# Patient Record
Sex: FEMALE | Race: WHITE | NOT HISPANIC OR LATINO | Employment: OTHER | URBAN - METROPOLITAN AREA
[De-identification: names, ages, dates, MRNs, and addresses within clinical notes are randomized per-mention and may not be internally consistent; named-entity substitution may affect disease eponyms.]

---

## 2018-08-28 ENCOUNTER — EVALUATION (OUTPATIENT)
Dept: PHYSICAL THERAPY | Facility: CLINIC | Age: 63
End: 2018-08-28
Payer: MEDICARE

## 2018-08-28 VITALS — SYSTOLIC BLOOD PRESSURE: 106 MMHG | DIASTOLIC BLOOD PRESSURE: 72 MMHG

## 2018-08-28 DIAGNOSIS — M23.300 MENISCUS, LATERAL, DERANGEMENT, RIGHT: ICD-10-CM

## 2018-08-28 DIAGNOSIS — M25.561 RIGHT KNEE PAIN, UNSPECIFIED CHRONICITY: Primary | ICD-10-CM

## 2018-08-28 PROCEDURE — 97140 MANUAL THERAPY 1/> REGIONS: CPT | Performed by: PHYSICAL THERAPIST

## 2018-08-28 PROCEDURE — 97161 PT EVAL LOW COMPLEX 20 MIN: CPT | Performed by: PHYSICAL THERAPIST

## 2018-08-28 PROCEDURE — G8979 MOBILITY GOAL STATUS: HCPCS | Performed by: PHYSICAL THERAPIST

## 2018-08-28 PROCEDURE — 97110 THERAPEUTIC EXERCISES: CPT | Performed by: PHYSICAL THERAPIST

## 2018-08-28 PROCEDURE — G8978 MOBILITY CURRENT STATUS: HCPCS | Performed by: PHYSICAL THERAPIST

## 2018-08-28 PROCEDURE — 97014 ELECTRIC STIMULATION THERAPY: CPT | Performed by: PHYSICAL THERAPIST

## 2018-08-28 RX ORDER — HYDROCODONE BITARTRATE AND ACETAMINOPHEN 5; 325 MG/1; MG/1
1 TABLET ORAL EVERY 6 HOURS PRN
COMMUNITY
End: 2019-09-13

## 2018-08-28 RX ORDER — MELOXICAM 15 MG/1
15 TABLET ORAL DAILY
COMMUNITY
End: 2019-09-13
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
## 2018-08-29 ENCOUNTER — TRANSCRIBE ORDERS (OUTPATIENT)
Dept: PHYSICAL THERAPY | Facility: CLINIC | Age: 63
End: 2018-08-29

## 2018-08-29 DIAGNOSIS — M25.561 RIGHT KNEE PAIN, UNSPECIFIED CHRONICITY: Primary | ICD-10-CM

## 2018-09-05 ENCOUNTER — OFFICE VISIT (OUTPATIENT)
Dept: PHYSICAL THERAPY | Facility: CLINIC | Age: 63
End: 2018-09-05
Payer: MEDICARE

## 2018-09-05 DIAGNOSIS — M25.561 RIGHT KNEE PAIN, UNSPECIFIED CHRONICITY: Primary | ICD-10-CM

## 2018-09-05 DIAGNOSIS — M23.300 MENISCUS, LATERAL, DERANGEMENT, RIGHT: ICD-10-CM

## 2018-09-05 PROCEDURE — 97014 ELECTRIC STIMULATION THERAPY: CPT | Performed by: PHYSICAL THERAPIST

## 2018-09-05 PROCEDURE — 97110 THERAPEUTIC EXERCISES: CPT | Performed by: PHYSICAL THERAPIST

## 2018-09-05 NOTE — PROGRESS NOTES
Daily Note     Today's date: 2018  Patient name: Luis Miguel Fernandez  : 1955  MRN: [id number]  Referring provider: Hans Newell MD  Dx:   Encounter Diagnosis     ICD-10-CM    1  Right knee pain, unspecified chronicity M25 561    2  Meniscus, lateral, derangement, right M23 300                   Subjective: Patient complains of right knee pain at 5/10 as well as sciatica pain to right thigh      Objective: See treatment diary below      Precautions:     Daily Treatment Diary     Manual             LE STRETCHES 10 10           PAT MOBS             LEG PULL                                           Exercise Diary             SLR 20x 20x           SAQ 1#/30 1/30           LAQ 1#/30 1/30           HEEL SLIDES 30x 30x           SLANT 4x 4x           BALL AD SQ             SITTING HIP FL             STANDING MARCH             BIKE             NUSTEP  4 min           CYBEX               ABD 20/30 20/30           ADD 25/30 25/30           LEG PRESS              CALF PRESS             LEG EXT             LEG CURL             TOTAL GYM             TREADMILL             ELLIPTICAL                 Modalities             E/STIM 10 10           HEAT/ICE 10 10                            Assessment: Tolerated treatment well  Patient exhibited good technique with therapeutic exercises and would benefit from continued PT, crepitus noted to right knee toay      Plan: Progress treatment as tolerated    Progress with aquatic therapy 1x/week as per MD

## 2018-09-07 ENCOUNTER — APPOINTMENT (OUTPATIENT)
Dept: PHYSICAL THERAPY | Facility: CLINIC | Age: 63
End: 2018-09-07
Payer: MEDICARE

## 2018-09-07 ENCOUNTER — OFFICE VISIT (OUTPATIENT)
Dept: PHYSICAL THERAPY | Facility: CLINIC | Age: 63
End: 2018-09-07
Payer: MEDICARE

## 2018-09-07 DIAGNOSIS — M23.300 MENISCUS, LATERAL, DERANGEMENT, RIGHT: ICD-10-CM

## 2018-09-07 DIAGNOSIS — M25.561 RIGHT KNEE PAIN, UNSPECIFIED CHRONICITY: Primary | ICD-10-CM

## 2018-09-07 PROCEDURE — 97113 AQUATIC THERAPY/EXERCISES: CPT

## 2018-09-07 NOTE — PROGRESS NOTES
Daily Note     Today's date: 2018  Patient name: Rhonda Arenas  : 1955  MRN: [id number]  Referring provider: Thi Lopez MD  Dx:   Encounter Diagnosis     ICD-10-CM    1  Right knee pain, unspecified chronicity M25 561    2  Meniscus, lateral, derangement, right M23 300        Start Time: 1600  Stop Time: 1645  Total time in clinic (min): 45 minutes    Subjective: pt complains of R knee pain -8/10, mostly post knee  Objective: See treatment diary below  /78       Precautions: R knee scop 18    Daily Treatment Diary       Exercise Diary              Water walking 10            Postural training 2            Gait training             Home exercise pgm/patient education 3 pt ed            Wall: t/h raises 1            Hip abd/add 2             1            squats             Knee flex/ext             Step-ups (fwd/bkwd/ss)             SLS (eyes open/closed)             SLS w UE mvmt  AROM/ball toss             Weight shifting             UE Noodle work x 4              UE AROM             Resistive UE work (paddles, bells, TB)             Core work on noodle (sitting/stdg)             Sit on noodle with movement             Seated on pool bench w proper posture             Ankle df/pf 1             1            Hip Ab/add 1            Knee flex/ext 1            Deep water mvmt 3 bike            Deep water tx/stretching 10            Specific self - stretches wall/steps 4                Modalities              whirlpool 5                Assessment: Tolerated treatment fair  Pt complains of R post knee pain with walking in the pool  Low level program to begin aquatic session  Instructed pt on posture and breathing during ex's  Worked in a pain free ROM with R knee today  Increase ex's and reps as tolerated  Pt Patient would benefit from continued PT      Plan: Progress treatment as tolerated  Cont PT as per plan of care

## 2018-09-11 ENCOUNTER — OFFICE VISIT (OUTPATIENT)
Dept: PHYSICAL THERAPY | Facility: CLINIC | Age: 63
End: 2018-09-11
Payer: MEDICARE

## 2018-09-11 DIAGNOSIS — M25.561 RIGHT KNEE PAIN, UNSPECIFIED CHRONICITY: Primary | ICD-10-CM

## 2018-09-11 DIAGNOSIS — M23.300 MENISCUS, LATERAL, DERANGEMENT, RIGHT: ICD-10-CM

## 2018-09-11 PROCEDURE — 97110 THERAPEUTIC EXERCISES: CPT | Performed by: PHYSICAL THERAPIST

## 2018-09-11 PROCEDURE — 97014 ELECTRIC STIMULATION THERAPY: CPT | Performed by: PHYSICAL THERAPIST

## 2018-09-11 NOTE — PROGRESS NOTES
Daily Note     Today's date: 2018  Patient name: Aidee Montreroso  : 1955  MRN: [id number]  Referring provider: Leslee Stallings MD  Dx:   Encounter Diagnosis     ICD-10-CM    1  Right knee pain, unspecified chronicity M25 561    2  Meniscus, lateral, derangement, right M23 300                   Subjective: Patient complains of right posterior and anterior knee pain 7/10 on steps      Objective: See treatment diary below        Precautions: R knee scop 18, HTN, OA    Daily Treatment Diary         Daily Treatment Diary     Manual            LE STRETCHES 10 10 10          PAT MOBS             LEG PULL                                           Exercise Diary            SLR 20x 20x 30          SAQ 1#/30           LAQ 1#/30           HEEL SLIDES 30x 30x 30x          SLANT 4x 4x 4x          BALL AD SQ             SITTING HIP FL             STANDING MARCH             BIKE             NUSTEP  4 min 5          CYBEX               ABD 20/30 20/30 30/30          ADD 25/30 25/30 30/30          LEG PRESS              CALF PRESS             LEG EXT             LEG CURL             TOTAL GYM             TREADMILL             ELLIPTICAL                 Modalities            E/STIM 10 10 10          HEAT/ICE 10 10 10                           Assessment: Tolerated treatment well  Patient exhibited good technique with therapeutic exercises and would benefit from continued PT      Plan: Progress treatment as tolerated

## 2018-09-12 ENCOUNTER — APPOINTMENT (OUTPATIENT)
Dept: PHYSICAL THERAPY | Facility: CLINIC | Age: 63
End: 2018-09-12
Payer: MEDICARE

## 2018-09-14 ENCOUNTER — APPOINTMENT (OUTPATIENT)
Dept: PHYSICAL THERAPY | Facility: CLINIC | Age: 63
End: 2018-09-14
Payer: MEDICARE

## 2018-09-14 ENCOUNTER — OFFICE VISIT (OUTPATIENT)
Dept: PHYSICAL THERAPY | Facility: CLINIC | Age: 63
End: 2018-09-14
Payer: MEDICARE

## 2018-09-14 DIAGNOSIS — M25.561 RIGHT KNEE PAIN, UNSPECIFIED CHRONICITY: Primary | ICD-10-CM

## 2018-09-14 DIAGNOSIS — M23.300 MENISCUS, LATERAL, DERANGEMENT, RIGHT: ICD-10-CM

## 2018-09-14 PROCEDURE — 97113 AQUATIC THERAPY/EXERCISES: CPT

## 2018-09-14 NOTE — PROGRESS NOTES
Daily Note     Today's date: 2018  Patient name: Elvira Alston  : 1955  MRN: [id number]  Referring provider: Bunny Ortiz MD  Dx:   Encounter Diagnosis     ICD-10-CM    1  Right knee pain, unspecified chronicity M25 561    2  Meniscus, lateral, derangement, right M23 300        Start Time: 1000  Stop Time: 1050  Total time in clinic (min): 50 minutes    Subjective: pt complains of R knee pain 8/10 with going up stairs or getting out of a chair  Just walking the knee feels ok  She notes she was sore after initial pool visit for 2 days all over  Pt did get an injection in her LB on Monday and now she notes back feels better but continues with R LE radicular symptoms  Objective: See treatment diary below  /78       Precautions: R knee scop 18    Daily Treatment Diary       Exercise Diary             Water walking 10 10           Postural training 2 2           Gait training             Home exercise pgm/patient education 3 pt ed 3           Wall: t/h raises 1 1           Hip abd/add 2 1            1 1           squats             Knee flex/ext  1           Step-ups (fwd/bkwd/ss)             SLS (eyes open/closed)             SLS w UE mvmt  AROM/ball toss             Weight shifting             UE Noodle work x 4              UE AROM             Resistive UE work (paddles, bells, TB)             Core work on noodle (sitting/stdg)             Sit on noodle with movement             Seated on pool bench w proper posture             Ankle df/pf 1 1            1           Hip Ab/add 1 1           Knee flex/ext 1 1           Deep water mvmt 3 bike NT           Deep water tx/stretching 10 10           Specific self - stretches wall/steps 4 4               Modalities             whirlpool 5 10               Assessment: Tolerated treatment fair  Kept program light due to increased soreness after last visit  increased hang time in deep water today   All ex's done in a pain free ROM for R knee  Focused on light and steady movements with good posture and breathing  Pt Patient would benefit from continued PT      Plan: Progress treatment as tolerated  Cont PT as per plan of care

## 2018-09-18 ENCOUNTER — APPOINTMENT (OUTPATIENT)
Dept: PHYSICAL THERAPY | Facility: CLINIC | Age: 63
End: 2018-09-18
Payer: MEDICARE

## 2018-09-19 ENCOUNTER — APPOINTMENT (OUTPATIENT)
Dept: PHYSICAL THERAPY | Facility: CLINIC | Age: 63
End: 2018-09-19
Payer: MEDICARE

## 2018-09-21 ENCOUNTER — OFFICE VISIT (OUTPATIENT)
Dept: PHYSICAL THERAPY | Facility: CLINIC | Age: 63
End: 2018-09-21
Payer: MEDICARE

## 2018-09-21 DIAGNOSIS — M23.300 MENISCUS, LATERAL, DERANGEMENT, RIGHT: ICD-10-CM

## 2018-09-21 DIAGNOSIS — M25.561 RIGHT KNEE PAIN, UNSPECIFIED CHRONICITY: Primary | ICD-10-CM

## 2018-09-21 PROCEDURE — G8979 MOBILITY GOAL STATUS: HCPCS

## 2018-09-21 PROCEDURE — 97113 AQUATIC THERAPY/EXERCISES: CPT

## 2018-09-21 PROCEDURE — G8978 MOBILITY CURRENT STATUS: HCPCS

## 2018-09-21 NOTE — PROGRESS NOTES
Daily Note     Today's date: 2018  Patient name: Ashleigh Balderrama  : 1955  MRN: [id number]  Referring provider: Diana Christiansen MD  Dx:   Encounter Diagnosis     ICD-10-CM    1  Right knee pain, unspecified chronicity M25 561    2  Meniscus, lateral, derangement, right M23 300        Start Time: 1015  Stop Time: 1105  Total time in clinic (min): 50 minutes    Subjective: pt complains of R knee pain 8/10 with post knee pain and radicular symptoms down R LE  She states she did better after last pool visit  Objective: See treatment diary below        Precautions: R knee scop 18    Daily Treatment Diary       Exercise Diary            Water walking 10 10 10          Postural training 2 2 2          Gait training             Home exercise pgm/patient education 3 pt ed 3           Wall: t/h raises 1 1 1          Hip abd/add 2 1 2          Marching 1 1 1          squats             Knee flex/ext  1 1          Step-ups (fwd/bkwd/ss)             SLS (eyes open/closed)             SLS w UE mvmt  AROM/ball toss             Weight shifting             UE Noodle work x 4              UE AROM             Resistive UE work (paddles, bells, TB)             Core work on noodle (sitting/stdg)             Sit on noodle with movement             Seated on pool bench w proper posture             Ankle df/pf 1 1 1          marching 1 1 1          Hip Ab/add 1 1 1          Knee flex/ext 1 1 1          Deep water mvmt 3 bike NT 2          Deep water tx/stretching 10 10 10          Specific self - stretches wall/steps 4 4 4              Modalities            whirlpool 5 10 10              Assessment: Tolerated treatment fair  Added bike back in today for 2 minutes to get some movement in B LE's  Pt moving better while in the pool  No pain in R knee while performing ex's today    Pt goes into a prone swim when changing ex's with noodle support under her arms , only lasting a few seconds pt says it feels good to move  Patient would benefit from continued PT      Plan: Progress treatment as tolerated  Cont PT as per plan of care

## 2018-09-25 ENCOUNTER — OFFICE VISIT (OUTPATIENT)
Dept: PHYSICAL THERAPY | Facility: CLINIC | Age: 63
End: 2018-09-25
Payer: MEDICARE

## 2018-09-25 DIAGNOSIS — M25.561 RIGHT KNEE PAIN, UNSPECIFIED CHRONICITY: Primary | ICD-10-CM

## 2018-09-25 PROCEDURE — 97110 THERAPEUTIC EXERCISES: CPT | Performed by: PHYSICAL THERAPIST

## 2018-09-25 PROCEDURE — 97014 ELECTRIC STIMULATION THERAPY: CPT | Performed by: PHYSICAL THERAPIST

## 2018-09-25 PROCEDURE — 97140 MANUAL THERAPY 1/> REGIONS: CPT | Performed by: PHYSICAL THERAPIST

## 2018-09-25 NOTE — PROGRESS NOTES
Daily Note     Today's date: 2018  Patient name: Aidee Monterroso  : 1955  MRN: [id number]  Referring provider: Leslee Stallings MD  Dx:   Encounter Diagnosis     ICD-10-CM    1  Right knee pain, unspecified chronicity M25 561                   Subjective: Patient continues to complain of pain and swelling to right knee -7/10      Objective: See treatment diary below          Precautions: R knee scop 18, HTN, OA    Daily Treatment Diary         Daily Treatment Diary     Manual           LE STRETCHES 10 10 10 10         PAT MOBS             LEG PULL                                           Exercise Diary           SLR 20x 20x 30 30x         SAQ 1#/30          LAQ 1#/30          HEEL SLIDES 30x 30x 30x 30x         SLANT 4x 4x 4x 4x         BALL AD SQ             SITTING HIP FL             STANDING MARCH             BIKE             NUSTEP  4 min 5 5 min         CYBEX               ABD 20/30 20/30 30/30 30/30         ADD 25/30 25/30 30/30 30/30         LEG PRESS              CALF PRESS             LEG EXT             LEG CURL             TOTAL GYM             TREADMILL             ELLIPTICAL                 Modalities           E/STIM 10 10 10 10         HEAT/ICE 10 10 10 10                          Assessment: Tolerated treatment well  Patient exhibited good technique with therapeutic exercises and would benefit from continued PT, still with decreased quad strength and knee pain and swelling      Plan: Progress treatment as tolerated

## 2018-09-26 ENCOUNTER — APPOINTMENT (OUTPATIENT)
Dept: PHYSICAL THERAPY | Facility: CLINIC | Age: 63
End: 2018-09-26
Payer: MEDICARE

## 2018-09-28 ENCOUNTER — OFFICE VISIT (OUTPATIENT)
Dept: PHYSICAL THERAPY | Facility: CLINIC | Age: 63
End: 2018-09-28
Payer: MEDICARE

## 2018-09-28 DIAGNOSIS — M25.561 RIGHT KNEE PAIN, UNSPECIFIED CHRONICITY: Primary | ICD-10-CM

## 2018-09-28 DIAGNOSIS — M23.300 MENISCUS, LATERAL, DERANGEMENT, RIGHT: ICD-10-CM

## 2018-09-28 PROCEDURE — 97113 AQUATIC THERAPY/EXERCISES: CPT

## 2018-09-28 NOTE — PROGRESS NOTES
Daily Note     Today's date: 2018  Patient name: Tonia Guerra  : 1955  MRN: [id number]  Referring provider: Sarah Valencia MD  Dx:   Encounter Diagnosis     ICD-10-CM    1  Right knee pain, unspecified chronicity M25 561    2  Meniscus, lateral, derangement, right M23 300        Start Time: 1025  Stop Time: 1110  Total time in clinic (min): 45 minutes    Subjective: pt complains of R knee pain 7/10  She notes it feels good after the pool sessions  She states she  Has to take steps one at a time at home because of knee pain  She notes she is sleeping better at night now, the knee pian isn't waking her up  Objective: See treatment diary below        Precautions: R knee scop 18    Daily Treatment Diary       Exercise Diary           Water walking 10 10 10 10         Postural training 2 2 2          Gait training             Home exercise pgm/patient education 3 pt ed 3           Wall: t/h raises 1 1 1 1         Hip abd/add 2 1 2 2         Marching 1 1 1 1         squats             Knee flex/ext  1 1 1         Step-ups (fwd/bkwd/ss)             SLS (eyes open/closed)             SLS w UE mvmt  AROM/ball toss             Weight shifting             UE Noodle work x 4              UE AROM             Resistive UE work (paddles, bells, TB)             Core work on noodle (sitting/stdg)             Sit on noodle with movement             Seated on pool bench w proper posture             Ankle df/pf 1 1 1 1         marching 1 1 1 1         Hip Ab/add 1 1 1 1         Knee flex/ext 1 1 1 1         Deep water mvmt 3 bike NT 2 2 bike         Deep water tx/stretching 10 10 10 10         Specific self - stretches wall/steps 4 4 4 4             Modalities           whirlpool 5 10 10 10             Assessment: Tolerated treatment fair  No complaints of increased pain with the ex's in the pool, but did not add any new ex's  Due to high pain levels   Patient would benefit from continued PT      Plan: Progress treatment as tolerated  Cont PT as per plan of care

## 2018-10-02 ENCOUNTER — OFFICE VISIT (OUTPATIENT)
Dept: PHYSICAL THERAPY | Facility: CLINIC | Age: 63
End: 2018-10-02
Payer: COMMERCIAL

## 2018-10-02 DIAGNOSIS — M25.561 RIGHT KNEE PAIN, UNSPECIFIED CHRONICITY: Primary | ICD-10-CM

## 2018-10-02 DIAGNOSIS — M23.300 MENISCUS, LATERAL, DERANGEMENT, RIGHT: ICD-10-CM

## 2018-10-02 PROCEDURE — 97140 MANUAL THERAPY 1/> REGIONS: CPT | Performed by: PHYSICAL THERAPIST

## 2018-10-02 PROCEDURE — 97110 THERAPEUTIC EXERCISES: CPT | Performed by: PHYSICAL THERAPIST

## 2018-10-02 PROCEDURE — 97014 ELECTRIC STIMULATION THERAPY: CPT | Performed by: PHYSICAL THERAPIST

## 2018-10-02 NOTE — PROGRESS NOTES
Daily Note     Today's date: 10/2/2018  Patient name: Michi Duque  : 1955  MRN: [id number]  Referring provider: Alicia Diehl MD  Dx:   Encounter Diagnosis     ICD-10-CM    1  Right knee pain, unspecified chronicity M25 561    2  Meniscus, lateral, derangement, right M23 300        Start Time: 1430  Stop Time: 1520  Total time in clinic (min): 50 minutes    Subjective: Pt saw Dr Sherry Rizzo, "fluid on the knee"  Pt will not be able to get a cortisone injection in right knee until she is finished with pain management per Dr Sherry Rizzo  Objective: See treatment diary below      Assessment: Increased pain with knee flexion  Crepitus with patellar mobs  Tolerated treatment fair  Patient would benefit from continued PT  Plan: Continue per plan of care       Precautions: R knee scop 18    Daily Treatment Diary       Manual   10/2        LE STRETCHES 10 10 10 10 10        PAT MOBS             LEG PULL                                           Exercise Diary   10/2        SLR 20x 20x 30 30x 30x        SAQ 1#/30 1/30 1/30 2/30 2#/ 30        LAQ 1#/30 1/30 1/30 2/30 2#/ 30        HEEL SLIDES 30x 30x 30x 30x 30x        SLANT 4x 4x 4x 4x 30"x4        BALL AD SQ             SITTING HIP FL             STANDING MARCH             BIKE             NUSTEP  4 min 5 5 min 5'        CYBEX               ABD 20/30 20/30 30/30 30/30 30#/ 30        ADD 25/30 25/30 30/30 30/30 30#/ 30        LEG PRESS              CALF PRESS             LEG EXT             LEG CURL             TOTAL GYM             TREADMILL             ELLIPTICAL                 Modalities   10/2        E/STIM 10 10 10 10 10        HEAT/ICE 10 10 10 10 10

## 2018-10-05 ENCOUNTER — OFFICE VISIT (OUTPATIENT)
Dept: PHYSICAL THERAPY | Facility: CLINIC | Age: 63
End: 2018-10-05
Payer: COMMERCIAL

## 2018-10-05 DIAGNOSIS — M25.561 RIGHT KNEE PAIN, UNSPECIFIED CHRONICITY: Primary | ICD-10-CM

## 2018-10-05 DIAGNOSIS — M23.300 MENISCUS, LATERAL, DERANGEMENT, RIGHT: ICD-10-CM

## 2018-10-05 PROCEDURE — G8979 MOBILITY GOAL STATUS: HCPCS

## 2018-10-05 PROCEDURE — 97113 AQUATIC THERAPY/EXERCISES: CPT

## 2018-10-05 PROCEDURE — 97164 PT RE-EVAL EST PLAN CARE: CPT | Performed by: PHYSICAL THERAPIST

## 2018-10-05 PROCEDURE — G8978 MOBILITY CURRENT STATUS: HCPCS

## 2018-10-05 NOTE — PROGRESS NOTES
Daily Note     Today's date: 10/5/2018  Patient name: Vasyl Rodriguez  : 1955  MRN: [id number]  Referring provider: Juancarlos Guillen MD  Dx:   Encounter Diagnosis     ICD-10-CM    1  Right knee pain, unspecified chronicity M25 561    2  Meniscus, lateral, derangement, right M23 300        Start Time: 1025  Stop Time: 1150  Total time in clinic (min): 85 minutes    Subjective: pt complains of increase pain since yesterday  Pt states she saw the MD on Wednesday and told her to do more SLR's at home  Pt then notes she went home and did her HEP but increased her reps to 30 for all and now complains of sig pain and swelling in R knee  Pt complains of pain -9/10 today  Objective: See treatment diary below  R knee AROM -7 to 100 degrees, very painful today,  R Hs strength 4/5 Quad 3+/5 very painful, R knee girth at joint 19 inches was 19 5  Difficulty with walking today, antalgic gait due to increased pain            Precautions: R knee scop 18    Daily Treatment Diary       Exercise Diary   10/5        Water walking 10 10 10 10 10        Postural training 2 2 2          Gait training             Home exercise pgm/patient education 3 pt ed 3           Wall: t/h raises 1 1 1 1 1        Hip abd/add 2 1 2 2 2        Marching 1 1 1 1 1        squats             Knee flex/ext  1 1 1 1        Step-ups (fwd/bkwd/ss)             SLS (eyes open/closed)             SLS w UE mvmt  AROM/ball toss             Weight shifting             UE Noodle work x 4              UE AROM             Resistive UE work (paddles, bells, TB)             Core work on noodle (sitting/stdg)             Sit on noodle with movement             Seated on pool bench w proper posture             Ankle df/pf 1 1 1 1 1        marching 1 1 1 1 1        Hip Ab/add 1 1 1 1 1        Knee flex/ext 1 1 1 1 1        Deep water mvmt 3 bike NT 2 2 bike 2        Deep water tx/stretching 10 10 10 10 10        Specific self - stretches wall/steps 4 4 4 4 4            Modalities  9/7 9/14 9/21 9/28 10/5        whirlpool 5 10 10 10 10              Assessment: Tolerated treatment fair  Pt was stiff and guarded with ex's in the pool today  Modified ex's as per pt tolerated, seated HS stretch today instead of standing  Poor tolerance for stairs and walking due to high pain levels  Decreased ROM noted today due to high pain levels and increased swelling  Patient would benefit from continued PT      Plan: Continue per plan of care

## 2018-10-09 ENCOUNTER — APPOINTMENT (OUTPATIENT)
Dept: PHYSICAL THERAPY | Facility: CLINIC | Age: 63
End: 2018-10-09
Payer: COMMERCIAL

## 2018-10-09 NOTE — PROGRESS NOTES
Daily Note     Today's date: 10/9/2018  Patient name: Jennie Albarado  : 1955  MRN: [id number]  Referring provider: Mary Medina MD  Dx:   Encounter Diagnosis     ICD-10-CM    1  Right knee pain, unspecified chronicity M25 561    2  Meniscus, lateral, derangement, right M23 300                   Subjective: ***      Objective: See treatment diary below  Precautions: R knee scop 18    Daily Treatment Diary   Modalities   10/2        E/STIM 10 10 10 10 10        HEAT/ICE 10 10 10 10 10                         Manual   10/2        LE STRETCHES 10 10 10 10 10                                                                Exercise Diary   10/2        SLR 20x 20x 30 30x 30x        SAQ 1#/30 1/30 1/30 2/30 2#/ 30        LAQ 1#/30 1/30 1/30 2/30 2#/ 30        HEEL SLIDES 30x 30x 30x 30x 30x                     SLANT 4x 4x 4x 4x 30"x4                                               BIKE             NUSTEP  4 min 5 5 min 5'        CYBEX               Hip ABD 20/30 20/30 30/30 30/30 30#/ 30        Hip ADD 25/30 25/30 30/30 30/30 30#/ 30                                                                                                                 Assessment: Tolerated treatment {Tolerated treatment :3862104827}   Patient {assessment:1424699672}      Plan: {PLAN:3481554541}

## 2018-10-12 ENCOUNTER — OFFICE VISIT (OUTPATIENT)
Dept: PHYSICAL THERAPY | Facility: CLINIC | Age: 63
End: 2018-10-12
Payer: COMMERCIAL

## 2018-10-12 DIAGNOSIS — M25.561 RIGHT KNEE PAIN, UNSPECIFIED CHRONICITY: Primary | ICD-10-CM

## 2018-10-12 DIAGNOSIS — M23.300 MENISCUS, LATERAL, DERANGEMENT, RIGHT: ICD-10-CM

## 2018-10-12 PROCEDURE — 97113 AQUATIC THERAPY/EXERCISES: CPT

## 2018-10-12 NOTE — PROGRESS NOTES
Daily Note     Today's date: 10/12/2018  Patient name: Mary Curtis  : 1955  MRN: [id number]  Referring provider: Ethel Rodriguez MD  Dx:   Encounter Diagnosis     ICD-10-CM    1  Right knee pain, unspecified chronicity M25 561    2  Meniscus, lateral, derangement, right M23 300        Start Time: 1130  Stop Time: 1200  Total time in clinic (min): 30 minutes    Subjective: Pt notes she had a shot in her R hip on Tuesday  She states she had a lot of pain on Wednesday but it's starting to feel better now  R knee pain today is /10  Pt apologized for being late for appt, she had the times mixed up  Objective: See treatment diary below  Precautions: R knee scop 18    Daily Treatment Diary       Exercise Diary  9/7 9/14 9/21 9/28 10/5 10/12       Water walking 10 10 10 10 10 5       Postural training 2 2 2          Gait training             Home exercise pgm/patient education 3 pt ed 3           Wall: t/h raises 1 1 1 1 1 1       Hip abd/add 2 1 2 2 2 2       Marching 1 1 1 1 1 1       squats             Knee flex/ext  1 1 1 1 1       Step-ups (fwd/bkwd/ss)             SLS (eyes open/closed)             SLS w UE mvmt  AROM/ball toss             Weight shifting             UE Noodle work x 4              UE AROM             Resistive UE work (paddles, bells, TB)             Core work on noodle (sitting/stdg)             Sit on noodle with movement             Seated on pool bench w proper posture      1       Ankle df/pf 1 1 1 1 1 1        1 1 1 1 1 1       Hip Ab/add 1 1 1 1 1 1       Knee flex/ext 1 1 1 1 1 1       Deep water mvmt 3 bike NT 2 2 bike 2 NT       Deep water tx/stretching 10 10 10 10 10 5       Specific self - stretches wall/steps 4 4 4 4 4 2           Modalities  9/7 9/14 9/21 9/28 10/5 10       whirlpool 5 10 10 10 10 8           Assessment: Tolerated treatment fair  Pt is moving better today than last visit  Decreased pain overall in R LE now   Modified treatment today due to time restraints  Will add ex's as pain comes down in R knee  Pt was able to do steps out of the pool step over step  Patient would benefit from continued PT      Plan: Continue per plan of care

## 2018-10-16 ENCOUNTER — OFFICE VISIT (OUTPATIENT)
Dept: PHYSICAL THERAPY | Facility: CLINIC | Age: 63
End: 2018-10-16
Payer: COMMERCIAL

## 2018-10-16 DIAGNOSIS — M23.300 MENISCUS, LATERAL, DERANGEMENT, RIGHT: ICD-10-CM

## 2018-10-16 DIAGNOSIS — M25.561 RIGHT KNEE PAIN, UNSPECIFIED CHRONICITY: Primary | ICD-10-CM

## 2018-10-16 PROCEDURE — 97110 THERAPEUTIC EXERCISES: CPT | Performed by: PHYSICAL THERAPIST

## 2018-10-16 PROCEDURE — 97140 MANUAL THERAPY 1/> REGIONS: CPT | Performed by: PHYSICAL THERAPIST

## 2018-10-16 PROCEDURE — 97014 ELECTRIC STIMULATION THERAPY: CPT | Performed by: PHYSICAL THERAPIST

## 2018-10-16 NOTE — PROGRESS NOTES
Daily Note     Today's date: 10/16/2018  Patient name: Kellee Mcneil  : 1955  MRN: [id number]  Referring provider: Kwesi Granados MD  Dx:   Encounter Diagnosis     ICD-10-CM    1  Right knee pain, unspecified chronicity M25 561    2  Meniscus, lateral, derangement, right M23 300        Start Time: 1000  Stop Time: 1050  Total time in clinic (min): 50 minutes    Subjective: Patient reports her right sciatica is really bothering her  Patient reports since she got a shot in her right knee that is feeling better  Objective: See treatment diary below    Precautions: R knee scop 18    Daily Treatment Diary   Modalities  8/28 9/5 9/11 9/25 10/2 10/16       E/STIM 10 10 10 10 10 10'       HEAT/ICE 10 10 10 10 10 10'                        Manual  8/28 9/5 9/11 9/25 10/2 10/16       LE STRETCHES 10 10 10 10 10 10'                                                               Exercise Diary  8/28 9/5 9/11 9/25 10/2 10/16       SLR 20x 20x 30 30x 30x 30x       SAQ 1#/30 1/30 1/30 2/30 2#/ 30 2# 30x       LAQ 1#/30 1/30 1/30 2/30 2#/ 30 2# 30x       HEEL SLIDES 30x 30x 30x 30x 30x 30x                    SLANT 4x 4x 4x 4x 30"x4 30" 4x                                              BIKE             NUSTEP  4 min 5 5 min 5' 6'       CYBEX               Hip ABD 20/30 20/30 30/30 30/30 30#/ 30 30# 30x       Hip ADD 25/30 25/30 30/30 30/30 30#/ 30 30# 30x                                                                                                                  Assessment: Tolerated treatment fair  Patient has no pain with right knee extension today, c/o sciatica pain in right posterior LE  Patient would benefit from continued PT      Plan: Continue per plan of care

## 2018-10-19 ENCOUNTER — OFFICE VISIT (OUTPATIENT)
Dept: PHYSICAL THERAPY | Facility: CLINIC | Age: 63
End: 2018-10-19
Payer: COMMERCIAL

## 2018-10-19 DIAGNOSIS — M25.561 RIGHT KNEE PAIN, UNSPECIFIED CHRONICITY: Primary | ICD-10-CM

## 2018-10-19 DIAGNOSIS — M23.300 MENISCUS, LATERAL, DERANGEMENT, RIGHT: ICD-10-CM

## 2018-10-19 PROCEDURE — 97113 AQUATIC THERAPY/EXERCISES: CPT

## 2018-10-19 NOTE — PROGRESS NOTES
Daily Note     Today's date: 10/19/2018  Patient name: Shannan Irving  : 1955  MRN: [id number]  Referring provider: Rakesh Shaver MD  Dx:   Encounter Diagnosis     ICD-10-CM    1  Right knee pain, unspecified chronicity M25 561    2  Meniscus, lateral, derangement, right M23 300        Start Time: 1010  Stop Time: 1110  Total time in clinic (min): 60 minutes    Subjective: Pt notes high pain levels today In her LB and R LE radicular symptoms  Pt states she felt increased pain after last PT session on land  Pt thinks the hamstring stretching was too much  Pt complains of numbness in R foot that doesn't go away  Pt seeing a chiropractor 3x week also  Objective: See treatment diary below  Precautions: R knee scop 18    Daily Treatment Diary       Exercise Diary  9/7 9/14 9/21 9/28 10/5 10/12 10/17      Water walking 10 10 10 10 10 5 5      Postural training 2 2 2    5      Gait training             Home exercise pgm/patient education 3 pt ed 3           Wall: t/h raises 1 1 1 1 1 1 2 DW      Hip abd/add 2 1 2 2 2 2 2DW      Marching 1 1 1 1 1 1 2DW      squats             Knee flex/ext  1 1 1 1 1 2DW      Step-ups (fwd/bkwd/ss)             SLS (eyes open/closed)             SLS w UE mvmt  AROM/ball toss             Weight shifting             UE Noodle work x 4              UE AROM             Resistive UE work (paddles, bells, TB)             Core work on noodle (sitting/stdg)             Sit on noodle with movement             Seated on pool bench w proper posture      1 1      Ankle df/pf 1 1 1 1 1 1 1      marching 1 1 1 1 1 1 1      Hip Ab/add 1 1 1 1 1 1 1      Knee flex/ext 1 1 1 1 1 1 1      Deep water mvmt 3 bike NT 2 2 bike 2 NT 5      Deep water tx/stretching 10 10 10 10 10 5 15      Specific self - stretches wall/steps 4 4 4 4 4 2 NT          Modalities  9/7 9/14 9/21 9/28 10/5 10/12 10/17      whirlpool 5 10 10 10 10 8 10          Assessment: Tolerated treatment poorly today   Pt has sig LBP and radicular pain today pt was unable to complete most of her program as before  Modified program today due to LBP, pt performed all standing ex's in deep water with intervals of hanging and stretching and then ex's  Pt was able to do seated ex's but HS stretching today  No change in numbness in R foot after session  Patient would benefit from continued PT      Plan: Continue per plan of care

## 2018-10-23 ENCOUNTER — OFFICE VISIT (OUTPATIENT)
Dept: PHYSICAL THERAPY | Facility: CLINIC | Age: 63
End: 2018-10-23
Payer: COMMERCIAL

## 2018-10-23 DIAGNOSIS — M23.300 MENISCUS, LATERAL, DERANGEMENT, RIGHT: ICD-10-CM

## 2018-10-23 DIAGNOSIS — M25.561 RIGHT KNEE PAIN, UNSPECIFIED CHRONICITY: Primary | ICD-10-CM

## 2018-10-23 PROCEDURE — 97014 ELECTRIC STIMULATION THERAPY: CPT

## 2018-10-23 PROCEDURE — 97110 THERAPEUTIC EXERCISES: CPT

## 2018-10-23 PROCEDURE — 97140 MANUAL THERAPY 1/> REGIONS: CPT

## 2018-10-23 NOTE — PROGRESS NOTES
Daily Note     Today's date: 10/23/2018  Patient name: Mary Curtis  : 1955  MRN: [id number]  Referring provider: Ethel Rodriguez MD  Dx:   Encounter Diagnosis     ICD-10-CM    1  Right knee pain, unspecified chronicity M25 561    2  Meniscus, lateral, derangement, right M23 300        Start Time: 1005  Stop Time: 1055  Total time in clinic (min): 50 minutes    Subjective: patient continues to complain of increased pain LB and radiates down right LE,       Objective: See treatment diary below      Assessment: Tolerated treatment fair, guarded with Right LE gentle  stretches,  patient deferred gym TE due increased lbp today, complains of right foot and outer thigh numbness  Patient demonstrated fatigue post treatment and would benefit from continued PT      Plan: Continue per plan of care  Progress treatment as tolerated          Precautions: R knee scop 18    Daily Treatment Diary   Modalities  8/28 9/5 9/11 9/25 10/2 10/16 10/23      E/STIM 10 10 10 10 10 10' 10'      HEAT/ICE 10 10 10 10 10 10' 10'                       Manual  8/28 9/5 9/11 9/25 10/2 10/16 10/23      LE STRETCHES 10 10 10 10 10 10' 10'                                                              Exercise Diary  8/28 9/5 9/11 9/25 10/2 10/16 10/23      SLR 20x 20x 30 30x 30x 30x 30x      SAQ 1#/30 1/30 1/30 2/30 2#/ 30 2# 30x 1#/30      LAQ 1#/30 1/30 1/30 2/30 2#/ 30 2# 30x 1#/30      HEEL SLIDES 30x 30x 30x 30x 30x 30x 30x                   SLANT 4x 4x 4x 4x 30"x4 30" 4x nt                                              BIKE             NUSTEP  4 min 5 5 min 5' 6' nt      CYBEX               Hip ABD 20/30 20/30 30/30 30/30 30#/ 30 30# 30x nt      Hip ADD 25/30 25/30 30/30 30/30 30#/ 30 30# 30x nt

## 2018-10-26 ENCOUNTER — APPOINTMENT (OUTPATIENT)
Dept: PHYSICAL THERAPY | Facility: CLINIC | Age: 63
End: 2018-10-26
Payer: COMMERCIAL

## 2018-10-30 ENCOUNTER — APPOINTMENT (OUTPATIENT)
Dept: PHYSICAL THERAPY | Facility: CLINIC | Age: 63
End: 2018-10-30
Payer: COMMERCIAL

## 2018-10-30 NOTE — PROGRESS NOTES
Daily Note     Today's date: 10/30/2018  Patient name: Mónica Radford  : 1955  MRN: [id number]  Referring provider: Lance Cox MD  Dx:   Encounter Diagnosis     ICD-10-CM    1  Right knee pain, unspecified chronicity M25 561    2  Meniscus, lateral, derangement, right M23 300                   Subjective: ***      Objective: See treatment diary below      Precautions: R knee scop 18    Daily Treatment Diary   Modalities  8/28 9/5 9/11 9/25 10/2 10/16 10/23      E/STIM 10 10 10 10 10 10' 10'      HEAT/ICE 10 10 10 10 10 10' 10'                       Manual  8/28 9/5 9/11 9/25 10/2 10/16 10/23      LE STRETCHES 10 10 10 10 10 10' 10'                                                              Exercise Diary  8/28 9/5 9/11 9/25 10/2 10/16 10/23      SLR 20x 20x 30 30x 30x 30x 30x      SAQ 1#/30 1/30 1/30 2/30 2#/ 30 2# 30x 1#/30      LAQ 1#/30 1/30 1/30 2/30 2#/ 30 2# 30x 1#/30      HEEL SLIDES 30x 30x 30x 30x 30x 30x 30x                   SLANT 4x 4x 4x 4x 30"x4 30" 4x nt                                              BIKE             NUSTEP  4 min 5 5 min 5' 6' nt      CYBEX               Hip ABD 20/30 20/30 30/30 30/30 30#/ 30 30# 30x nt      Hip ADD 25/30 25/30 30/30 30/30 30#/ 30 30# 30x nt                                                                                                     Assessment: Tolerated treatment {Tolerated treatment :1796171797}   Patient {assessment:[id number]}      Plan: {PLAN:6307615307}

## 2018-11-26 NOTE — PROGRESS NOTES
Spoke with patient on 11/27/18 on her status with therapy, patient self dc therapy due to having back sx this month and will call when she is cleared to return

## 2019-03-08 DIAGNOSIS — K21.9 GASTROESOPHAGEAL REFLUX DISEASE WITHOUT ESOPHAGITIS: Primary | ICD-10-CM

## 2019-03-08 RX ORDER — OMEPRAZOLE 20 MG/1
CAPSULE, DELAYED RELEASE ORAL
COMMUNITY
Start: 2011-07-19 | End: 2019-03-08 | Stop reason: SDUPTHER

## 2019-03-08 RX ORDER — OMEPRAZOLE 20 MG/1
20 CAPSULE, DELAYED RELEASE ORAL 2 TIMES DAILY
Qty: 180 CAPSULE | Refills: 3 | Status: SHIPPED | OUTPATIENT
Start: 2019-03-08 | End: 2020-03-12 | Stop reason: SDUPTHER

## 2019-09-03 ENCOUNTER — OFFICE VISIT (OUTPATIENT)
Dept: GASTROENTEROLOGY | Facility: CLINIC | Age: 64
End: 2019-09-03
Payer: MEDICARE

## 2019-09-03 VITALS
HEIGHT: 68 IN | DIASTOLIC BLOOD PRESSURE: 88 MMHG | BODY MASS INDEX: 33.95 KG/M2 | SYSTOLIC BLOOD PRESSURE: 130 MMHG | WEIGHT: 224 LBS

## 2019-09-03 DIAGNOSIS — K59.1 FUNCTIONAL DIARRHEA: Primary | ICD-10-CM

## 2019-09-03 PROCEDURE — 99213 OFFICE O/P EST LOW 20 MIN: CPT | Performed by: PHYSICIAN ASSISTANT

## 2019-09-03 RX ORDER — CYANOCOBALAMIN 1000 UG/ML
INJECTION INTRAMUSCULAR; SUBCUTANEOUS
Refills: 0 | COMMUNITY
Start: 2019-06-17 | End: 2021-02-23

## 2019-09-03 RX ORDER — DICYCLOMINE HCL 20 MG
20 TABLET ORAL EVERY 6 HOURS
Qty: 60 TABLET | Refills: 2 | Status: SHIPPED | OUTPATIENT
Start: 2019-09-03 | End: 2019-09-13

## 2019-09-03 RX ORDER — MAGNESIUM OXIDE/MAG AA CHELATE 300 MG
1 CAPSULE ORAL DAILY
COMMUNITY
Start: 2010-03-03

## 2019-09-03 RX ORDER — CHOLESTYRAMINE 4 G/9G
1 POWDER, FOR SUSPENSION ORAL
Qty: 60 PACKET | Refills: 1 | Status: SHIPPED | OUTPATIENT
Start: 2019-09-03 | End: 2021-02-23

## 2019-09-03 NOTE — PROGRESS NOTES
[facility] - Outpatient Follow-up Note  Una Soliman 58 y o  female MRN: [id number]  Encounter: [id number]          ASSESSMENT AND PLAN:      1  Functional diarrhea  IBS-D with incontinence  Workup in the past including several colonoscopies have been negative  Will Plan colonoscopy with biopsy for microscopic colitis  Trial of cholestyramine  She has a history of a Nakita-En-Y  Consider trial of Xifaxan  Consider trial of Viberzi  Lotronex was too expensive      ______________________________________________________________________    SUBJECTIVE:  76-year-old female presents for evaluation of diarrhea  This has been ongoing for many years  She reports that throughout the week she will have which she feels is constipation when she finally goes she will be incontinent of stool  She denies that is watery she describes as having the consistency of peanut butter  She admits to associated cramping and sharp abdominal pain  She has had extensive workup for the symptoms in the past   She has had multiple colonoscopies all of which were reported as normal however biopsies have never been taken of the colon mucosa  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  She had a hydrogen breath testing for small intestine bacterial overgrowth which was documented as negative  Xifaxan was prescribed however it is unclear if patient has ever taken this  Most recently in September of 2018 the patient was prescribed Lotronex however she states that it was too expensive and so she could not fill it  She takes OTC Imodium p r n  Veronica Carbone REVIEW OF SYSTEMS IS OTHERWISE NEGATIVE        Historical Information   Past Medical History:   Diagnosis Date    Arthritis     Hypertension     Osteoporosis      Past Surgical History:   Procedure Laterality Date    KNEE SURGERY       Social History   Social History     Substance and Sexual Activity   Alcohol Use Not Currently     Social History Substance and Sexual Activity   Drug Use Not Currently     Social History     Tobacco Use   Smoking Status Former Smoker   Smokeless Tobacco Never Used     Family History   Problem Relation Age of Onset    Diabetes Mother     Cancer Mother         lung cacer    Kidney disease Father     Heart disease Father     Cancer Father         bladder cancer       Meds/Allergies       Current Outpatient Medications:     Atomoxetine HCl (STRATTERA PO)    busPIRone (BUSPAR) 10 mg tablet    Cholecalciferol 00697 units capsule    clonazePAM (KlonoPIN) 0 5 mg tablet    DULoxetine (CYMBALTA) 60 mg delayed release capsule    DULoxetine (CYMBALTA) 60 mg delayed release capsule    folic acid (FOLVITE) 1 mg tablet    gabapentin (NEURONTIN) 300 mg capsule    gabapentin (NEURONTIN) 600 MG tablet    hydrochlorothiazide (HYDRODIURIL) 25 mg tablet    ketoconazole (NIZORAL) 2 % shampoo    lamoTRIgine (LaMICtal) 100 mg tablet    losartan (COZAAR) 100 MG tablet    Magnesium 300 MG CAPS    modafinil (PROVIGIL) 100 mg tablet    mometasone (ELOCON) 0 1 % lotion    naproxen (NAPROSYN) 500 mg tablet    omeprazole (PriLOSEC) 20 mg delayed release capsule    omeprazole (PriLOSEC) 20 mg delayed release capsule    rOPINIRole (REQUIP) 0 25 mg tablet    Specialty Vitamins Products (MG-PLUS PROTEIN PO)    traMADol (ULTRAM) 50 mg tablet    acarbose (PRECOSE) 25 mg tablet    alendronate (FOSAMAX) 70 mg tablet    ampicillin (PRINCIPEN) 250 mg capsule    Ascorbic Acid (JC-C PO)    atorvastatin (LIPITOR) 20 mg tablet    cholestyramine (QUESTRAN) 4 g packet    cyanocobalamin 1,000 mcg/mL    Cyanocobalamin 1000 MCG/ML KIT    diclofenac sodium (VOLTAREN) 1 %    dicyclomine (BENTYL) 20 mg tablet    EPINEPHrine (EPIPEN 2-PAULINA) 0 3 mg/0 3 mL SOAJ    estradiol (ESTRACE) 0 1 mg/g vaginal cream    fentaNYL (DURAGESIC) 25 mcg/hr    guaifenesin-codeine (CHERATUSSIN AC) 100-10 MG/5ML liquid    HYDROcodone-acetaminophen (NORCO) 5-325 mg per tablet    hydroxychloroquine (PLAQUENIL) 200 mg tablet    meloxicam (MOBIC) 15 mg tablet    methocarbamol (ROBAXIN) 750 mg tablet    montelukast (SINGULAIR) 10 mg tablet    morphine (MSIR) 15 mg tablet    oseltamivir (TAMIFLU) 75 mg capsule    pantoprazole (PROTONIX) 40 mg tablet    polyethylene glycol-electrolytes (NULYTELY) 4000 mL solution    promethazine-dextromethorphan (PHENERGAN-DM) 6 25-15 mg/5 mL oral syrup    tiZANidine (ZANAFLEX) 4 mg tablet    zolpidem (AMBIEN CR) 6 25 MG CR tablet    Allergies   Allergen Reactions    Hydrocodone Other (See Comments)    Medical Tape     Shellfish-Derived Products      Other reaction(s): Unknown    Shrimp Extract Allergy Skin Test      Other reaction(s): Unknown  Other reaction(s): Unknown    Clarithromycin Rash    Wound Dressing Adhesive Rash     Other reaction(s): rash  Other reaction(s): rash           Objective     Blood pressure 130/88, height 5' 8" (1 727 m), weight 102 kg (224 lb)  Body mass index is 34 06 kg/m²  PHYSICAL EXAM:      General Appearance:   Alert, cooperative, no distress   HEENT:   Normocephalic, atraumatic, anicteric      Neck:  Supple, symmetrical, trachea midline   Lungs:   Clear to auscultation bilaterally; no rales, rhonchi or wheezing; respirations unlabored    Heart[de-identified]   Regular rate and rhythm; no murmur, rub, or gallop  Abdomen:   Soft, non-tender, non-distended; normal bowel sounds; no masses, no organomegaly    Genitalia:   Deferred    Rectal:   Deferred    Extremities:  No cyanosis, clubbing or edema    Pulses:  2+ and symmetric    Skin:  No jaundice, rashes, or lesions    Lymph nodes:  No palpable cervical lymphadenopathy        Lab Results:   No visits with results within 1 Day(s) from this visit  Latest known visit with results is:   No results found for any previous visit  Radiology Results:   No results found

## 2019-09-13 ENCOUNTER — ANESTHESIA EVENT (OUTPATIENT)
Dept: GASTROENTEROLOGY | Facility: HOSPITAL | Age: 64
End: 2019-09-13

## 2019-09-13 ENCOUNTER — ANESTHESIA (OUTPATIENT)
Dept: GASTROENTEROLOGY | Facility: HOSPITAL | Age: 64
End: 2019-09-13

## 2019-09-13 ENCOUNTER — HOSPITAL ENCOUNTER (OUTPATIENT)
Dept: GASTROENTEROLOGY | Facility: HOSPITAL | Age: 64
Setting detail: OUTPATIENT SURGERY
Discharge: HOME | End: 2019-09-13
Attending: INTERNAL MEDICINE | Admitting: INTERNAL MEDICINE
Payer: MEDICARE

## 2019-09-13 VITALS
HEIGHT: 64 IN | BODY MASS INDEX: 38.45 KG/M2 | HEART RATE: 51 BPM | OXYGEN SATURATION: 99 % | DIASTOLIC BLOOD PRESSURE: 67 MMHG | SYSTOLIC BLOOD PRESSURE: 112 MMHG | RESPIRATION RATE: 17 BRPM | TEMPERATURE: 97.4 F

## 2019-09-13 DIAGNOSIS — K59.1 FUNCTIONAL DIARRHEA: ICD-10-CM

## 2019-09-13 LAB — GLUCOSE SERPL-MCNC: 90 MG/DL (ref 65–140)

## 2019-09-13 PROCEDURE — 88305 TISSUE EXAM BY PATHOLOGIST: CPT | Performed by: PATHOLOGY

## 2019-09-13 PROCEDURE — 1124F ACP DISCUSS-NO DSCNMKR DOCD: CPT | Performed by: INTERNAL MEDICINE

## 2019-09-13 PROCEDURE — 45380 COLONOSCOPY AND BIOPSY: CPT | Performed by: INTERNAL MEDICINE

## 2019-09-13 PROCEDURE — 82948 REAGENT STRIP/BLOOD GLUCOSE: CPT

## 2019-09-13 RX ORDER — SODIUM CHLORIDE, SODIUM LACTATE, POTASSIUM CHLORIDE, CALCIUM CHLORIDE 600; 310; 30; 20 MG/100ML; MG/100ML; MG/100ML; MG/100ML
125 INJECTION, SOLUTION INTRAVENOUS CONTINUOUS
Status: DISCONTINUED | OUTPATIENT
Start: 2019-09-13 | End: 2019-09-17 | Stop reason: HOSPADM

## 2019-09-13 RX ORDER — PROPOFOL 10 MG/ML
INJECTION, EMULSION INTRAVENOUS AS NEEDED
Status: DISCONTINUED | OUTPATIENT
Start: 2019-09-13 | End: 2019-09-13 | Stop reason: SURG

## 2019-09-13 RX ORDER — ONDANSETRON 2 MG/ML
4 INJECTION INTRAMUSCULAR; INTRAVENOUS ONCE AS NEEDED
Status: CANCELLED | OUTPATIENT
Start: 2019-09-13

## 2019-09-13 RX ORDER — LIDOCAINE HYDROCHLORIDE 10 MG/ML
INJECTION, SOLUTION EPIDURAL; INFILTRATION; INTRACAUDAL; PERINEURAL AS NEEDED
Status: DISCONTINUED | OUTPATIENT
Start: 2019-09-13 | End: 2019-09-13 | Stop reason: SURG

## 2019-09-13 RX ADMIN — PROPOFOL 100 MG: 10 INJECTION, EMULSION INTRAVENOUS at 08:11

## 2019-09-13 RX ADMIN — PROPOFOL 40 MG: 10 INJECTION, EMULSION INTRAVENOUS at 08:13

## 2019-09-13 RX ADMIN — LIDOCAINE HYDROCHLORIDE 100 MG: 10 INJECTION, SOLUTION EPIDURAL; INFILTRATION; INTRACAUDAL; PERINEURAL at 08:11

## 2019-09-13 RX ADMIN — SODIUM CHLORIDE, SODIUM LACTATE, POTASSIUM CHLORIDE, AND CALCIUM CHLORIDE 125 ML/HR: .6; .31; .03; .02 INJECTION, SOLUTION INTRAVENOUS at 07:50

## 2019-09-13 RX ADMIN — PROPOFOL 20 MG: 10 INJECTION, EMULSION INTRAVENOUS at 08:15

## 2019-09-13 RX ADMIN — SODIUM CHLORIDE, SODIUM LACTATE, POTASSIUM CHLORIDE, AND CALCIUM CHLORIDE: .6; .31; .03; .02 INJECTION, SOLUTION INTRAVENOUS at 08:12

## 2019-09-13 RX ADMIN — PROPOFOL 40 MG: 10 INJECTION, EMULSION INTRAVENOUS at 08:21

## 2019-09-13 RX ADMIN — PROPOFOL 20 MG: 10 INJECTION, EMULSION INTRAVENOUS at 08:18

## 2019-09-13 RX ADMIN — PROPOFOL 30 MG: 10 INJECTION, EMULSION INTRAVENOUS at 08:23

## 2019-09-13 RX ADMIN — PROPOFOL 20 MG: 10 INJECTION, EMULSION INTRAVENOUS at 08:19

## 2019-09-13 NOTE — ANESTHESIA POSTPROCEDURE EVALUATION
Post-Op Assessment Note    CV Status:  Stable  Pain Score: 0    Pain management: adequate     Mental Status:  Alert and awake   Hydration Status:  Euvolemic   PONV Controlled:  Controlled   Airway Patency:  Patent and adequate   Post Op Vitals Reviewed: Yes      Staff: CRNA           BP   102/55   Temp     Pulse 71   Resp   13   SpO2   94%

## 2019-09-13 NOTE — ANESTHESIA PREPROCEDURE EVALUATION
Review of Systems/Medical History  Patient summary reviewed  Chart reviewed  No history of anesthetic complications     Cardiovascular  Exercise tolerance (METS): >4,  Hypertension ,    Pulmonary       GI/Hepatic    GERD ,             Endo/Other    Obesity    GYN       Hematology   Musculoskeletal    Arthritis     Neurology   Psychology     Chronic opioid dependence Chronic pain,                 Anesthesia Plan  ASA Score- 2     Anesthesia Type- IV sedation with anesthesia with ASA Monitors  Additional Monitors:   Airway Plan:         Plan Factors-    Induction- intravenous  Postoperative Plan-     Informed Consent- Anesthetic plan and risks discussed with patient  I personally reviewed this patient with the CRNA  Discussed and agreed on the Anesthesia Plan with the CRNA  Anthony Claudio

## 2019-09-13 NOTE — DISCHARGE INSTRUCTIONS
Colonoscopy   WHAT YOU NEED TO KNOW:   A colonoscopy is a procedure to examine the inside of your colon (intestine) with a scope  Polyps or tissue growths may have been removed during your colonoscopy  It is normal to feel bloated and to have some abdominal discomfort  You should be passing gas  If you have hemorrhoids or you had polyps removed, you may have a small amount of bleeding  DISCHARGE INSTRUCTIONS:   Seek care immediately if:   · You have a large amount of bright red blood in your bowel movements  · Your abdomen is hard and firm and you have severe pain  · You have sudden trouble breathing  Contact your healthcare provider if:   · You develop a rash or hives  · You have a fever within 24 hours of your procedure  · You have not had a bowel movement for 3 days after your procedure  · You have questions or concerns about your condition or care  Activity:   · Do not lift, strain, or run  for 3 days after your procedure  · Rest after your procedure  You have been given medicine to relax you  Do not  drive or make important decisions until the day after your procedure  Return to your normal activity as directed  · Relieve gas and discomfort from bloating  by lying on your right side with a heating pad on your abdomen  You may need to take short walks to help the gas move out  Eat small meals until bloating is relieved  If you had polyps removed: For 7 days after your procedure:  · Do not  take aspirin  · Do not  go on long car rides  Help prevent constipation:   · Eat a variety of healthy foods  Healthy foods include fruit, vegetables, whole-grain breads, low-fat dairy products, beans, lean meat, and fish  Ask if you need to be on a special diet  Your healthcare provider may recommend that you eat high-fiber foods such as cooked beans  Fiber helps you have regular bowel movements  · Drink liquids as directed    Adults should drink between 9 and 13 eight-ounce cups of liquid every day  Ask what amount is best for you  For most people, good liquids to drink are water, juice, and milk  · Exercise as directed  Talk to your healthcare provider about the best exercise plan for you  Exercise can help prevent constipation, decrease your blood pressure and improve your health  Follow up with your healthcare provider as directed:  Write down your questions so you remember to ask them during your visits  © 2017 [employer] Information is for End User's use only and may not be sold, redistributed or otherwise used for commercial purposes  All illustrations and images included in CareNotes® are the copyrighted property of [employer]  or [employer] Chowdh[employer]  The above information is an  only  It is not intended as medical advice for individual conditions or treatments  Talk to your doctor, nurse or pharmacist before following any medical regimen to see if it is safe and effective for you

## 2019-09-13 NOTE — H&P
History and Physical - [facility]  Gabriele Cheng 58 y o  female MRN: [id number]      HPI: Gabriele Cheng is a 58y o  year old female who presents for personal history of colon polyps, diarrhea      REVIEW OF SYSTEMS: Per the HPI, and otherwise unremarkable  Historical Information   Past Medical History:   Diagnosis Date    Arthritis     Hypertension     Osteoporosis      Past Surgical History:   Procedure Laterality Date    KNEE SURGERY       Social History   Social History     Substance and Sexual Activity   Alcohol Use Not Currently     Social History     Substance and Sexual Activity   Drug Use Not Currently     Social History     Tobacco Use   Smoking Status Former Smoker   Smokeless Tobacco Never Used     Family History   Problem Relation Age of Onset    Diabetes Mother     Cancer Mother         lung cacer    Kidney disease Father     Heart disease Father     Cancer Father         bladder cancer       Meds/Allergies       (Not in a hospital admission)    Allergies   Allergen Reactions    Hydrocodone Other (See Comments)    Medical Tape     Morphine Itching    Shellfish-Derived Products      Other reaction(s): Unknown    Shrimp Extract Allergy Skin Test      Other reaction(s): Unknown  Other reaction(s): Unknown    Clarithromycin Rash    Wound Dressing Adhesive Rash     Other reaction(s): rash  Other reaction(s): rash       Objective     There were no vitals taken for this visit  PHYSICAL EXAM    Gen: NAD  CV: RRR  CHEST: Clear  ABD: soft, NT/ND  EXT: no edema      ASSESSMENT/PLAN:  This is a 58y o  year old female here for colonoscopy with biopsies, and she is stable and optimized for her procedure

## 2019-09-24 ENCOUNTER — TELEPHONE (OUTPATIENT)
Dept: GASTROENTEROLOGY | Facility: CLINIC | Age: 64
End: 2019-09-24

## 2019-09-24 NOTE — TELEPHONE ENCOUNTER
Devin pt - Pt states medication is not helping her, she is still having the same problems/accidents  Please call [phone]   Ty

## 2020-01-07 DIAGNOSIS — R10.84 GENERALIZED ABDOMINAL PAIN: Primary | ICD-10-CM

## 2020-01-07 RX ORDER — DICYCLOMINE HCL 20 MG
20 TABLET ORAL EVERY 6 HOURS
Qty: 360 TABLET | Refills: 3 | Status: SHIPPED | OUTPATIENT
Start: 2020-01-07

## 2020-01-07 NOTE — TELEPHONE ENCOUNTER
Christina 84 called [facility]mom - refill dicylomine 20 mg t tablet 30 day supply   Please call [facility] [phone] ty

## 2020-02-07 RX ORDER — PREDNISOLONE ACETATE 10 MG/ML
SUSPENSION/ DROPS OPHTHALMIC
COMMUNITY
Start: 2019-11-14 | End: 2021-02-23

## 2020-02-07 RX ORDER — LATANOPROST 50 UG/ML
SOLUTION/ DROPS OPHTHALMIC
COMMUNITY
Start: 2019-11-27 | End: 2021-02-23

## 2020-02-07 RX ORDER — AMOXICILLIN AND CLAVULANATE POTASSIUM 875; 125 MG/1; MG/1
TABLET, FILM COATED ORAL
COMMUNITY
Start: 2019-11-12 | End: 2021-02-23

## 2020-02-07 RX ORDER — POLYMYXIN B SULFATE AND TRIMETHOPRIM 1; 10000 MG/ML; [USP'U]/ML
SOLUTION OPHTHALMIC
COMMUNITY
Start: 2019-11-14 | End: 2021-02-23

## 2020-02-07 RX ORDER — ATOMOXETINE 40 MG/1
CAPSULE ORAL
COMMUNITY

## 2020-02-07 RX ORDER — DORZOLAMIDE HYDROCHLORIDE AND TIMOLOL MALEATE 20; 5 MG/ML; MG/ML
SOLUTION/ DROPS OPHTHALMIC
COMMUNITY

## 2020-02-07 RX ORDER — ERYTHROMYCIN 5 MG/G
OINTMENT OPHTHALMIC
COMMUNITY
End: 2021-02-23

## 2020-02-07 RX ORDER — ATOMOXETINE 18 MG/1
CAPSULE ORAL
COMMUNITY
Start: 2019-11-22 | End: 2021-02-23

## 2020-02-07 RX ORDER — BRIMONIDINE TARTRATE 2 MG/ML
SOLUTION/ DROPS OPHTHALMIC
COMMUNITY
Start: 2019-11-27 | End: 2021-02-23

## 2020-02-07 RX ORDER — MELOXICAM 15 MG/1
TABLET ORAL
COMMUNITY
Start: 2020-01-06

## 2020-02-07 RX ORDER — CETIRIZINE HYDROCHLORIDE 10 MG/1
10 TABLET ORAL DAILY
COMMUNITY
Start: 2019-11-12 | End: 2022-05-26

## 2020-02-07 RX ORDER — CETIRIZINE HYDROCHLORIDE 10 MG/1
10 TABLET ORAL DAILY
COMMUNITY
Start: 2019-12-09 | End: 2021-02-23

## 2020-02-07 RX ORDER — METHOCARBAMOL 500 MG/1
500 TABLET, FILM COATED ORAL 3 TIMES DAILY
COMMUNITY
Start: 2019-11-12

## 2020-02-07 RX ORDER — MELOXICAM 15 MG/1
TABLET ORAL DAILY
COMMUNITY
End: 2021-02-23

## 2020-02-07 RX ORDER — METHOCARBAMOL 500 MG/1
TABLET, FILM COATED ORAL
COMMUNITY
End: 2021-02-23

## 2020-02-07 RX ORDER — ATOMOXETINE 25 MG/1
25 CAPSULE ORAL DAILY
COMMUNITY
End: 2021-02-23

## 2020-02-07 RX ORDER — FLUTICASONE PROPIONATE 50 MCG
SPRAY, SUSPENSION (ML) NASAL
COMMUNITY
Start: 2019-11-12 | End: 2022-05-26 | Stop reason: SDUPTHER

## 2020-02-12 ENCOUNTER — OFFICE VISIT (OUTPATIENT)
Dept: GASTROENTEROLOGY | Facility: CLINIC | Age: 65
End: 2020-02-12
Payer: MEDICARE

## 2020-02-12 ENCOUNTER — TELEPHONE (OUTPATIENT)
Dept: OTHER | Facility: OTHER | Age: 65
End: 2020-02-12

## 2020-02-12 VITALS
SYSTOLIC BLOOD PRESSURE: 134 MMHG | WEIGHT: 242.8 LBS | DIASTOLIC BLOOD PRESSURE: 86 MMHG | BODY MASS INDEX: 36.8 KG/M2 | HEART RATE: 67 BPM | HEIGHT: 68 IN

## 2020-02-12 DIAGNOSIS — K59.1 FUNCTIONAL DIARRHEA: Primary | ICD-10-CM

## 2020-02-12 PROCEDURE — 99214 OFFICE O/P EST MOD 30 MIN: CPT | Performed by: INTERNAL MEDICINE

## 2020-02-12 RX ORDER — LAMOTRIGINE 200 MG/1
TABLET ORAL
COMMUNITY
Start: 2020-01-23

## 2020-02-12 NOTE — PROGRESS NOTES
[facility] - Outpatient Follow-up Note  Evelyn Salcido 71 y o  female MRN: [id number]  Encounter: [id number]          ASSESSMENT AND PLAN:      1  Functional diarrhea    Trial of Viberzi 100 mg bid for the next 32 days  Free samples were given  F/U in one month    ______________________________________________________________________    SUBJECTIVE:  This 71year old female presents with frequent diarrhea most days of the week  Sometimes she will go 1 or 2 days without a bowel movement  She has been having episodes of incontinence several times a week because of the diarrhea  She denies any rectal bleeding  She does admit to lower abdominal pains  She has been tried on Questran without benefit  She has been through courses of probiotics and antibiotics such as Xifaxan without significant benefit  She does take Imodium on an as-needed basis with some relief of her symptoms  One of the next plans was to try the Mcgregor's G  She complains she is already taking some any medications which is causing a lot of money  Her colonoscopy performed September 3, 2019 was unremarkable  Biopsies of the colon at both the ascending and sigmoid were negative for microscopic or collagenous colitis  REVIEW OF SYSTEMS IS OTHERWISE NEGATIVE        Historical Information   Past Medical History:   Diagnosis Date    Arthritis     Colon polyp     CPAP (continuous positive airway pressure) dependence     Fibromyalgia, primary     Hypertension     Hypoglycemia     Irritable bowel syndrome     Osteoporosis     Sleep apnea     Stenosis of cervical spine     and neck     Past Surgical History:   Procedure Laterality Date    CARPAL TUNNEL RELEASE      CHOLECYSTECTOMY      COLONOSCOPY      GASTRIC BYPASS      HYSTERECTOMY      KNEE SURGERY      SINUS SURGERY      TONSILLECTOMY       Social History   Social History     Substance and Sexual Activity   Alcohol Use Yes    Frequency: Monthly or less     Social History     Substance and Sexual Activity   Drug Use Not Currently     Social History     Tobacco Use   Smoking Status Former Smoker   Smokeless Tobacco Never Used     Family History   Problem Relation Age of Onset    Diabetes Mother     Cancer Mother         lung cacer    Kidney disease Father     Heart disease Father     Cancer Father         bladder cancer       Meds/Allergies       Current Outpatient Medications:     Ascorbic Acid (JC-C PO)    atoMOXetine (STRATTERA) 40 mg capsule    busPIRone (BUSPAR) 10 mg tablet    cetirizine (ZYRTEC ALLERGY) 10 mg tablet    Cholecalciferol 38032 units capsule    clonazePAM (KlonoPIN) 0 5 mg tablet    cyanocobalamin 1,000 mcg/mL    diclofenac sodium (VOLTAREN) 1 %    dicyclomine (BENTYL) 20 mg tablet    dorzolamide-timolol (COSOPT) 22 3-6 8 MG/ML ophthalmic solution    DULoxetine (CYMBALTA) 60 mg delayed release capsule    folic acid (FOLVITE) 1 mg tablet    gabapentin (NEURONTIN) 300 mg capsule    gabapentin (NEURONTIN) 600 MG tablet    guaifenesin-codeine (CHERATUSSIN AC) 100-10 MG/5ML liquid    hydrochlorothiazide (HYDRODIURIL) 25 mg tablet    lamoTRIgine (LaMICtal) 200 MG tablet    latanoprost (XALATAN) 0 005 % ophthalmic solution    losartan (COZAAR) 100 MG tablet    Magnesium 300 MG CAPS    meloxicam (MOBIC) 15 mg tablet    methocarbamol (ROBAXIN) 500 mg tablet    modafinil (PROVIGIL) 100 mg tablet    mometasone (ELOCON) 0 1 % lotion    naproxen (NAPROSYN) 500 mg tablet    omeprazole (PriLOSEC) 20 mg delayed release capsule    polymyxin b-trimethoprim (POLYTRIM) ophthalmic solution    prednisoLONE acetate (PRED FORTE) 1 % ophthalmic suspension    promethazine-dextromethorphan (PHENERGAN-DM) 6 25-15 mg/5 mL oral syrup    rOPINIRole (REQUIP) 0 25 mg tablet    tetanus-diphtheria-acellular pertussis (BOOSTRIX) injection    Zoster Vac Recomb Adjuvanted (SHINGRIX) 50 MCG/0 5ML SUSR    alendronate (FOSAMAX) 70 mg tablet    amoxicillin-clavulanate (AUGMENTIN) 875-125 mg per tablet    atoMOXetine (STRATTERA) 18 mg capsule    atoMOXetine (STRATTERA) 25 mg capsule    brimonidine tartrate 0 2 % ophthalmic solution    cetirizine (ZyrTEC) 10 mg tablet    cholestyramine (QUESTRAN) 4 g packet    EPINEPHrine (EPIPEN 2-PAULINA) 0 3 mg/0 3 mL SOAJ    erythromycin (ILOTYCIN) ophthalmic ointment    estradiol (ESTRACE) 0 1 mg/g vaginal cream    fluticasone (FLONASE) 50 mcg/act nasal spray    ketoconazole (NIZORAL) 2 % shampoo    lamoTRIgine (LaMICtal) 100 mg tablet    meloxicam (MOBIC) 15 mg tablet    methocarbamol (ROBAXIN) 500 mg tablet    oseltamivir (TAMIFLU) 75 mg capsule    polyethylene glycol-electrolytes (NULYTELY) 4000 mL solution    tiZANidine (ZANAFLEX) 4 mg tablet    traMADol (ULTRAM) 50 mg tablet    Allergies   Allergen Reactions    Hydrocodone Other (See Comments)    Medical Tape     Morphine Itching    Shellfish-Derived Products      Other reaction(s): Unknown    Shrimp Extract Allergy Skin Test      Other reaction(s): Unknown  Other reaction(s): Unknown    Clarithromycin Rash    Wound Dressing Adhesive Rash     Other reaction(s): rash  Other reaction(s): rash           Objective     Blood pressure 134/86, pulse 67, height 5' 8" (1 727 m), weight 110 kg (242 lb 12 8 oz)  Body mass index is 36 92 kg/m²  PHYSICAL EXAM:      General Appearance:   Alert, cooperative, no distress   HEENT:   Normocephalic, atraumatic, anicteric      Neck:  Supple, symmetrical, trachea midline   Lungs:   Clear to auscultation bilaterally; no rales, rhonchi or wheezing; respirations unlabored    Heart[de-identified]   Regular rate and rhythm; no murmur, rub, or gallop     Abdomen:   Soft, non-tender, non-distended; normal bowel sounds; no masses, no organomegaly    Genitalia:   Deferred    Rectal:   Deferred    Extremities:  No cyanosis, clubbing or edema    Pulses:  2+ and symmetric    Skin:  No jaundice, rashes, or lesions    Lymph nodes:  No palpable cervical lymphadenopathy        Lab Results:   No visits with results within 1 Day(s) from this visit  Latest known visit with results is:   Hospital Outpatient Visit on 09/13/2019   Component Date Value    POC Glucose 09/13/2019 90     Case Report 09/13/2019                      Value:Surgical Pathology Report                         Case: [id number]                                   Authorizing Provider:  Isaac Hernandez DO          Collected:           09/13/2019 7105              Ordering Location:      [facility]       Received:            09/13/2019 [facility]                                                             Pathologist:           Erika Gilliland MD                                                                Specimen:    Colon, ascending and sigmoid                                                               Final Diagnosis 09/13/2019                      Value: This result contains rich text formatting which cannot be displayed here   Note 09/13/2019                      Value: This result contains rich text formatting which cannot be displayed here   Additional Information 09/13/2019                      Value: This result contains rich text formatting which cannot be displayed here  Sula Drop Gross Description 09/13/2019                      Value: This result contains rich text formatting which cannot be displayed here   Clinical Information 09/13/2019                      Value:Functional diarrhea         Radiology Results:   No results found

## 2020-02-13 NOTE — TELEPHONE ENCOUNTER
OLIVER FLETCHER @ 1910 TO DR Maurilio Nguyen  [phone] A/S PT Paulina Sidhu/1955/REASON PATIENT IS EXPERIENCING SEVER ABDOMINAL PAIN AFTER STARTING NEW MICATION CALLED Viberzi 100 mg

## 2020-02-13 NOTE — TELEPHONE ENCOUNTER
I spoke to her last night in told her that the pain should resolve  She reports it was a minor pain with no fevers chills nausea vomiting melena hematochezia  I told her to not take the medicine for a day and then give it another try    I told her that the medicine can cause pancreatitis and that if she has severe unrelenting pain that is worsening she has to go to the emergency room for evaluation

## 2020-03-12 DIAGNOSIS — K21.9 GASTROESOPHAGEAL REFLUX DISEASE WITHOUT ESOPHAGITIS: ICD-10-CM

## 2020-03-12 RX ORDER — OMEPRAZOLE 20 MG/1
20 CAPSULE, DELAYED RELEASE ORAL 2 TIMES DAILY
Qty: 180 CAPSULE | Refills: 3 | Status: SHIPPED | OUTPATIENT
Start: 2020-03-12 | End: 2021-02-23

## 2020-03-24 ENCOUNTER — TELEPHONE (OUTPATIENT)
Dept: GASTROENTEROLOGY | Facility: CLINIC | Age: 65
End: 2020-03-24

## 2020-03-24 DIAGNOSIS — R19.7 DIARRHEA, UNSPECIFIED TYPE: Primary | ICD-10-CM

## 2020-03-24 RX ORDER — ALOSETRON HYDROCHLORIDE 1 MG/1
1 TABLET, FILM COATED ORAL DAILY
Qty: 30 TABLET | Refills: 1 | Status: SHIPPED | OUTPATIENT
Start: 2020-03-24

## 2020-03-24 NOTE — TELEPHONE ENCOUNTER
Per her chart she failed imodium and Jamee  Called in lotronex but unsure how much this will cost her    Could try lomotil or increase dose of quetran/colestid

## 2020-03-24 NOTE — TELEPHONE ENCOUNTER
Devin pt - Pt states that the Eligah Solo is over $300 at the pharmacy  She wants to know if there is something comparable that can be called in that may cost less  Please call [phone]   Ty

## 2020-03-26 ENCOUNTER — TELEPHONE (OUTPATIENT)
Dept: GASTROENTEROLOGY | Facility: CLINIC | Age: 65
End: 2020-03-26

## 2020-03-26 RX ORDER — CHOLECALCIFEROL (VITAMIN D3) 125 MCG
CAPSULE ORAL
COMMUNITY
Start: 2020-02-21

## 2020-03-26 RX ORDER — POTASSIUM CHLORIDE 750 MG/1
10 TABLET, EXTENDED RELEASE ORAL
COMMUNITY

## 2020-03-26 RX ORDER — OMEPRAZOLE 20 MG/1
20 CAPSULE, DELAYED RELEASE ORAL DAILY
COMMUNITY
Start: 2019-03-12 | End: 2021-02-23

## 2020-03-26 RX ORDER — DULOXETIN HYDROCHLORIDE 60 MG/1
60 CAPSULE, DELAYED RELEASE ORAL DAILY
COMMUNITY

## 2020-03-26 NOTE — TELEPHONE ENCOUNTER
Devin pt- Pt states the second medication that was called in is still too expensive  Pt is almost out of medication  Can something else be done? Pt needs something more affordable  The viberzi worked well for pt but it is hundreds of dollars  Pt scheduled a virtual appt with Dr Will Dacosta tomorrow

## 2020-03-27 ENCOUNTER — TELEMEDICINE (OUTPATIENT)
Dept: GASTROENTEROLOGY | Facility: CLINIC | Age: 65
End: 2020-03-27
Payer: MEDICARE

## 2020-03-27 DIAGNOSIS — K21.9 GASTROESOPHAGEAL REFLUX DISEASE WITHOUT ESOPHAGITIS: ICD-10-CM

## 2020-03-27 DIAGNOSIS — R07.9 CHEST PAIN DUE TO GASTROINTESTINAL REFLUX DISEASE: Primary | ICD-10-CM

## 2020-03-27 DIAGNOSIS — K21.9 CHEST PAIN DUE TO GASTROINTESTINAL REFLUX DISEASE: Primary | ICD-10-CM

## 2020-03-27 PROCEDURE — 99214 OFFICE O/P EST MOD 30 MIN: CPT | Performed by: INTERNAL MEDICINE

## 2020-03-27 NOTE — PROGRESS NOTES
Virtual Regular Visit    Problem List Items Addressed This Visit     None      Visit Diagnoses     Chest pain due to gastrointestinal reflux disease    -  Primary    Relevant Medications    omeprazole (PriLOSEC) 20 mg delayed release capsule    Gastroesophageal reflux disease without esophagitis        Relevant Medications    omeprazole (PriLOSEC) 20 mg delayed release capsule        1  Chest pain due to gastrointestinal reflux disease    2  Gastroesophageal reflux disease without esophagitis    3  Diarrhea, chronic, secondary to irritable bowel syndrome    Plan:  1  Continue omeprazole but increase the dose every day to 20 mg twice daily to be taken half an hour before breakfast and half an hour before supper time  2  Follow the most important behavioral modification and be certain that she does not lie down within to hours of eating or drinking  3  Do not drink a lot of water just before bedtime  4  A routine and elective esophagogastroduodenoscopy will be medically necessary in the future when the pandemic is over           Reason for visit is a routine follow-up for the evaluation of chest pain, poorly controlled gastroesophageal reflux disease, and her known history diarrhea secondary to irritable bowel syndrome    Encounter provider Yohan Weber DO    Provider located at [address] [facility] [address]  [address] [facility] [address]  [place] [place] [address]  [phone]      Recent Visits  Date Type Provider Dept   03/26/20 Telephone [facility]   03/24/20 Telephone [facility] [address]   Showing recent visits within past 7 days and meeting all other requirements     Today's Visits  Date Type Provider Dept   03/27/20 [facility] DO [facility] [address] today's visits and meeting all other requirements     Future Appointments  No visits were found meeting these conditions     Showing future appointments within next 150 days and meeting all other requirements        After connecting through [employer], the patient was identified by name and date of birth  Evi Luna was informed that this is a telemedicine visit and that the visit is being conducted through [facility] which may not be secure and therefore, might not be HIPAA-compliant  My office door was closed  No one else was in the room  She acknowledged consent and understanding of privacy and security of the video platform  The patient has agreed to participate and understands they can discontinue the visit at any time  The the regarding the virtual visit disclaimer, the patient acknowledges that she has consented to an online visit and she understands that this visit is based solely on information provided by the epic database and the virtual communication in the absence of face-to-face physical examination by the physician  This is both a limited and provisional evaluation in terms of accuracy and completeness  Subjective  Evi Luna is a 71 y o  female who was seen 1 month ago in our office for the evaluation and treatment of her chronic diarrhea related to irritable bowel syndrome  The patient was given the Viberzi and told to take this medication at 100 mg twice daily  Due to the cost of the medication the patient was able to  is single prescription but she is now taking the medication at 1 time per day  She states that the medication is working very very well  Diarrhea episodes have stopped altogether  Patient denies any abdominal pain, bloating, gaseousness, constipation, rectal bleeding, melena, hematemesis, weight loss  Patient states however that she was seen in the emergency room on February 28, 2020 for the evaluation of chest pain  She states that she was woken from sleep in the early hours of the morning with a chest pain  It was a pressure-like sensation    She had never had a similar symptoms like this in the past   She does admit to longstanding history of gastroesophageal reflux disease complicated by hiatal hernia and states that she has several episodes of heartburn symptoms despite taking Prilosec 20 mg once daily  Sometimes she does take Prilosec twice daily but she is not doing this on a regular basis  A full cardiac workup was performed in the emergency room with negative troponins  She underwent a nuclear medicine exercise scan and the results showed no evidence of ischemia  Her last esophagogastroduodenoscopy was performed many years ago, more than 5 years ago        Past Medical History:   Diagnosis Date    Arthritis     Colon polyp     CPAP (continuous positive airway pressure) dependence     Fibromyalgia, primary     Hypertension     Hypoglycemia     Irritable bowel syndrome     Osteoporosis     Sleep apnea     Stenosis of cervical spine     and neck       Past Surgical History:   Procedure Laterality Date    CARPAL TUNNEL RELEASE      CHOLECYSTECTOMY      COLONOSCOPY      GASTRIC BYPASS      HYSTERECTOMY      KNEE SURGERY      SINUS SURGERY      TONSILLECTOMY         Current Outpatient Medications   Medication Sig Dispense Refill    Cyanocobalamin 1000 MCG/ML KIT INJECT 1 ML AS DIRECTED EVERY 30 DAYS      omeprazole (PriLOSEC) 20 mg delayed release capsule Take 20 mg by mouth daily      alendronate (FOSAMAX) 70 mg tablet Take by mouth      alosetron (LOTRONEX) 1 MG tablet Take 1 tablet (1 mg total) by mouth daily 30 tablet 1    amoxicillin-clavulanate (AUGMENTIN) 875-125 mg per tablet       Ascorbic Acid (JC-C PO) 1      aspirin 81 MG tablet Take 81 mg by mouth daily      atoMOXetine (STRATTERA) 18 mg capsule       atoMOXetine (STRATTERA) 25 mg capsule Take 25 mg by mouth daily      atoMOXetine (STRATTERA) 40 mg capsule atomoxetine 40 mg capsule      brimonidine tartrate 0 2 % ophthalmic solution       busPIRone (BUSPAR) 10 mg tablet buspirone 10 mg tablet  cetirizine (ZYRTEC ALLERGY) 10 mg tablet Take 10 mg by mouth daily      cetirizine (ZyrTEC) 10 mg tablet Take 10 mg by mouth daily      Cholecalciferol (VITAMIN D) 125 MCG (5000 UT) CAPS       Cholecalciferol 77210 units capsule cholecalciferol (vitamin D3) 5,000 unit capsule      cholestyramine (QUESTRAN) 4 g packet Take 1 packet (4 g total) by mouth 3 (three) times a day with meals (Patient not taking: Reported on 2/12/2020) 60 packet 1    clonazePAM (KlonoPIN) 0 5 mg tablet clonazepam 0 5 mg tablet      cyanocobalamin 1,000 mcg/mL INJECT 1000 MCG(1 ML) EVERY MONTH INTRAMUSCULARLY  0    diclofenac sodium (VOLTAREN) 1 % Place on the skin      dicyclomine (BENTYL) 20 mg tablet Take 1 tablet (20 mg total) by mouth every 6 (six) hours 360 tablet 3    dorzolamide-timolol (COSOPT) 22 3-6 8 MG/ML ophthalmic solution dorzolamide 22 3 mg-timolol 6 8 mg/mL eye drops      DULoxetine (CYMBALTA) 60 mg delayed release capsule Take by mouth      DULoxetine (Cymbalta) 60 mg delayed release capsule Take 60 mg by mouth daily      Eluxadoline (Viberzi) 100 MG TABS Take 1 tablet by mouth 2 (two) times a day      EPINEPHrine (EPIPEN 2-PAULINA) 0 3 mg/0 3 mL SOAJ EpiPen 2-Paulina 0 3 mg/0 3 mL injection, auto-injector      erythromycin (ILOTYCIN) ophthalmic ointment erythromycin 5 mg/gram (0 5 %) eye ointment      estradiol (ESTRACE) 0 1 mg/g vaginal cream estradiol 0 01% (0 1 mg/gram) vaginal cream      fluticasone (FLONASE) 50 mcg/act nasal spray       folic acid (FOLVITE) 1 mg tablet folic acid 1 mg tablet      gabapentin (NEURONTIN) 300 mg capsule gabapentin 300 mg capsule      gabapentin (NEURONTIN) 600 MG tablet gabapentin 600 mg tablet      guaifenesin-codeine (CHERATUSSIN AC) 100-10 MG/5ML liquid Take by mouth      hydrochlorothiazide (HYDRODIURIL) 25 mg tablet hydrochlorothiazide 25 mg tablet      ketoconazole (NIZORAL) 2 % shampoo ketoconazole 2 % shampoo      lamoTRIgine (LaMICtal) 100 mg tablet lamotrigine 100 mg tablet      lamoTRIgine (LaMICtal) 200 MG tablet       latanoprost (XALATAN) 0 005 % ophthalmic solution       losartan (COZAAR) 100 MG tablet Take by mouth      Magnesium 300 MG CAPS Take 1 tablet by mouth daily      meloxicam (MOBIC) 15 mg tablet Daily      meloxicam (MOBIC) 15 mg tablet       methocarbamol (ROBAXIN) 500 mg tablet methocarbamol 500 mg tablet   TAKE 1 TABLET BY MOUTH THREE TIMES A DAY      methocarbamol (ROBAXIN) 500 mg tablet Take 500 mg by mouth 3 (three) times a day      modafinil (PROVIGIL) 100 mg tablet modafinil 100 mg tablet      mometasone (ELOCON) 0 1 % lotion mometasone 0 1 % topical solution      naproxen (NAPROSYN) 500 mg tablet TAKE 1 TABLET BY MOUTH TWICE A DAY AS DIRECTED      omeprazole (PriLOSEC) 20 mg delayed release capsule Take 1 capsule (20 mg total) by mouth 2 (two) times a day 180 capsule 3    oseltamivir (TAMIFLU) 75 mg capsule Tamiflu 75 mg capsule      polyethylene glycol-electrolytes (NULYTELY) 4000 mL solution peg-electrolyte solution 420 gram oral solution      polymyxin b-trimethoprim (POLYTRIM) ophthalmic solution       potassium chloride (K-DUR,KLOR-CON) 10 mEq tablet Take 10 mEq by mouth      prednisoLONE acetate (PRED FORTE) 1 % ophthalmic suspension       promethazine-dextromethorphan (PHENERGAN-DM) 6 25-15 mg/5 mL oral syrup Take by mouth      rOPINIRole (REQUIP) 0 25 mg tablet ropinirole 0 25 mg tablet      tetanus-diphtheria-acellular pertussis (BOOSTRIX) injection Boostrix Tdap 2 5 Lf unit-8 mcg-5 Lf/0 5 mL intramuscular syringe      tiZANidine (ZANAFLEX) 4 mg tablet Take by mouth      traMADol (ULTRAM) 50 mg tablet TAKE 1 TABLET BY MOUTH TWICE A DAY AS NEEDED FOR 30 DAYS      Zoster Vac Recomb Adjuvanted (SHINGRIX) 50 MCG/0 5ML SUSR Shingrix (PF) 50 mcg/0 5 mL intramuscular suspension, kit       No current facility-administered medications for this visit           Allergies   Allergen Reactions    Hydrocodone Other (See Comments)    Medical Tape     Morphine Itching    Shellfish-Derived Products      Other reaction(s): Unknown    Shrimp Extract Allergy Skin Test      Other reaction(s): Unknown  Other reaction(s): Unknown    Clarithromycin Rash    Wound Dressing Adhesive Rash     Other reaction(s): rash  Other reaction(s): rash       Review of Systems  Constitutional, denies fever chills  HEENT:  Denies earache or hearing loss  Cardiovascular:  Denies current chest pain or palpitations since February 28, 2020  Respiratory:  Denies cough or shortness of breath  Gastrointestinal:  As noted in the history of present illness  Genitourinary:  No problems with urination, hematuria, or dysuria  Neurological:  No headache or dizziness  Musculoskeletal:  Denies joint pains or muscle aches  Skin:  Denies skin rash her aging  Endocrine:  Denies excessive thirst or intolerance to heat or cold    Physical Exam   Constitutional:  Patient appears normal, obese  Eyes:  Patient is nonicteric  ENT:  No audible congestion  Respiratory:  Normal effort to breathe without coughing  Cardiovascular:  No lower extremity edema and no evidence of jugular venous distention  Gastrointestinal:  Obese without abdominal distention  Skin:  No rashes or jaundice  Musculoskeletal:  Normal range of motion  Psychiatric her mood and affect appear normal without anxiety  Neuro:  No facial asymmetry, patient was alert and oriented and her speech was normal    I spent 17 minutes with the patient during this visit

## 2020-04-30 ENCOUNTER — TELEPHONE (OUTPATIENT)
Dept: GASTROENTEROLOGY | Facility: CLINIC | Age: 65
End: 2020-04-30

## 2020-08-04 ENCOUNTER — TELEPHONE (OUTPATIENT)
Dept: GASTROENTEROLOGY | Facility: CLINIC | Age: 65
End: 2020-08-04

## 2020-08-04 NOTE — TELEPHONE ENCOUNTER
Dr Lemons Course - Patient called requesting samples of Viberzi 75 mg twice daily   Please call Rikki Sommers when ready to be pcked up [phone]

## 2021-01-13 ENCOUNTER — TELEPHONE (OUTPATIENT)
Dept: GASTROENTEROLOGY | Facility: CLINIC | Age: 66
End: 2021-01-13

## 2021-01-13 NOTE — TELEPHONE ENCOUNTER
Dr Octavia Vila - Patient called would like to  samples of Viberzi 75 mg samples   Please call patient when ready for  at [phone]

## 2021-01-19 RX ORDER — OFLOXACIN 3 MG/ML
SOLUTION/ DROPS OPHTHALMIC
COMMUNITY
End: 2021-02-23

## 2021-01-19 RX ORDER — A/SINGAPORE/GP1908/2015 IVR-180 (AN A/MICHIGAN/45/2015 (H1N1)PDM09-LIKE VIRUS, A/HONG KONG/4801/2014, NYMC X-263B (H3N2) (AN A/HONG KONG/4801/2014-LIKE VIRUS), AND B/BRISBANE/60/2008, WILD TYPE (A B/BRISBANE/60/2008-LIKE VIRUS) 15; 15; 15 UG/.5ML; UG/.5ML; UG/.5ML
INJECTION, SUSPENSION INTRAMUSCULAR
COMMUNITY

## 2021-01-19 RX ORDER — DIAZEPAM 5 MG/1
TABLET ORAL
COMMUNITY

## 2021-01-19 RX ORDER — LEVOFLOXACIN 750 MG/1
TABLET ORAL
COMMUNITY
End: 2021-02-23

## 2021-01-20 ENCOUNTER — OFFICE VISIT (OUTPATIENT)
Dept: GASTROENTEROLOGY | Facility: CLINIC | Age: 66
End: 2021-01-20
Payer: MEDICARE

## 2021-01-20 VITALS
WEIGHT: 241 LBS | DIASTOLIC BLOOD PRESSURE: 92 MMHG | SYSTOLIC BLOOD PRESSURE: 142 MMHG | HEART RATE: 80 BPM | BODY MASS INDEX: 36.53 KG/M2 | HEIGHT: 68 IN

## 2021-01-20 DIAGNOSIS — K21.9 GASTROESOPHAGEAL REFLUX DISEASE WITHOUT ESOPHAGITIS: ICD-10-CM

## 2021-01-20 DIAGNOSIS — R07.9 CHEST PAIN DUE TO GASTROINTESTINAL REFLUX DISEASE: Primary | ICD-10-CM

## 2021-01-20 DIAGNOSIS — K58.0 IRRITABLE BOWEL SYNDROME WITH DIARRHEA: ICD-10-CM

## 2021-01-20 DIAGNOSIS — K21.9 CHEST PAIN DUE TO GASTROINTESTINAL REFLUX DISEASE: Primary | ICD-10-CM

## 2021-01-20 PROCEDURE — 99213 OFFICE O/P EST LOW 20 MIN: CPT | Performed by: PHYSICIAN ASSISTANT

## 2021-01-20 RX ORDER — ESOMEPRAZOLE MAGNESIUM 40 MG/1
40 CAPSULE, DELAYED RELEASE ORAL
Qty: 60 CAPSULE | Refills: 3 | Status: SHIPPED | OUTPATIENT
Start: 2021-01-20 | End: 2021-04-14

## 2021-01-20 RX ORDER — FAMOTIDINE 40 MG/1
40 TABLET, FILM COATED ORAL DAILY
Qty: 30 TABLET | Refills: 3 | Status: SHIPPED | OUTPATIENT
Start: 2021-01-20 | End: 2021-04-13

## 2021-01-20 RX ORDER — SUCRALFATE 1 G/1
1 TABLET ORAL 4 TIMES DAILY
Qty: 120 TABLET | Refills: 0 | Status: SHIPPED | OUTPATIENT
Start: 2021-01-20 | End: 2021-02-15

## 2021-01-20 NOTE — PATIENT INSTRUCTIONS
Irritable Bowel Syndrome   WHAT YOU NEED TO KNOW:   IBS is a condition that prevents food from moving through your intestines normally  The food may move through too slowly or too quickly  This causes abdominal pain, bloating, increased gas, constipation, or diarrhea  DISCHARGE INSTRUCTIONS:   Return to the emergency department if:   · You have severe abdominal pain  · Your bowel movements are dark or have blood in them  Call your doctor if:   · You have a fever  · You have pain in your rectum  · You are losing weight without trying  · Your abdominal pain does not go away, even after treatment  · You have questions or concerns about your condition or care  Medicines:   · Medicines  help you have a bowel movement, soften your bowel movement, or treat diarrhea  You may also need medicine to relax your muscles  This will decrease abdominal pain and muscles spasms  · Take your medicine as directed  Contact your healthcare provider if you think your medicine is not helping or if you have side effects  Tell him of her if you are allergic to any medicine  Keep a list of the medicines, vitamins, and herbs you take  Include the amounts, and when and why you take them  Bring the list or the pill bottles to follow-up visits  Carry your medicine list with you in case of an emergency  Manage IBS:   · Eat a variety of healthy foods  Healthy foods include fruits, vegetables, whole-grain breads, low-fat dairy products, beans, lean meats, and fish  You may need to avoid certain foods to decrease your symptoms  · Drink liquids as directed  Ask how much liquid to drink each day and which liquids are best for you  For most people, good liquids to drink are water, juice, and milk  · Exercise regularly  Ask about the best exercise plan for you  Exercise can decrease your blood pressure and improve your health  · Keep a record  for 3 weeks  Include everything you eat and drink and your symptoms  Bring this record with you to your follow-up visits  Follow up with your doctor as directed:  Write down your questions so you remember to ask them during your visits  © Copyright [address] Information is for End User's use only and may not be sold, redistributed or otherwise used for commercial purposes  All illustrations and images included in CareNotes® are the copyrighted property of A D A M , Inc  or [facility] Rosangela Rollins   The above information is an  only  It is not intended as medical advice for individual conditions or treatments  Talk to your doctor, nurse or pharmacist before following any medical regimen to see if it is safe and effective for you

## 2021-01-20 NOTE — LETTER
January 20, 2021     Poppy Day MD  [address] [facility] [address]    Patient: Page Cohen   YOB: 1955   Date of Visit: 1/20/2021       Dear Dr Milvia Strickland: Thank you for referring Page Cohen to me for evaluation  Below are my notes for this consultation  If you have questions, please do not hesitate to call me  I look forward to following your patient along with you  Sincerely,        Tameka Alvarez PA-C        CC: No Recipients  Ryan Gonzalez  1/20/2021  4:14 PM  Sign when Signing Visit  [facility] - Outpatient Follow-up Note  Page Cohen 76 y o  female MRN: [id number]  Encounter: [id number]          ASSESSMENT AND PLAN:      1  Chest pain due to gastrointestinal reflux disease  2  Gastroesophageal reflux disease without esophagitis  -Will stop omeprazole and start patient on esomeprazole 40 mg twice a day  -Will start patient on Carafate 4 times a day  -Will start Pepcid q h s  -GERD diet reviewed  3  Irritable bowel syndrome with diarrhea  -Will continue Viberzi 75 mg as needed  ______________________________________________________________________    SUBJECTIVE:    13-year-old female very well known to us with irritable bowel syndrome diarrhea predominance as well as gastroesophageal reflux disease  Patient most recently had upper endoscopy with Dr Henrietta Grayson at [facility] on December 31, 2020  Patient's pathology from that time was completely benign  Patient did have an upper GI series with did show a small hiatal hernia and reflux  Patient reports that on January 13th she had severe chest pain that actually landed her in the emergency department  At that time patient did have a CT scan of her chest that did show esophagitis, hepatic steatosis, enlarged liver    Patient reports she has never experienced chest pain like that in the past   Patient is maintained on omeprazole 20 mg twice a day and she has been for many years  Patient is already failed pantoprazole 40 mg in the past   Patient reports that her acid reflux has been significantly worse before bed and in the morning  Upon further discussion patient does eat some trigger foods throughout the course of the day for acid reflux  Patient also suffers from irritable bowel syndrome diarrhea predominance and she is currently on Viberzi 75 mg as needed  She reports that this regimen is working for her  She has no alarm symptoms  REVIEW OF SYSTEMS IS OTHERWISE NEGATIVE        Historical Information   Past Medical History:   Diagnosis Date    Arthritis     Colon polyp     CPAP (continuous positive airway pressure) dependence     Fibromyalgia, primary     GERD (gastroesophageal reflux disease)     Hypertension     Hypoglycemia     Irritable bowel syndrome     Liver disease     Osteoporosis     Pneumonia     Sleep apnea     Stenosis of cervical spine     and neck     Past Surgical History:   Procedure Laterality Date    CARPAL TUNNEL RELEASE      CATARACT EXTRACTION, BILATERAL      w/ repair of retina tear    CHOLECYSTECTOMY      COLONOSCOPY      GASTRIC BYPASS      HYSTERECTOMY      KNEE SURGERY      LUNG SURGERY      per patient "washing out"    SINUS SURGERY      TONSILLECTOMY       Social History   Social History     Substance and Sexual Activity   Alcohol Use Yes    Frequency: Monthly or less     Social History     Substance and Sexual Activity   Drug Use Not Currently     Social History     Tobacco Use   Smoking Status Former Smoker    Quit date:     Years since quittin 0   Smokeless Tobacco Never Used     Family History   Problem Relation Age of Onset    Diabetes Mother     Cancer Mother         lung cacer    Kidney disease Mother     Osteoarthritis Mother     COPD Mother     Blindness Mother     Glaucoma Mother     Macular degeneration Mother     Kidney disease Father     Heart disease Father    Coleen Shanks Cancer Father         bladder cancer       Meds/Allergies       Current Outpatient Medications:     alendronate (FOSAMAX) 70 mg tablet    alosetron (LOTRONEX) 1 MG tablet    amoxicillin-clavulanate (AUGMENTIN) 875-125 mg per tablet    Ascorbic Acid (JC-C PO)    aspirin 81 MG tablet    atoMOXetine (STRATTERA) 18 mg capsule    atoMOXetine (STRATTERA) 25 mg capsule    atoMOXetine (STRATTERA) 40 mg capsule    brimonidine tartrate 0 2 % ophthalmic solution    busPIRone (BUSPAR) 10 mg tablet    cetirizine (ZyrTEC) 10 mg tablet    Cholecalciferol (VITAMIN D) 125 MCG (5000 UT) CAPS    Cholecalciferol 56460 units capsule    clonazePAM (KlonoPIN) 0 5 mg tablet    cyanocobalamin 1,000 mcg/mL    Cyanocobalamin 1000 MCG/ML KIT    diazepam (VALIUM) 5 mg tablet    diclofenac sodium (VOLTAREN) 1 %    dicyclomine (BENTYL) 20 mg tablet    dorzolamide-timolol (COSOPT) 22 3-6 8 MG/ML ophthalmic solution    DULoxetine (CYMBALTA) 60 mg delayed release capsule    DULoxetine (Cymbalta) 60 mg delayed release capsule    Eluxadoline (Viberzi) 100 MG TABS    EPINEPHrine (EPIPEN 2-PAULINA) 0 3 mg/0 3 mL SOAJ    erythromycin (ILOTYCIN) ophthalmic ointment    estradiol (ESTRACE) 0 1 mg/g vaginal cream    fluticasone (FLONASE) 50 mcg/act nasal spray    folic acid (FOLVITE) 1 mg tablet    gabapentin (NEURONTIN) 300 mg capsule    gabapentin (NEURONTIN) 600 MG tablet    guaifenesin-codeine (CHERATUSSIN AC) 100-10 MG/5ML liquid    hydrochlorothiazide (HYDRODIURIL) 25 mg tablet    Influenza Vac A&B SA Adj Quad (Fluad Quadrivalent) 0 5 ML PRSY    ketoconazole (NIZORAL) 2 % shampoo    lamoTRIgine (LaMICtal) 100 mg tablet    lamoTRIgine (LaMICtal) 200 MG tablet    latanoprost (XALATAN) 0 005 % ophthalmic solution    levofloxacin (LEVAQUIN) 750 mg tablet    losartan (COZAAR) 100 MG tablet    Magnesium 300 MG CAPS    meloxicam (MOBIC) 15 mg tablet    meloxicam (MOBIC) 15 mg tablet    methocarbamol (ROBAXIN) 500 mg tablet    methocarbamol (ROBAXIN) 500 mg tablet    modafinil (PROVIGIL) 100 mg tablet    mometasone (ELOCON) 0 1 % lotion    naproxen (NAPROSYN) 500 mg tablet    ofloxacin (OCUFLOX) 0 3 % ophthalmic solution    omeprazole (PriLOSEC) 20 mg delayed release capsule    omeprazole (PriLOSEC) 20 mg delayed release capsule    oseltamivir (TAMIFLU) 75 mg capsule    polyethylene glycol-electrolytes (NULYTELY) 4000 mL solution    polymyxin b-trimethoprim (POLYTRIM) ophthalmic solution    potassium chloride (K-DUR,KLOR-CON) 10 mEq tablet    prednisoLONE acetate (PRED FORTE) 1 % ophthalmic suspension    predniSONE 10 mg tablet    promethazine-dextromethorphan (PHENERGAN-DM) 6 25-15 mg/5 mL oral syrup    rOPINIRole (REQUIP) 0 25 mg tablet    tetanus-diphtheria-acellular pertussis (BOOSTRIX) injection    timolol (TIMOPTIC) 0 5 % ophthalmic solution    tiZANidine (ZANAFLEX) 4 mg tablet    traMADol (ULTRAM) 50 mg tablet    Zoster Vac Recomb Adjuvanted (SHINGRIX) 50 MCG/0 5ML SUSR    cetirizine (ZYRTEC ALLERGY) 10 mg tablet    cholestyramine (QUESTRAN) 4 g packet    esomeprazole (NexIUM) 40 MG capsule    famotidine (PEPCID) 40 MG tablet    sucralfate (CARAFATE) 1 g tablet    Allergies   Allergen Reactions    Hydrocodone Other (See Comments)     No longer    Medical Tape     Morphine Itching     No longer    Shellfish-Derived Products      Other reaction(s): Unknown    Shrimp Extract Allergy Skin Test      Other reaction(s): Unknown  Other reaction(s): Unknown    Clarithromycin Rash    Wound Dressing Adhesive Rash     Other reaction(s): rash  Other reaction(s): rash           Objective     Blood pressure 142/92, pulse 80, height 5' 8" (1 727 m), weight 109 kg (241 lb)  Body mass index is 36 64 kg/m²        PHYSICAL EXAM:      General Appearance:   Alert, cooperative, no distress   HEENT:   Normocephalic, atraumatic, anicteric      Neck:  Supple, symmetrical, trachea midline   Lungs:   Clear to auscultation bilaterally; no rales, rhonchi or wheezing; respirations unlabored    Heart[de-identified]   Regular rate and rhythm; no murmur, rub, or gallop  Abdomen:   Soft, non-tender, non-distended; normal bowel sounds; no masses, no organomegaly    Genitalia:   Deferred    Rectal:   Deferred    Extremities:  No cyanosis, clubbing or edema    Pulses:  2+ and symmetric    Skin:  No jaundice, rashes, or lesions    Lymph nodes:  No palpable cervical lymphadenopathy        Lab Results:   No visits with results within 1 Day(s) from this visit  Latest known visit with results is:   Hospital Outpatient Visit on 09/13/2019   Component Date Value    POC Glucose 09/13/2019 90     Case Report 09/13/2019                      Value:Surgical Pathology Report                         Case: [id number]                                   Authorizing Provider:  Evelin Lim DO          Collected:           09/13/2019 8470              Ordering Location:      [facility]       Received:            09/13/2019 [address] [place] [address] Endoscopy                                                             Pathologist:           Varsha Ford MD                                                                Specimen:    Colon, ascending and sigmoid                                                               Final Diagnosis 09/13/2019                      Value: This result contains rich text formatting which cannot be displayed here   Note 09/13/2019                      Value: This result contains rich text formatting which cannot be displayed here   Additional Information 09/13/2019                      Value: This result contains rich text formatting which cannot be displayed here  Elizabeth Noe Gross Description 09/13/2019                      Value: This result contains rich text formatting which cannot be displayed here      Clinical Information 09/13/2019                      Value:Functional diarrhea Radiology Results:   No results found

## 2021-01-20 NOTE — PROGRESS NOTES
[facility] - Outpatient Follow-up Note  Sharon Salcedo 76 y o  female MRN: [id number]  Encounter: [id number]          ASSESSMENT AND PLAN:      1  Chest pain due to gastrointestinal reflux disease  2  Gastroesophageal reflux disease without esophagitis  -Will stop omeprazole and start patient on esomeprazole 40 mg twice a day  -Will start patient on Carafate 4 times a day  -Will start Pepcid q h s  -GERD diet reviewed  3  Irritable bowel syndrome with diarrhea  -Will continue Viberzi 75 mg as needed  ______________________________________________________________________    SUBJECTIVE:    79-year-old female very well known to us with irritable bowel syndrome diarrhea predominance as well as gastroesophageal reflux disease  Patient most recently had upper endoscopy with Dr Cornelius Carbajal at [facility] on December 31, 2020  Patient's pathology from that time was completely benign  Patient did have an upper GI series with did show a small hiatal hernia and reflux  Patient reports that on January 13th she had severe chest pain that actually landed her in the emergency department  At that time patient did have a CT scan of her chest that did show esophagitis, hepatic steatosis, enlarged liver  Patient reports she has never experienced chest pain like that in the past   Patient is maintained on omeprazole 20 mg twice a day and she has been for many years  Patient is already failed pantoprazole 40 mg in the past   Patient reports that her acid reflux has been significantly worse before bed and in the morning  Upon further discussion patient does eat some trigger foods throughout the course of the day for acid reflux  Patient also suffers from irritable bowel syndrome diarrhea predominance and she is currently on Viberzi 75 mg as needed  She reports that this regimen is working for her  She has no alarm symptoms      REVIEW OF SYSTEMS IS OTHERWISE NEGATIVE        Historical Information   Past Medical History:   Diagnosis Date    Arthritis     Colon polyp     CPAP (continuous positive airway pressure) dependence     Fibromyalgia, primary     GERD (gastroesophageal reflux disease)     Hypertension     Hypoglycemia     Irritable bowel syndrome     Liver disease     Osteoporosis     Pneumonia     Sleep apnea     Stenosis of cervical spine     and neck     Past Surgical History:   Procedure Laterality Date    CARPAL TUNNEL RELEASE      CATARACT EXTRACTION, BILATERAL      w/ repair of retina tear    CHOLECYSTECTOMY      COLONOSCOPY      GASTRIC BYPASS      HYSTERECTOMY      KNEE SURGERY      LUNG SURGERY      per patient "washing out"    SINUS SURGERY      TONSILLECTOMY       Social History   Social History     Substance and Sexual Activity   Alcohol Use Yes    Frequency: Monthly or less     Social History     Substance and Sexual Activity   Drug Use Not Currently     Social History     Tobacco Use   Smoking Status Former Smoker    Quit date:     Years since quittin 0   Smokeless Tobacco Never Used     Family History   Problem Relation Age of Onset    Diabetes Mother     Cancer Mother         lung cacer    Kidney disease Mother     Osteoarthritis Mother     COPD Mother     Blindness Mother     Glaucoma Mother     Macular degeneration Mother     Kidney disease Father     Heart disease Father     Cancer Father         bladder cancer       Meds/Allergies       Current Outpatient Medications:     alendronate (FOSAMAX) 70 mg tablet    alosetron (LOTRONEX) 1 MG tablet    amoxicillin-clavulanate (AUGMENTIN) 875-125 mg per tablet    Ascorbic Acid (JC-C PO)    aspirin 81 MG tablet    atoMOXetine (STRATTERA) 18 mg capsule    atoMOXetine (STRATTERA) 25 mg capsule    atoMOXetine (STRATTERA) 40 mg capsule    brimonidine tartrate 0 2 % ophthalmic solution    busPIRone (BUSPAR) 10 mg tablet    cetirizine (ZyrTEC) 10 mg tablet    Cholecalciferol (VITAMIN D) 125 MCG (5000 UT) CAPS    Cholecalciferol 55833 units capsule    clonazePAM (KlonoPIN) 0 5 mg tablet    cyanocobalamin 1,000 mcg/mL    Cyanocobalamin 1000 MCG/ML KIT    diazepam (VALIUM) 5 mg tablet    diclofenac sodium (VOLTAREN) 1 %    dicyclomine (BENTYL) 20 mg tablet    dorzolamide-timolol (COSOPT) 22 3-6 8 MG/ML ophthalmic solution    DULoxetine (CYMBALTA) 60 mg delayed release capsule    DULoxetine (Cymbalta) 60 mg delayed release capsule    Eluxadoline (Viberzi) 100 MG TABS    EPINEPHrine (EPIPEN 2-PAULINA) 0 3 mg/0 3 mL SOAJ    erythromycin (ILOTYCIN) ophthalmic ointment    estradiol (ESTRACE) 0 1 mg/g vaginal cream    fluticasone (FLONASE) 50 mcg/act nasal spray    folic acid (FOLVITE) 1 mg tablet    gabapentin (NEURONTIN) 300 mg capsule    gabapentin (NEURONTIN) 600 MG tablet    guaifenesin-codeine (CHERATUSSIN AC) 100-10 MG/5ML liquid    hydrochlorothiazide (HYDRODIURIL) 25 mg tablet    Influenza Vac A&B SA Adj Quad (Fluad Quadrivalent) 0 5 ML PRSY    ketoconazole (NIZORAL) 2 % shampoo    lamoTRIgine (LaMICtal) 100 mg tablet    lamoTRIgine (LaMICtal) 200 MG tablet    latanoprost (XALATAN) 0 005 % ophthalmic solution    levofloxacin (LEVAQUIN) 750 mg tablet    losartan (COZAAR) 100 MG tablet    Magnesium 300 MG CAPS    meloxicam (MOBIC) 15 mg tablet    meloxicam (MOBIC) 15 mg tablet    methocarbamol (ROBAXIN) 500 mg tablet    methocarbamol (ROBAXIN) 500 mg tablet    modafinil (PROVIGIL) 100 mg tablet    mometasone (ELOCON) 0 1 % lotion    naproxen (NAPROSYN) 500 mg tablet    ofloxacin (OCUFLOX) 0 3 % ophthalmic solution    omeprazole (PriLOSEC) 20 mg delayed release capsule    omeprazole (PriLOSEC) 20 mg delayed release capsule    oseltamivir (TAMIFLU) 75 mg capsule    polyethylene glycol-electrolytes (NULYTELY) 4000 mL solution    polymyxin b-trimethoprim (POLYTRIM) ophthalmic solution    potassium chloride (K-DUR,KLOR-CON) 10 mEq tablet    prednisoLONE acetate (PRED FORTE) 1 % ophthalmic suspension    predniSONE 10 mg tablet    promethazine-dextromethorphan (PHENERGAN-DM) 6 25-15 mg/5 mL oral syrup    rOPINIRole (REQUIP) 0 25 mg tablet    tetanus-diphtheria-acellular pertussis (BOOSTRIX) injection    timolol (TIMOPTIC) 0 5 % ophthalmic solution    tiZANidine (ZANAFLEX) 4 mg tablet    traMADol (ULTRAM) 50 mg tablet    Zoster Vac Recomb Adjuvanted (SHINGRIX) 50 MCG/0 5ML SUSR    cetirizine (ZYRTEC ALLERGY) 10 mg tablet    cholestyramine (QUESTRAN) 4 g packet    esomeprazole (NexIUM) 40 MG capsule    famotidine (PEPCID) 40 MG tablet    sucralfate (CARAFATE) 1 g tablet    Allergies   Allergen Reactions    Hydrocodone Other (See Comments)     No longer    Medical Tape     Morphine Itching     No longer    Shellfish-Derived Products      Other reaction(s): Unknown    Shrimp Extract Allergy Skin Test      Other reaction(s): Unknown  Other reaction(s): Unknown    Clarithromycin Rash    Wound Dressing Adhesive Rash     Other reaction(s): rash  Other reaction(s): rash           Objective     Blood pressure 142/92, pulse 80, height 5' 8" (1 727 m), weight 109 kg (241 lb)  Body mass index is 36 64 kg/m²  PHYSICAL EXAM:      General Appearance:   Alert, cooperative, no distress   HEENT:   Normocephalic, atraumatic, anicteric      Neck:  Supple, symmetrical, trachea midline   Lungs:   Clear to auscultation bilaterally; no rales, rhonchi or wheezing; respirations unlabored    Heart[de-identified]   Regular rate and rhythm; no murmur, rub, or gallop  Abdomen:   Soft, non-tender, non-distended; normal bowel sounds; no masses, no organomegaly    Genitalia:   Deferred    Rectal:   Deferred    Extremities:  No cyanosis, clubbing or edema    Pulses:  2+ and symmetric    Skin:  No jaundice, rashes, or lesions    Lymph nodes:  No palpable cervical lymphadenopathy        Lab Results:   No visits with results within 1 Day(s) from this visit  Latest known visit with results is:   Hospital Outpatient Visit on 09/13/2019   Component Date Value    POC Glucose 09/13/2019 90     Case Report 09/13/2019                      Value:Surgical Pathology Report                         Case: [id number]                                   Authorizing Provider:  Theresa Gallo DO          Collected:           09/13/2019 [id number]              Ordering Location[facility]      [facility]       Received:            09/13/2019 [address] [place] [address] Endoscopy                                                             Pathologist:           Kylie Chapman MD                                                                Specimen:    Colon, ascending and sigmoid                                                               Final Diagnosis 09/13/2019                      Value: This result contains rich text formatting which cannot be displayed here   Note 09/13/2019                      Value: This result contains rich text formatting which cannot be displayed here   Additional Information 09/13/2019                      Value: This result contains rich text formatting which cannot be displayed here  [employer] Gross Description 09/13/2019                      Value: This result contains rich text formatting which cannot be displayed here   Clinical Information 09/13/2019                      Value:Functional diarrhea         Radiology Results:   No results found

## 2021-02-13 DIAGNOSIS — K21.9 CHEST PAIN DUE TO GASTROINTESTINAL REFLUX DISEASE: ICD-10-CM

## 2021-02-13 DIAGNOSIS — R07.9 CHEST PAIN DUE TO GASTROINTESTINAL REFLUX DISEASE: ICD-10-CM

## 2021-02-13 DIAGNOSIS — K21.9 GASTROESOPHAGEAL REFLUX DISEASE WITHOUT ESOPHAGITIS: ICD-10-CM

## 2021-02-15 RX ORDER — SUCRALFATE 1 G/1
TABLET ORAL
Qty: 120 TABLET | Refills: 0 | Status: SHIPPED | OUTPATIENT
Start: 2021-02-15 | End: 2021-03-15

## 2021-02-23 ENCOUNTER — OFFICE VISIT (OUTPATIENT)
Dept: GASTROENTEROLOGY | Facility: CLINIC | Age: 66
End: 2021-02-23
Payer: MEDICARE

## 2021-02-23 VITALS
SYSTOLIC BLOOD PRESSURE: 118 MMHG | HEART RATE: 74 BPM | HEIGHT: 68 IN | WEIGHT: 240 LBS | DIASTOLIC BLOOD PRESSURE: 82 MMHG | BODY MASS INDEX: 36.37 KG/M2

## 2021-02-23 DIAGNOSIS — K58.0 IRRITABLE BOWEL SYNDROME WITH DIARRHEA: ICD-10-CM

## 2021-02-23 DIAGNOSIS — K21.9 GASTROESOPHAGEAL REFLUX DISEASE WITHOUT ESOPHAGITIS: ICD-10-CM

## 2021-02-23 DIAGNOSIS — K21.9 CHEST PAIN DUE TO GASTROINTESTINAL REFLUX DISEASE: Primary | ICD-10-CM

## 2021-02-23 DIAGNOSIS — R07.9 CHEST PAIN DUE TO GASTROINTESTINAL REFLUX DISEASE: Primary | ICD-10-CM

## 2021-02-23 PROCEDURE — 99213 OFFICE O/P EST LOW 20 MIN: CPT | Performed by: PHYSICIAN ASSISTANT

## 2021-02-23 NOTE — PROGRESS NOTES
Answers for HPI/ROS submitted by the patient on 2/22/2021   Abdominal pain  Chronicity: chronic  Onset: more than 1 year ago  Onset quality: undetermined  Frequency: daily  Episode duration: 6 hours  Progression since onset: waxing and waning  Pain location: generalized abdominal region  Pain - numeric: 5/10  Pain quality: cramping  Radiates to: chest, left flank  anorexia: No  constipation: Yes  diarrhea: Yes  dysuria: No  fever: No  flatus: Yes  frequency: No  nausea: Yes  weight loss: No  vomiting: No  Relieved by: bowel movements, passing flatus  Diagnostic workup: CT scan, GI consult, upper endoscopy  [facility]ese [facility] - Outpatient Follow-up Note  Evi Luna 76 y o  female MRN: [id number]  Encounter: [id number]          ASSESSMENT AND PLAN:      1  Chest pain due to gastrointestinal reflux disease  2  Gastroesophageal reflux disease without esophagitis  3  Irritable bowel syndrome with diarrhea  -Continue esomeprazole 40 mg twice a day  -Continue Pepcid q h s  Francella Crooked -Continue Bentyl 20 mg as needed     -Patient stop Carafate for now     -No plans for repeat endoscopy at this time     -Will continue Viberzi 75 mg for IBS D  Follow up in 8 weeks  ______________________________________________________________________    SUBJECTIVE:    70-year-old female who is here for follow-up of acid reflux disease as well as irritable bowel syndrome with diarrhea predominance  Patient reports she is about 20% improved since the last appointment  At her last appointment we started esomeprazole 40 mg twice a day, Pepcid q h s , Carafate 4 times a day, Bentyl 20 mg as needed  Patient was also maintained on Viberzi 75 mg every several days for irritable bowel syndrome with diarrhea predominance  Patient is still reporting occasional pain in her epigastrium  Patient reports burning in her chest as well as regurgitation episodes  She reports these are improved    She denies any alarm symptoms  She reports she is really trying to watch her dietary triggers  Patient's last upper endoscopy was in 2020 at [address] [facility]        Historical Information   Past Medical History:   Diagnosis Date    Arthritis     Colon polyp     CPAP (continuous positive airway pressure) dependence     Fibromyalgia, primary     GERD (gastroesophageal reflux disease)     Hypertension     Hypoglycemia     Irritable bowel syndrome     Liver disease     Osteoporosis     Pneumonia     Sleep apnea     Stenosis of cervical spine     and neck     Past Surgical History:   Procedure Laterality Date    CARPAL TUNNEL RELEASE      CATARACT EXTRACTION, BILATERAL      w/ repair of retina tear    CHOLECYSTECTOMY      COLONOSCOPY      GASTRIC BYPASS      HYSTERECTOMY      KNEE SURGERY      LUNG SURGERY      per patient "washing out"    SINUS SURGERY      TONSILLECTOMY       Social History   Social History     Substance and Sexual Activity   Alcohol Use Yes    Frequency: Monthly or less     Social History     Substance and Sexual Activity   Drug Use Not Currently     Social History     Tobacco Use   Smoking Status Former Smoker    Quit date:     Years since quittin 1   Smokeless Tobacco Never Used     Family History   Problem Relation Age of Onset    Diabetes Mother     Cancer Mother         lung cacer    Kidney disease Mother     Osteoarthritis Mother     COPD Mother     Blindness Mother     Glaucoma Mother     Macular degeneration Mother     Kidney disease Father     Heart disease Father     Cancer Father         bladder cancer       Meds/Allergies       Current Outpatient Medications:     alosetron (LOTRONEX) 1 MG tablet    Ascorbic Acid (JC-C PO)    aspirin 81 MG tablet    atoMOXetine (STRATTERA) 40 mg capsule    busPIRone (BUSPAR) 10 mg tablet    Cholecalciferol (VITAMIN D) 125 MCG (5000 UT) CAPS    clonazePAM (KlonoPIN) 0 5 mg tablet    Cyanocobalamin 1000 MCG/ML KIT    diazepam (VALIUM) 5 mg tablet    diclofenac sodium (VOLTAREN) 1 %    dicyclomine (BENTYL) 20 mg tablet    dorzolamide-timolol (COSOPT) 22 3-6 8 MG/ML ophthalmic solution    DULoxetine (Cymbalta) 60 mg delayed release capsule    Eluxadoline (Viberzi) 100 MG TABS    esomeprazole (NexIUM) 40 MG capsule    famotidine (PEPCID) 40 MG tablet    fluticasone (FLONASE) 50 mcg/act nasal spray    folic acid (FOLVITE) 1 mg tablet    gabapentin (NEURONTIN) 600 MG tablet    guaifenesin-codeine (CHERATUSSIN AC) 100-10 MG/5ML liquid    hydrochlorothiazide (HYDRODIURIL) 25 mg tablet    Influenza Vac A&B SA Adj Quad (Fluad Quadrivalent) 0 5 ML PRSY    ketoconazole (NIZORAL) 2 % shampoo    lamoTRIgine (LaMICtal) 200 MG tablet    losartan (COZAAR) 100 MG tablet    Magnesium 300 MG CAPS    meloxicam (MOBIC) 15 mg tablet    methocarbamol (ROBAXIN) 500 mg tablet    modafinil (PROVIGIL) 100 mg tablet    mometasone (ELOCON) 0 1 % lotion    potassium chloride (K-DUR,KLOR-CON) 10 mEq tablet    promethazine-dextromethorphan (PHENERGAN-DM) 6 25-15 mg/5 mL oral syrup    rOPINIRole (REQUIP) 0 25 mg tablet    sucralfate (CARAFATE) 1 g tablet    tetanus-diphtheria-acellular pertussis (BOOSTRIX) injection    timolol (TIMOPTIC) 0 5 % ophthalmic solution    tiZANidine (ZANAFLEX) 4 mg tablet    Zoster Vac Recomb Adjuvanted (SHINGRIX) 50 MCG/0 5ML SUSR    cetirizine (ZYRTEC ALLERGY) 10 mg tablet    Allergies   Allergen Reactions    Shellfish-Derived Products Anaphylaxis     Other reaction(s): Unknown    Wound Dressing Adhesive Rash     Other reaction(s): rash  Other reaction(s): rash    Hydrocodone Other (See Comments)     No longer    Medical Tape     Clarithromycin Rash           Objective     Blood pressure 118/82, pulse 74, height 5' 8" (1 727 m), weight 109 kg (240 lb)  Body mass index is 36 49 kg/m²        PHYSICAL EXAM:      General Appearance:   Alert, cooperative, no distress   HEENT:   Normocephalic, atraumatic, anicteric      Neck:  Supple, symmetrical, trachea midline   Lungs:   Clear to auscultation bilaterally; no rales, rhonchi or wheezing; respirations unlabored    Heart[de-identified]   Regular rate and rhythm; no murmur, rub, or gallop  Abdomen:   Soft, non-tender, non-distended; normal bowel sounds; no masses, no organomegaly    Genitalia:   Deferred    Rectal:   Deferred    Extremities:  No cyanosis, clubbing or edema    Pulses:  2+ and symmetric    Skin:  No jaundice, rashes, or lesions    Lymph nodes:  No palpable cervical lymphadenopathy        Lab Results:   No visits with results within 1 Day(s) from this visit  Latest known visit with results is:   Hospital Outpatient Visit on 09/13/2019   Component Date Value    POC Glucose 09/13/2019 90     Case Report 09/13/2019                      Value:Surgical Pathology Report                         Case: [id number]                                   Authorizing Provider:  Deborah Sutton DO          Collected:           09/13/2019 8053              Ordering Location[facility]      [facility]       Received:            09/13/2019 [facility] Endoscopy                                                             Pathologist:           Zeb Medina MD                                                                Specimen:    Colon, ascending and sigmoid                                                               Final Diagnosis 09/13/2019                      Value: This result contains rich text formatting which cannot be displayed here   Note 09/13/2019                      Value: This result contains rich text formatting which cannot be displayed here   Additional Information 09/13/2019                      Value: This result contains rich text formatting which cannot be displayed here  [facility] Gross Description 09/13/2019                      Value: This result contains rich text formatting which cannot be displayed here   Clinical Information 09/13/2019                      Value:Functional diarrhea         Radiology Results:   No results found

## 2021-02-23 NOTE — PATIENT INSTRUCTIONS
Gastroesophageal Reflux Disease   WHAT YOU NEED TO KNOW:   Gastroesophageal reflux disease (GERD) is reflux that occurs more than twice a week for a few weeks  Reflux means acid and food in the stomach back up into the esophagus  It usually causes heartburn and other symptoms  GERD can cause other health problems over time if it is not treated  DISCHARGE INSTRUCTIONS:   Call your local emergency number (911 in the [address] [facility]) if:   · You have severe chest pain and sudden trouble breathing  Return to the emergency department if:   · You have trouble breathing after you vomit  · You have trouble swallowing, or pain with swallowing  · Your bowel movements are black, bloody, or tarry-looking  · Your vomit looks like coffee grounds or has blood in it  Call your doctor or gastroenterologist if:   · You feel full and cannot burp or vomit  · You vomit large amounts, or you vomit often  · You are losing weight without trying  · Your symptoms get worse or do not improve with treatment  · You have questions or concerns about your condition or care  Medicines:   · Medicines  are used to decrease stomach acid  Medicine may also be used to help your lower esophageal sphincter and stomach contract (tighten) more  · Take your medicine as directed  Contact your healthcare provider if you think your medicine is not helping or if you have side effects  Tell him of her if you are allergic to any medicine  Keep a list of the medicines, vitamins, and herbs you take  Include the amounts, and when and why you take them  Bring the list or the pill bottles to follow-up visits  Carry your medicine list with you in case of an emergency  Manage GERD:   · Do not have foods or drinks that may increase heartburn  These include chocolate, peppermint, fried or fatty foods, drinks that contain caffeine, or carbonated drinks (soda)  Other foods include spicy foods, onions, tomatoes, and tomato-based foods   Do not have foods or drinks that can irritate your esophagus, such as citrus fruits, juices, and alcohol  · Do not eat large meals  When you eat a lot of food at one time, your stomach needs more acid to digest it  Eat 6 small meals each day instead of 3 large ones, and eat slowly  Do not eat meals 2 to 3 hours before bedtime  · Elevate the head of your bed  Place 6-inch blocks under the head of your bed frame  You may also use more than one pillow under your head and shoulders while you sleep  · Maintain a healthy weight  If you are overweight, weight loss may help relieve symptoms of GERD  · Do not smoke  Smoking weakens the lower esophageal sphincter and increases the risk of GERD  Ask your healthcare provider for information if you currently smoke and need help to quit  E-cigarettes or smokeless tobacco still contain nicotine  Talk to your healthcare provider before you use these products  · Do not wear clothing that is tight around your waist   Tight clothing can put pressure on your stomach and cause or worsen GERD symptoms  Follow up with your doctor or gastroenterologist as directed:  Write down your questions so you remember to ask them during your visits  © Copyright [address] [place] [address] Drive Information is for End User's use only and may not be sold, redistributed or otherwise used for commercial purposes  All illustrations and images included in CareNotes® are the copyrighted property of A D A M , Inc  or [facility] Rosangela Rollins   The above information is an  only  It is not intended as medical advice for individual conditions or treatments  Talk to your doctor, nurse or pharmacist before following any medical regimen to see if it is safe and effective for you

## 2021-02-23 NOTE — LETTER
February 23, 2021     Radha Spangler MD  [address] [facility] [address]    Patient: Genny Cade   YOB: 1955   Date of Visit: 2/23/2021       Dear Dr Deana Christianson: Thank you for referring Genny Cade to me for evaluation  Below are my notes for this consultation  If you have questions, please do not hesitate to call me  I look forward to following your patient along with you  Sincerely,        Kenney Velazquez PA-C        CC: No Recipients  Elana Garcia  2/23/2021  4:13 PM  Sign when Signing Visit  Answers for HPI/ROS submitted by the patient on 2/22/2021   Abdominal pain  Chronicity: chronic  Onset: more than 1 year ago  Onset quality: undetermined  Frequency: daily  Episode duration: 6 hours  Progression since onset: waxing and waning  Pain location: generalized abdominal region  Pain - numeric: 5/10  Pain quality: cramping  Radiates to: chest, left flank  anorexia: No  constipation: Yes  diarrhea: Yes  dysuria: No  fever: No  flatus: Yes  frequency: No  nausea: Yes  weight loss: No  vomiting: No  Relieved by: bowel movements, passing flatus  Diagnostic workup: CT scan, GI consult, upper endoscopy  [facility] - Outpatient Follow-up Note  Genny Cade 76 y o  female MRN: [id number]  Encounter: [id number]          ASSESSMENT AND PLAN:      1  Chest pain due to gastrointestinal reflux disease  2  Gastroesophageal reflux disease without esophagitis  3  Irritable bowel syndrome with diarrhea  -Continue esomeprazole 40 mg twice a day  -Continue Pepcid q h s  Audrea Ramming -Continue Bentyl 20 mg as needed     -Patient stop Carafate for now     -No plans for repeat endoscopy at this time     -Will continue Viberzi 75 mg for IBS D  Follow up in 8 weeks     ______________________________________________________________________    SUBJECTIVE:    79-year-old female who is here for follow-up of acid reflux disease as well as irritable bowel syndrome with diarrhea predominance  Patient reports she is about 20% improved since the last appointment  At her last appointment we started esomeprazole 40 mg twice a day, Pepcid q h s , Carafate 4 times a day, Bentyl 20 mg as needed  Patient was also maintained on Viberzi 75 mg every several days for irritable bowel syndrome with diarrhea predominance  Patient is still reporting occasional pain in her epigastrium  Patient reports burning in her chest as well as regurgitation episodes  She reports these are improved  She denies any alarm symptoms  She reports she is really trying to watch her dietary triggers  Patient's last upper endoscopy was in 2020 at [address] [place] [address]        Historical Information   Past Medical History:   Diagnosis Date    Arthritis     Colon polyp     CPAP (continuous positive airway pressure) dependence     Fibromyalgia, primary     GERD (gastroesophageal reflux disease)     Hypertension     Hypoglycemia     Irritable bowel syndrome     Liver disease     Osteoporosis     Pneumonia     Sleep apnea     Stenosis of cervical spine     and neck     Past Surgical History:   Procedure Laterality Date    CARPAL TUNNEL RELEASE      CATARACT EXTRACTION, BILATERAL      w/ repair of retina tear    CHOLECYSTECTOMY      COLONOSCOPY      GASTRIC BYPASS      HYSTERECTOMY      KNEE SURGERY      LUNG SURGERY      per patient "washing out"    SINUS SURGERY      TONSILLECTOMY       Social History   Social History     Substance and Sexual Activity   Alcohol Use Yes    Frequency: Monthly or less     Social History     Substance and Sexual Activity   Drug Use Not Currently     Social History     Tobacco Use   Smoking Status Former Smoker    Quit date: 45    Years since quittin 1   Smokeless Tobacco Never Used     Family History   Problem Relation Age of Onset    Diabetes Mother     Cancer Mother         lung cacer  Kidney disease Mother     Osteoarthritis Mother     COPD Mother     Blindness Mother     Glaucoma Mother     Macular degeneration Mother     Kidney disease Father     Heart disease Father     Cancer Father         bladder cancer       Meds/Allergies       Current Outpatient Medications:     alosetron (LOTRONEX) 1 MG tablet    Ascorbic Acid (JC-C PO)    aspirin 81 MG tablet    atoMOXetine (STRATTERA) 40 mg capsule    busPIRone (BUSPAR) 10 mg tablet    Cholecalciferol (VITAMIN D) 125 MCG (5000 UT) CAPS    clonazePAM (KlonoPIN) 0 5 mg tablet    Cyanocobalamin 1000 MCG/ML KIT    diazepam (VALIUM) 5 mg tablet    diclofenac sodium (VOLTAREN) 1 %    dicyclomine (BENTYL) 20 mg tablet    dorzolamide-timolol (COSOPT) 22 3-6 8 MG/ML ophthalmic solution    DULoxetine (Cymbalta) 60 mg delayed release capsule    Eluxadoline (Viberzi) 100 MG TABS    esomeprazole (NexIUM) 40 MG capsule    famotidine (PEPCID) 40 MG tablet    fluticasone (FLONASE) 50 mcg/act nasal spray    folic acid (FOLVITE) 1 mg tablet    gabapentin (NEURONTIN) 600 MG tablet    guaifenesin-codeine (CHERATUSSIN AC) 100-10 MG/5ML liquid    hydrochlorothiazide (HYDRODIURIL) 25 mg tablet    Influenza Vac A&B SA Adj Quad (Fluad Quadrivalent) 0 5 ML PRSY    ketoconazole (NIZORAL) 2 % shampoo    lamoTRIgine (LaMICtal) 200 MG tablet    losartan (COZAAR) 100 MG tablet    Magnesium 300 MG CAPS    meloxicam (MOBIC) 15 mg tablet    methocarbamol (ROBAXIN) 500 mg tablet    modafinil (PROVIGIL) 100 mg tablet    mometasone (ELOCON) 0 1 % lotion    potassium chloride (K-DUR,KLOR-CON) 10 mEq tablet    promethazine-dextromethorphan (PHENERGAN-DM) 6 25-15 mg/5 mL oral syrup    rOPINIRole (REQUIP) 0 25 mg tablet    sucralfate (CARAFATE) 1 g tablet    tetanus-diphtheria-acellular pertussis (BOOSTRIX) injection    timolol (TIMOPTIC) 0 5 % ophthalmic solution    tiZANidine (ZANAFLEX) 4 mg tablet    Zoster Vac Recomb Adjuvanted [facility]) 50 MCG/0 5ML SUSR    cetirizine (ZYRTEC ALLERGY) 10 mg tablet    Allergies   Allergen Reactions    Shellfish-Derived Products Anaphylaxis     Other reaction(s): Unknown    Wound Dressing Adhesive Rash     Other reaction(s): rash  Other reaction(s): rash    Hydrocodone Other (See Comments)     No longer    Medical Tape     Clarithromycin Rash           Objective     Blood pressure 118/82, pulse 74, height 5' 8" (1 727 m), weight 109 kg (240 lb)  Body mass index is 36 49 kg/m²  PHYSICAL EXAM:      General Appearance:   Alert, cooperative, no distress   HEENT:   Normocephalic, atraumatic, anicteric      Neck:  Supple, symmetrical, trachea midline   Lungs:   Clear to auscultation bilaterally; no rales, rhonchi or wheezing; respirations unlabored    Heart[de-identified]   Regular rate and rhythm; no murmur, rub, or gallop  Abdomen:   Soft, non-tender, non-distended; normal bowel sounds; no masses, no organomegaly    Genitalia:   Deferred    Rectal:   Deferred    Extremities:  No cyanosis, clubbing or edema    Pulses:  2+ and symmetric    Skin:  No jaundice, rashes, or lesions    Lymph nodes:  No palpable cervical lymphadenopathy        Lab Results:   No visits with results within 1 Day(s) from this visit     Latest known visit with results is:   Hospital Outpatient Visit on 09/13/2019   Component Date Value    POC Glucose 09/13/2019 90     Case Report 09/13/2019                      Value:Surgical Pathology Report                         Case: [id number]                                   Authorizing Provider:  Nolan Traore DO          Collected:           09/13/2019 2570              Ordering Location[facility]      [facility]       Received:            09/13/2019 [address] [place] [address] Endoscopy                                                             Pathologist:           Ladi Antoine MD                                                                Specimen:    Colon, ascending and sigmoid                                                               Final Diagnosis 09/13/2019                      Value: This result contains rich text formatting which cannot be displayed here   Note 09/13/2019                      Value: This result contains rich text formatting which cannot be displayed here   Additional Information 09/13/2019                      Value: This result contains rich text formatting which cannot be displayed here  [facility] Gross Description 09/13/2019                      Value: This result contains rich text formatting which cannot be displayed here   Clinical Information 09/13/2019                      Value:Functional diarrhea         Radiology Results:   No results found

## 2021-03-10 DIAGNOSIS — Z23 ENCOUNTER FOR IMMUNIZATION: ICD-10-CM

## 2021-03-14 DIAGNOSIS — R07.9 CHEST PAIN DUE TO GASTROINTESTINAL REFLUX DISEASE: ICD-10-CM

## 2021-03-14 DIAGNOSIS — K21.9 CHEST PAIN DUE TO GASTROINTESTINAL REFLUX DISEASE: ICD-10-CM

## 2021-03-14 DIAGNOSIS — K21.9 GASTROESOPHAGEAL REFLUX DISEASE WITHOUT ESOPHAGITIS: ICD-10-CM

## 2021-03-15 RX ORDER — SUCRALFATE 1 G/1
TABLET ORAL
Qty: 120 TABLET | Refills: 0 | Status: SHIPPED | OUTPATIENT
Start: 2021-03-15

## 2021-04-13 DIAGNOSIS — K21.9 GASTROESOPHAGEAL REFLUX DISEASE WITHOUT ESOPHAGITIS: ICD-10-CM

## 2021-04-13 DIAGNOSIS — K21.9 CHEST PAIN DUE TO GASTROINTESTINAL REFLUX DISEASE: ICD-10-CM

## 2021-04-13 DIAGNOSIS — R07.9 CHEST PAIN DUE TO GASTROINTESTINAL REFLUX DISEASE: ICD-10-CM

## 2021-04-13 RX ORDER — FAMOTIDINE 40 MG/1
TABLET, FILM COATED ORAL
Qty: 90 TABLET | Refills: 1 | Status: SHIPPED | OUTPATIENT
Start: 2021-04-13 | End: 2021-09-27

## 2021-04-14 DIAGNOSIS — R07.9 CHEST PAIN DUE TO GASTROINTESTINAL REFLUX DISEASE: ICD-10-CM

## 2021-04-14 DIAGNOSIS — K21.9 GASTROESOPHAGEAL REFLUX DISEASE WITHOUT ESOPHAGITIS: ICD-10-CM

## 2021-04-14 DIAGNOSIS — K21.9 CHEST PAIN DUE TO GASTROINTESTINAL REFLUX DISEASE: ICD-10-CM

## 2021-04-14 RX ORDER — ESOMEPRAZOLE MAGNESIUM 40 MG/1
40 CAPSULE, DELAYED RELEASE ORAL
Qty: 180 CAPSULE | Refills: 1 | Status: SHIPPED | OUTPATIENT
Start: 2021-04-14 | End: 2021-11-16 | Stop reason: SDUPTHER

## 2021-04-20 ENCOUNTER — OFFICE VISIT (OUTPATIENT)
Dept: GASTROENTEROLOGY | Facility: CLINIC | Age: 66
End: 2021-04-20
Payer: MEDICARE

## 2021-04-20 VITALS
HEIGHT: 68 IN | BODY MASS INDEX: 36.37 KG/M2 | SYSTOLIC BLOOD PRESSURE: 138 MMHG | HEART RATE: 86 BPM | WEIGHT: 240 LBS | DIASTOLIC BLOOD PRESSURE: 80 MMHG

## 2021-04-20 DIAGNOSIS — K21.9 GASTROESOPHAGEAL REFLUX DISEASE WITHOUT ESOPHAGITIS: Primary | ICD-10-CM

## 2021-04-20 DIAGNOSIS — K58.0 IRRITABLE BOWEL SYNDROME WITH DIARRHEA: ICD-10-CM

## 2021-04-20 PROCEDURE — 99213 OFFICE O/P EST LOW 20 MIN: CPT | Performed by: PHYSICIAN ASSISTANT

## 2021-04-20 NOTE — LETTER
April 20, 2021     Maria Alejandra Wright MD  [address] [facility]    Patient: Rebecca Calero   YOB: 1955   Date of Visit: 4/20/2021       Dear Dr Debi Holter: Thank you for referring Rebecca Calero to me for evaluation  Below are my notes for this consultation  If you have questions, please do not hesitate to call me  I look forward to following your patient along with you  Sincerely,        Nell Zuñiga PA-C        CC: No Recipients  Elana Martin  4/20/2021 12:02 PM  Sign when Signing Visit  [facility] - Outpatient Follow-up Note  Rebecca Calero 76 y o  female MRN: [id number]  Encounter: [id number]          ASSESSMENT AND PLAN:      1  Gastroesophageal reflux disease without esophagitis  2  Irritable bowel syndrome with diarrhea  -Will continue esomeprazole 40 mg twice a day  -Will continue Pepcid q h s  [place] -Patient cannot eat or drink 2 hours before lying down     -Encourage patient to really focus on dietary modifications and the timing of her meals     -As for her irritable bowel syndrome will encourage her to keep taking Viberzi 75 mg every other day   ______________________________________________________________________    SUBJECTIVE:   69-year-old female who presents today for follow-up of gastroesophageal reflux disease well as irritable bowel syndrome  Patient reports that she is still suffering with on and off issues of regurgitation and heartburn  Patient does report that this is her own fall due to her dietary choices  Patient does report that the [place] that she was taking also seem to make her little bit more constipated  But she does report when she goes through flare-up of diarrhea she is also now having fecal leakage  She denies any alarm symptoms  She reports she cannot exercise due to the fact that she has a bad knee and shoulder right now    She reports she is really struggling with her weight again as well as dietary choices  REVIEW OF SYSTEMS IS OTHERWISE NEGATIVE        Historical Information   Past Medical History:   Diagnosis Date    Arthritis     Colon polyp     CPAP (continuous positive airway pressure) dependence     Fibromyalgia, primary     GERD (gastroesophageal reflux disease)     Hypertension     Hypoglycemia     Irritable bowel syndrome     Liver disease     Osteoporosis     Pneumonia     Sleep apnea     Stenosis of cervical spine     and neck     Past Surgical History:   Procedure Laterality Date    CARPAL TUNNEL RELEASE      CATARACT EXTRACTION, BILATERAL      w/ repair of retina tear    CHOLECYSTECTOMY      COLONOSCOPY      GASTRIC BYPASS      HYSTERECTOMY      KNEE SURGERY      LUNG SURGERY      per patient "washing out"    SINUS SURGERY      TONSILLECTOMY       Social History   Social History     Substance and Sexual Activity   Alcohol Use Yes    Frequency: Monthly or less     Social History     Substance and Sexual Activity   Drug Use Not Currently     Social History     Tobacco Use   Smoking Status Former Smoker    Quit date:     Years since quittin 3   Smokeless Tobacco Never Used     Family History   Problem Relation Age of Onset    Diabetes Mother     Cancer Mother         lung cacer    Kidney disease Mother     Osteoarthritis Mother     COPD Mother     Blindness Mother     Glaucoma Mother     Macular degeneration Mother     Kidney disease Father     Heart disease Father     Cancer Father         bladder cancer       Meds/Allergies       Current Outpatient Medications:     alosetron (LOTRONEX) 1 MG tablet    Ascorbic Acid (CJ-C PO)    aspirin 81 MG tablet    atoMOXetine (STRATTERA) 40 mg capsule    busPIRone (BUSPAR) 10 mg tablet    Cholecalciferol (VITAMIN D) 125 MCG (5000 UT) CAPS    clonazePAM (KlonoPIN) 0 5 mg tablet    Cyanocobalamin 1000 MCG/ML KIT    diazepam (VALIUM) 5 mg tablet    diclofenac sodium (VOLTAREN) 1 %   dicyclomine (BENTYL) 20 mg tablet    dorzolamide-timolol (COSOPT) 22 3-6 8 MG/ML ophthalmic solution    DULoxetine (Cymbalta) 60 mg delayed release capsule    Eluxadoline (Viberzi) 100 MG TABS    esomeprazole (NexIUM) 40 MG capsule    famotidine (PEPCID) 40 MG tablet    fluticasone (FLONASE) 50 mcg/act nasal spray    folic acid (FOLVITE) 1 mg tablet    gabapentin (NEURONTIN) 600 MG tablet    guaifenesin-codeine (CHERATUSSIN AC) 100-10 MG/5ML liquid    hydrochlorothiazide (HYDRODIURIL) 25 mg tablet    Influenza Vac A&B SA Adj Quad (Fluad Quadrivalent) 0 5 ML PRSY    ketoconazole (NIZORAL) 2 % shampoo    lamoTRIgine (LaMICtal) 200 MG tablet    losartan (COZAAR) 100 MG tablet    Magnesium 300 MG CAPS    meloxicam (MOBIC) 15 mg tablet    methocarbamol (ROBAXIN) 500 mg tablet    modafinil (PROVIGIL) 100 mg tablet    mometasone (ELOCON) 0 1 % lotion    potassium chloride (K-DUR,KLOR-CON) 10 mEq tablet    promethazine-dextromethorphan (PHENERGAN-DM) 6 25-15 mg/5 mL oral syrup    rOPINIRole (REQUIP) 0 25 mg tablet    sucralfate (CARAFATE) 1 g tablet    tetanus-diphtheria-acellular pertussis (BOOSTRIX) injection    timolol (TIMOPTIC) 0 5 % ophthalmic solution    tiZANidine (ZANAFLEX) 4 mg tablet    Zoster Vac Recomb Adjuvanted (SHINGRIX) 50 MCG/0 5ML SUSR    cetirizine (ZYRTEC ALLERGY) 10 mg tablet    Allergies   Allergen Reactions    Shellfish-Derived Products - Food Allergy Anaphylaxis     Other reaction(s): Unknown    Wound Dressing Adhesive Rash     Other reaction(s): rash  Other reaction(s): rash    Hydrocodone Other (See Comments)     No longer    Medical Tape     Clarithromycin Rash           Objective     Blood pressure 138/80, pulse 86, height 5' 8" (1 727 m), weight 109 kg (240 lb)  Body mass index is 36 49 kg/m²        PHYSICAL EXAM:      General Appearance:   Alert, cooperative, no distress   HEENT:   Normocephalic, atraumatic, anicteric      Neck:  Supple, symmetrical, trachea midline   Lungs:   Clear to auscultation bilaterally; no rales, rhonchi or wheezing; respirations unlabored    Heart[de-identified]   Regular rate and rhythm; no murmur, rub, or gallop  Abdomen:   Soft, non-tender, non-distended; normal bowel sounds; no masses, no organomegaly    Genitalia:   Deferred    Rectal:   Deferred    Extremities:  No cyanosis, clubbing or edema    Pulses:  2+ and symmetric    Skin:  No jaundice, rashes, or lesions    Lymph nodes:  No palpable cervical lymphadenopathy        Lab Results:   No visits with results within 1 Day(s) from this visit  Latest known visit with results is:   Hospital Outpatient Visit on 09/13/2019   Component Date Value    POC Glucose 09/13/2019 90     Case Report 09/13/2019                      Value:Surgical Pathology Report                         Case: [id number]                                   Authorizing Provider:  Erika Pacheco DO          Collected:           09/13/2019 0165              Ordering Location:      [facility]       Received:            09/13/2019 [facility]                                                             Pathologist:           Nataly Junior MD                                                                Specimen:    Colon, ascending and sigmoid                                                               Final Diagnosis 09/13/2019                      Value: This result contains rich text formatting which cannot be displayed here   Note 09/13/2019                      Value: This result contains rich text formatting which cannot be displayed here   Additional Information 09/13/2019                      Value: This result contains rich text formatting which cannot be displayed here  Sydnee Judd Gross Description 09/13/2019                      Value: This result contains rich text formatting which cannot be displayed here      Clinical Information 09/13/2019 Value:Functional diarrhea         Radiology Results:   No results found

## 2021-04-20 NOTE — PROGRESS NOTES
[facility] - Outpatient Follow-up Note  Zaida Bailey 76 y o  female MRN: [id number]  Encounter: [id number]          ASSESSMENT AND PLAN:      1  Gastroesophageal reflux disease without esophagitis  2  Irritable bowel syndrome with diarrhea  -Will continue esomeprazole 40 mg twice a day  -Will continue Pepcid q h s  [place] -Patient cannot eat or drink 2 hours before lying down     -Encourage patient to really focus on dietary modifications and the timing of her meals     -As for her irritable bowel syndrome will encourage her to keep taking Viberzi 75 mg every other day   ______________________________________________________________________    SUBJECTIVE:   68-year-old female who presents today for follow-up of gastroesophageal reflux disease well as irritable bowel syndrome  Patient reports that she is still suffering with on and off issues of regurgitation and heartburn  Patient does report that this is her own fall due to her dietary choices  Patient does report that the [place] that she was taking also seem to make her little bit more constipated  But she does report when she goes through flare-up of diarrhea she is also now having fecal leakage  She denies any alarm symptoms  She reports she cannot exercise due to the fact that she has a bad knee and shoulder right now  She reports she is really struggling with her weight again as well as dietary choices  REVIEW OF SYSTEMS IS OTHERWISE NEGATIVE        Historical Information   Past Medical History:   Diagnosis Date    Arthritis     Colon polyp     CPAP (continuous positive airway pressure) dependence     Fibromyalgia, primary     GERD (gastroesophageal reflux disease)     Hypertension     Hypoglycemia     Irritable bowel syndrome     Liver disease     Osteoporosis     Pneumonia     Sleep apnea     Stenosis of cervical spine     and neck     Past Surgical History:   Procedure Laterality Date    CARPAL TUNNEL RELEASE      CATARACT EXTRACTION, BILATERAL      w/ repair of retina tear    CHOLECYSTECTOMY      COLONOSCOPY      GASTRIC BYPASS      HYSTERECTOMY      KNEE SURGERY      LUNG SURGERY      per patient "washing out"    SINUS SURGERY      TONSILLECTOMY       Social History   Social History     Substance and Sexual Activity   Alcohol Use Yes    Frequency: Monthly or less     Social History     Substance and Sexual Activity   Drug Use Not Currently     Social History     Tobacco Use   Smoking Status Former Smoker    Quit date:     Years since quittin 3   Smokeless Tobacco Never Used     Family History   Problem Relation Age of Onset    Diabetes Mother     Cancer Mother         lung cacer    Kidney disease Mother     Osteoarthritis Mother     COPD Mother     Blindness Mother     Glaucoma Mother     Macular degeneration Mother     Kidney disease Father     Heart disease Father     Cancer Father         bladder cancer       Meds/Allergies       Current Outpatient Medications:     alosetron (LOTRONEX) 1 MG tablet    Ascorbic Acid (JC-C PO)    aspirin 81 MG tablet    atoMOXetine (STRATTERA) 40 mg capsule    busPIRone (BUSPAR) 10 mg tablet    Cholecalciferol (VITAMIN D) 125 MCG (5000 UT) CAPS    clonazePAM (KlonoPIN) 0 5 mg tablet    Cyanocobalamin 1000 MCG/ML KIT    diazepam (VALIUM) 5 mg tablet    diclofenac sodium (VOLTAREN) 1 %    dicyclomine (BENTYL) 20 mg tablet    dorzolamide-timolol (COSOPT) 22 3-6 8 MG/ML ophthalmic solution    DULoxetine (Cymbalta) 60 mg delayed release capsule    Eluxadoline (Viberzi) 100 MG TABS    esomeprazole (NexIUM) 40 MG capsule    famotidine (PEPCID) 40 MG tablet    fluticasone (FLONASE) 50 mcg/act nasal spray    folic acid (FOLVITE) 1 mg tablet    gabapentin (NEURONTIN) 600 MG tablet    guaifenesin-codeine (CHERATUSSIN AC) 100-10 MG/5ML liquid    hydrochlorothiazide (HYDRODIURIL) 25 mg tablet    Influenza Vac A&B SA Adj Quad (Fluad Quadrivalent) 0 5 ML PRSY    ketoconazole (NIZORAL) 2 % shampoo    lamoTRIgine (LaMICtal) 200 MG tablet    losartan (COZAAR) 100 MG tablet    Magnesium 300 MG CAPS    meloxicam (MOBIC) 15 mg tablet    methocarbamol (ROBAXIN) 500 mg tablet    modafinil (PROVIGIL) 100 mg tablet    mometasone (ELOCON) 0 1 % lotion    potassium chloride (K-DUR,KLOR-CON) 10 mEq tablet    promethazine-dextromethorphan (PHENERGAN-DM) 6 25-15 mg/5 mL oral syrup    rOPINIRole (REQUIP) 0 25 mg tablet    sucralfate (CARAFATE) 1 g tablet    tetanus-diphtheria-acellular pertussis (BOOSTRIX) injection    timolol (TIMOPTIC) 0 5 % ophthalmic solution    tiZANidine (ZANAFLEX) 4 mg tablet    Zoster Vac Recomb Adjuvanted (SHINGRIX) 50 MCG/0 5ML SUSR    cetirizine (ZYRTEC ALLERGY) 10 mg tablet    Allergies   Allergen Reactions    Shellfish-Derived Products - Food Allergy Anaphylaxis     Other reaction(s): Unknown    Wound Dressing Adhesive Rash     Other reaction(s): rash  Other reaction(s): rash    Hydrocodone Other (See Comments)     No longer    Medical Tape     Clarithromycin Rash           Objective     Blood pressure 138/80, pulse 86, height 5' 8" (1 727 m), weight 109 kg (240 lb)  Body mass index is 36 49 kg/m²  PHYSICAL EXAM:      General Appearance:   Alert, cooperative, no distress   HEENT:   Normocephalic, atraumatic, anicteric      Neck:  Supple, symmetrical, trachea midline   Lungs:   Clear to auscultation bilaterally; no rales, rhonchi or wheezing; respirations unlabored    Heart[de-identified]   Regular rate and rhythm; no murmur, rub, or gallop     Abdomen:   Soft, non-tender, non-distended; normal bowel sounds; no masses, no organomegaly    Genitalia:   Deferred    Rectal:   Deferred    Extremities:  No cyanosis, clubbing or edema    Pulses:  2+ and symmetric    Skin:  No jaundice, rashes, or lesions    Lymph nodes:  No palpable cervical lymphadenopathy        Lab Results:   No visits with results within 1 Day(s) from this visit  Latest known visit with results is:   Hospital Outpatient Visit on 09/13/2019   Component Date Value    POC Glucose 09/13/2019 90     Case Report 09/13/2019                      Value:Surgical Pathology Report                         Case: [id number]                                   Authorizing Provider:  Abi Stoll DO          Collected:           09/13/2019 6955              Ordering Location:      [facility]       Received:            09/13/2019 [address] [place] [address] Endoscopy                                                             Pathologist:           Mary Curtis MD                                                                Specimen:    Colon, ascending and sigmoid                                                               Final Diagnosis 09/13/2019                      Value: This result contains rich text formatting which cannot be displayed here   Note 09/13/2019                      Value: This result contains rich text formatting which cannot be displayed here   Additional Information 09/13/2019                      Value: This result contains rich text formatting which cannot be displayed here  [facility] Gross Description 09/13/2019                      Value: This result contains rich text formatting which cannot be displayed here   Clinical Information 09/13/2019                      Value:Functional diarrhea         Radiology Results:   No results found

## 2021-04-20 NOTE — PATIENT INSTRUCTIONS
Nutrition Tips for Relief of Diarrhea   WHAT YOU NEED TO KNOW:   There are diet changes you can make to help relieve or stop diarrhea  These changes include limiting or avoiding foods and liquids that are high in sugar, fat, fiber, and lactose  Lactose is a sugar found in milk products  Milk products can cause diarrhea in people who are lactose intolerant  You should also drink extra liquids to replace fluids that are lost when you have diarrhea  Diarrhea can lead to dehydration  DISCHARGE INSTRUCTIONS:   Foods to limit or avoid:   · Dairy:      ? Whole milk    ? Half-and-half, cream, and sour cream    ? Regular (whole milk) ice cream    · Grains:      ? Whole wheat and whole grain breads, pasta, cereals, and crackers    ? Penne Humbles and wild rice    ? Breads and cereals with seeds or nuts    ? Popcorn    · Fruit and vegetables:      ? All raw fruits, except bananas and melon    ? Dried fruits, including prunes and raisins    ? Canned fruit in heavy syrup    ? Prune juice and any fruit juice with pulp    ? Raw vegetables, except lettuce     ? Fried vegetables    ? Corn, raw and cooked broccoli, cabbage, cauliflower, and thais greens    · Protein:      ? Fried meat, poultry, and fish    ? High-fat luncheon meats, such as bologna    ? Fatty meats, such as sausage, lobo, and hot dogs    ? Beans and nuts    · Liquids:      ? Sodas and fruit-flavored drinks    ? Drinks that contain caffeine, such as energy drinks, coffee, and tea     ? Drinks that contain alcohol or sugar alcohol, such as sorbitol    Foods and liquids you may eat or drink:  Most people can tolerate the foods and liquids listed below  If any of them make your symptoms worse, stop eating or drinking them until you feel better  If you are lactose intolerant, avoid milk products  · Dairy:      ? Skim or low-fat milk or evaporated milk    ? Soy milk or buttermilk     ? Low-fat, part-skim, and aged cheese    ?  Yogurt, low-fat ice cream, or sherbert    · Grains:  (Choose foods with less than 2 grams of dietary fiber per serving )     ? White or refined flour breads, bagels, pasta, and crackers    ? Cold or hot cereals made from white or refined flour such as puffed rice, cornflakes, or cream of wheat    ? White rice    · Fruit and vegetables:      ? Bananas or melon    ? Fruit juice without pulp, except prune juice    ? Canned fruit in juice or light syrup    ? Lettuce and most well-cooked vegetables without seeds or skins     ? Strained vegetable juice    · Protein:      ? Tender, well-cooked meat, poultry, or fish    ? Well-cooked eggs or soy foods (cooked without added fat)    ? Smooth nut butters    · Fats:  (Limit fats to less than 8 teaspoons a day)     ? Oil, butter, or margarine, or mayonnaise    ? Cream cheese or salad dressings    · Liquids:      ? For infants, breast milk or formula    ? Oral rehydration solution     ? Decaffeinated coffee or caffeine-free teas    ? Soft drinks without caffeine    Other guidelines to follow:   · Drink liquids as directed  You may need to drink more liquids than usual to prevent dehydration  Ask how much liquid to drink each day and which liquids are best for you  You may need to drink an oral rehydration solution (ORS)  An ORS helps replace fluids and electrolytes that you lose when you have diarrhea  · Eat small meals or snacks every 3 to 4 hours  instead of large meals  Continue eating even if you still have diarrhea  Your diarrhea will continue for a few days but should gradually go away  © Copyright [address] Information is for End User's use only and may not be sold, redistributed or otherwise used for commercial purposes  All illustrations and images included in CareNotes® are the copyrighted property of [employer]  or [facility] Rosangela Rollins   The above information is an  only  It is not intended as medical advice for individual conditions or treatments   Talk to your doctor, nurse or pharmacist before following any medical regimen to see if it is safe and effective for you

## 2021-05-04 ENCOUNTER — OFFICE VISIT (OUTPATIENT)
Dept: URGENT CARE | Facility: CLINIC | Age: 66
End: 2021-05-04
Payer: MEDICARE

## 2021-05-04 VITALS
RESPIRATION RATE: 18 BRPM | SYSTOLIC BLOOD PRESSURE: 136 MMHG | OXYGEN SATURATION: 96 % | HEART RATE: 80 BPM | TEMPERATURE: 97.4 F | DIASTOLIC BLOOD PRESSURE: 70 MMHG

## 2021-05-04 DIAGNOSIS — J02.9 ACUTE PHARYNGITIS, UNSPECIFIED ETIOLOGY: Primary | ICD-10-CM

## 2021-05-04 LAB — S PYO AG THROAT QL: NEGATIVE

## 2021-05-04 PROCEDURE — 87070 CULTURE OTHR SPECIMN AEROBIC: CPT | Performed by: PHYSICIAN ASSISTANT

## 2021-05-04 PROCEDURE — G0463 HOSPITAL OUTPT CLINIC VISIT: HCPCS | Performed by: PHYSICIAN ASSISTANT

## 2021-05-04 PROCEDURE — 87880 STREP A ASSAY W/OPTIC: CPT | Performed by: PHYSICIAN ASSISTANT

## 2021-05-04 PROCEDURE — U0005 INFEC AGEN DETEC AMPLI PROBE: HCPCS | Performed by: PHYSICIAN ASSISTANT

## 2021-05-04 PROCEDURE — U0003 INFECTIOUS AGENT DETECTION BY NUCLEIC ACID (DNA OR RNA); SEVERE ACUTE RESPIRATORY SYNDROME CORONAVIRUS 2 (SARS-COV-2) (CORONAVIRUS DISEASE [COVID-19]), AMPLIFIED PROBE TECHNIQUE, MAKING USE OF HIGH THROUGHPUT TECHNOLOGIES AS DESCRIBED BY CMS-2020-01-R: HCPCS | Performed by: PHYSICIAN ASSISTANT

## 2021-05-04 PROCEDURE — 99213 OFFICE O/P EST LOW 20 MIN: CPT | Performed by: PHYSICIAN ASSISTANT

## 2021-05-04 NOTE — PATIENT INSTRUCTIONS
Hydration and rest  Tylenol and motrin for pain or fever  Cool liquids, soft foods  Throat lozenges  Throat culture  COVID test  Home isolation  PCP follow up  Go to ER with worsening symptoms, difficulty breathing or swallowing  Pharyngitis   WHAT YOU NEED TO KNOW:   Pharyngitis, or sore throat, is inflammation of the tissues and structures in your pharynx (throat)  Pharyngitis is most often caused by bacteria  It may also be caused by a cold or flu virus  Other causes include smoking, allergies, or acid reflux  DISCHARGE INSTRUCTIONS:   Call 911 for any of the following:   · You have trouble breathing or swallowing because your throat is swollen or sore  Return to the emergency department if:   · You are drooling because it hurts too much to swallow  · Your fever is higher than 102? F (39?C) or lasts longer than 3 days  · You are confused  · You taste blood in your throat  Contact your healthcare provider if:   · Your throat pain gets worse  · You have a painful lump in your throat that does not go away after 5 days  · Your symptoms do not improve after 5 days  · You have questions or concerns about your condition or care  Medicines:  Viral pharyngitis will go away on its own without treatment  Your sore throat should start to feel better in 3 to 5 days for both viral and bacterial infections  You may need any of the following:  · Antibiotics  treat a bacterial infection  · NSAIDs , such as ibuprofen, help decrease swelling, pain, and fever  NSAIDs can cause stomach bleeding or kidney problems in certain people  If you take blood thinner medicine, always ask your healthcare provider if NSAIDs are safe for you  Always read the medicine label and follow directions  · Acetaminophen  decreases pain and fever  It is available without a doctor's order  Ask how much to take and how often to take it  Follow directions   Acetaminophen can cause liver damage if not taken correctly  · Take your medicine as directed  Contact your healthcare provider if you think your medicine is not helping or if you have side effects  Tell him or her if you are allergic to any medicine  Keep a list of the medicines, vitamins, and herbs you take  Include the amounts, and when and why you take them  Bring the list or the pill bottles to follow-up visits  Carry your medicine list with you in case of an emergency  Manage your symptoms:   · Gargle salt water  Mix ¼ teaspoon salt in an 8 ounce glass of warm water and gargle  This may help decrease swelling in your throat  · Drink liquids as directed  You may need to drink more liquids than usual  Liquids may help soothe your throat and prevent dehydration  Ask how much liquid to drink each day and which liquids are best for you  · Use a cool-steam humidifier  to help moisten the air in your room and calm your cough  · Soothe your throat  with cough drops, ice, soft foods, or popsicles  Prevent the spread of pharyngitis:  Cover your mouth and nose when you cough or sneeze  Do not share food or drinks  Wash your hands often  Use soap and water  If soap and water are unavailable, use an alcohol based hand   Follow up with your healthcare provider as directed:  Write down your questions so you remember to ask them during your visits  © Copyright [address] Information is for End User's use only and may not be sold, redistributed or otherwise used for commercial purposes  All illustrations and images included in CareNotes® are the copyrighted property of [employer]  or [address] [place], [place] [address]   The above information is an  only  It is not intended as medical advice for individual conditions or treatments  Talk to your doctor, nurse or pharmacist before following any medical regimen to see if it is safe and effective for you

## 2021-05-04 NOTE — PROGRESS NOTES
[address]        NAME: Kiran Evans is a 76 y o  female  : 1955    MRN: [id number]  DATE: May 4, 2021  TIME: 4:53 PM    Assessment and Plan   Acute pharyngitis, unspecified etiology [J02 9]  1  Acute pharyngitis, unspecified etiology  POCT rapid strepA    Throat culture    Novel Coronavirus (Covid-19),PCR [facility]         Patient Instructions     Patient Instructions   Hydration and rest  Tylenol and motrin for pain or fever  Cool liquids, soft foods  Throat lozenges  Throat culture  COVID test  Home isolation  PCP follow up  Go to ER with worsening symptoms, difficulty breathing or swallowing  Pharyngitis   WHAT YOU NEED TO KNOW:   Pharyngitis, or sore throat, is inflammation of the tissues and structures in your pharynx (throat)  Pharyngitis is most often caused by bacteria  It may also be caused by a cold or flu virus  Other causes include smoking, allergies, or acid reflux  DISCHARGE INSTRUCTIONS:   Call 911 for any of the following:   · You have trouble breathing or swallowing because your throat is swollen or sore  Return to the emergency department if:   · You are drooling because it hurts too much to swallow  · Your fever is higher than 102? F (39?C) or lasts longer than 3 days  · You are confused  · You taste blood in your throat  Contact your healthcare provider if:   · Your throat pain gets worse  · You have a painful lump in your throat that does not go away after 5 days  · Your symptoms do not improve after 5 days  · You have questions or concerns about your condition or care  Medicines:  Viral pharyngitis will go away on its own without treatment  Your sore throat should start to feel better in 3 to 5 days for both viral and bacterial infections  You may need any of the following:  · Antibiotics  treat a bacterial infection  · NSAIDs , such as ibuprofen, help decrease swelling, pain, and fever   NSAIDs can cause stomach bleeding or kidney problems in certain people  If you take blood thinner medicine, always ask your healthcare provider if NSAIDs are safe for you  Always read the medicine label and follow directions  · Acetaminophen  decreases pain and fever  It is available without a doctor's order  Ask how much to take and how often to take it  Follow directions  Acetaminophen can cause liver damage if not taken correctly  · Take your medicine as directed  Contact your healthcare provider if you think your medicine is not helping or if you have side effects  Tell him or her if you are allergic to any medicine  Keep a list of the medicines, vitamins, and herbs you take  Include the amounts, and when and why you take them  Bring the list or the pill bottles to follow-up visits  Carry your medicine list with you in case of an emergency  Manage your symptoms:   · Gargle salt water  Mix ¼ teaspoon salt in an 8 ounce glass of warm water and gargle  This may help decrease swelling in your throat  · Drink liquids as directed  You may need to drink more liquids than usual  Liquids may help soothe your throat and prevent dehydration  Ask how much liquid to drink each day and which liquids are best for you  · Use a cool-steam humidifier  to help moisten the air in your room and calm your cough  · Soothe your throat  with cough drops, ice, soft foods, or popsicles  Prevent the spread of pharyngitis:  Cover your mouth and nose when you cough or sneeze  Do not share food or drinks  Wash your hands often  Use soap and water  If soap and water are unavailable, use an alcohol based hand   Follow up with your healthcare provider as directed:  Write down your questions so you remember to ask them during your visits  © Copyright [address] Information is for End User's use only and may not be sold, redistributed or otherwise used for commercial purposes   All illustrations and images included in CareNotes® are the copyrighted property of A D A M , Inc  or [address] [place], [place] [address]earl   The above information is an  only  It is not intended as medical advice for individual conditions or treatments  Talk to your doctor, nurse or pharmacist before following any medical regimen to see if it is safe and effective for you  **Portions of the record may have been created with voice recognition software  Occasional wrong word or "sound a like" substitutions may have occurred due to the inherent limitations of voice recognition software  Read the chart carefully and recognize, using context, where substitutions have occurred  **     Chief Complaint     Chief Complaint   Patient presents with    Sore Throat     Pt c/o sore throat and ear pain on right side that started today         History of Present Illness       77-year-old female presents to clinic with complaints of sore throat and right-sided ear pain times day  She denies any fever, difficulty breathing swallowing, chest pain or shortness of breath, loss of taste or smell  She has been vaccinated for COVID-19 and denies any known sick contacts or recent travel  Last  Thursday  she had a dental implant surgery on her right lower jaw  Review of Systems     Review of Systems   Constitutional: Negative for chills, fatigue and fever  HENT: Positive for ear pain and sore throat  Negative for congestion  Respiratory: Negative for cough and shortness of breath  Cardiovascular: Negative for chest pain  Gastrointestinal: Negative for vomiting  Musculoskeletal: Negative for myalgias  Skin: Negative for rash           Current Medications     Current Outpatient Medications:     Ascorbic Acid (JC-C PO), 1, Disp: , Rfl:     aspirin 81 MG tablet, Take 81 mg by mouth daily, Disp: , Rfl:     atoMOXetine (STRATTERA) 40 mg capsule, atomoxetine 40 mg capsule, Disp: , Rfl:     busPIRone (BUSPAR) 10 mg tablet, buspirone 10 mg tablet, Disp: , Rfl:     cetirizine (ZYRTEC ALLERGY) 10 mg tablet, Take 10 mg by mouth daily, Disp: , Rfl:     Cholecalciferol (VITAMIN D) 125 MCG (5000 UT) CAPS, , Disp: , Rfl:     clonazePAM (KlonoPIN) 0 5 mg tablet, clonazepam 0 5 mg tablet, Disp: , Rfl:     Cyanocobalamin 1000 MCG/ML KIT, INJECT 1 ML AS DIRECTED EVERY 30 DAYS, Disp: , Rfl:     diazepam (VALIUM) 5 mg tablet, diazepam 5 mg tablet, Disp: , Rfl:     dorzolamide-timolol (COSOPT) 22 3-6 8 MG/ML ophthalmic solution, dorzolamide 22 3 mg-timolol 6 8 mg/mL eye drops, Disp: , Rfl:     DULoxetine (Cymbalta) 60 mg delayed release capsule, Take 60 mg by mouth daily, Disp: , Rfl:     Eluxadoline (Viberzi) 100 MG TABS, Take 1 tablet by mouth 2 (two) times a day, Disp: , Rfl:     esomeprazole (NexIUM) 40 MG capsule, TAKE 1 CAPSULE (40 MG TOTAL) BY MOUTH 2 (TWO) TIMES A DAY BEFORE MEALS, Disp: 180 capsule, Rfl: 1    famotidine (PEPCID) 40 MG tablet, TAKE 1 TABLET BY MOUTH EVERY DAY, Disp: 90 tablet, Rfl: 1    folic acid (FOLVITE) 1 mg tablet, folic acid 1 mg tablet, Disp: , Rfl:     gabapentin (NEURONTIN) 600 MG tablet, gabapentin 600 mg tablet, Disp: , Rfl:     hydrochlorothiazide (HYDRODIURIL) 25 mg tablet, hydrochlorothiazide 25 mg tablet, Disp: , Rfl:     lamoTRIgine (LaMICtal) 200 MG tablet, , Disp: , Rfl:     losartan (COZAAR) 100 MG tablet, Take by mouth, Disp: , Rfl:     Magnesium 300 MG CAPS, Take 1 tablet by mouth daily, Disp: , Rfl:     meloxicam (MOBIC) 15 mg tablet, , Disp: , Rfl:     methocarbamol (ROBAXIN) 500 mg tablet, Take 500 mg by mouth 3 (three) times a day, Disp: , Rfl:     mometasone (ELOCON) 0 1 % lotion, mometasone 0 1 % topical solution, Disp: , Rfl:     potassium chloride (K-DUR,KLOR-CON) 10 mEq tablet, Take 10 mEq by mouth, Disp: , Rfl:     rOPINIRole (REQUIP) 0 25 mg tablet, ropinirole 0 25 mg tablet, Disp: , Rfl:     timolol (TIMOPTIC) 0 5 % ophthalmic solution, INSTILL 1 DROP INTO LEFT EYE TWICE DAILY, Disp: , Rfl:    alosetron (LOTRONEX) 1 MG tablet, Take 1 tablet (1 mg total) by mouth daily (Patient not taking: Reported on 5/4/2021), Disp: 30 tablet, Rfl: 1    diclofenac sodium (VOLTAREN) 1 %, Place on the skin, Disp: , Rfl:     dicyclomine (BENTYL) 20 mg tablet, Take 1 tablet (20 mg total) by mouth every 6 (six) hours (Patient not taking: Reported on 5/4/2021), Disp: 360 tablet, Rfl: 3    fluticasone (FLONASE) 50 mcg/act nasal spray, , Disp: , Rfl:     guaifenesin-codeine (CHERATUSSIN AC) 100-10 MG/5ML liquid, Take by mouth, Disp: , Rfl:     Influenza Vac A&B SA Adj Quad (Fluad Quadrivalent) 0 5 ML [facility] Fluad Quad [id number](75SU up)(PF) 60 mcg (15 mcg x 4)/0 5mL IM syringe  PHARMACY ADMINISTERED, Disp: , Rfl:     ketoconazole (NIZORAL) 2 % shampoo, ketoconazole 2 % shampoo, Disp: , Rfl:     modafinil (PROVIGIL) 100 mg tablet, modafinil 100 mg tablet, Disp: , Rfl:     promethazine-dextromethorphan (PHENERGAN-DM) 6 25-15 mg/5 mL oral syrup, Take by mouth, Disp: , Rfl:     sucralfate (CARAFATE) 1 g tablet, TAKE 1 TABLET BY MOUTH FOUR TIMES A DAY (Patient not taking: Reported on 5/4/2021), Disp: 120 tablet, Rfl: 0    tetanus-diphtheria-acellular pertussis (BOOSTRIX) injection, Boostrix Tdap 2 5 Lf unit-8 mcg-5 Lf/0 5 mL intramuscular syringe, Disp: , Rfl:     tiZANidine (ZANAFLEX) 4 mg tablet, Take by mouth, Disp: , Rfl:     Zoster Vac Recomb Adjuvanted (SHINGRIX) 50 MCG/0 5ML SUSR, Shingrix (PF) 50 mcg/0 5 mL intramuscular suspension, kit, Disp: , Rfl:     Current Allergies     Allergies as of 05/04/2021 - Reviewed 05/04/2021   Allergen Reaction Noted    Shellfish-derived products - food allergy Anaphylaxis 09/20/2017    Wound dressing adhesive Rash 01/27/2016    Hydrocodone Other (See Comments) 08/26/2006    Medical tape  08/28/2018    Clarithromycin Rash 08/15/2019            The following portions of the patient's history were reviewed and updated as appropriate: allergies, current medications, past family history, past medical history, past social history, past surgical history and problem list      Past Medical History:   Diagnosis Date    Arthritis     Colon polyp     CPAP (continuous positive airway pressure) dependence     Fibromyalgia, primary     GERD (gastroesophageal reflux disease)     Hypertension     Hypoglycemia     Irritable bowel syndrome     Liver disease     Osteoporosis     Pneumonia     Sleep apnea     Stenosis of cervical spine     and neck       Past Surgical History:   Procedure Laterality Date    CARPAL TUNNEL RELEASE      CATARACT EXTRACTION, BILATERAL      w/ repair of retina tear    CHOLECYSTECTOMY      COLONOSCOPY      GASTRIC BYPASS      HYSTERECTOMY      KNEE SURGERY      LUNG SURGERY      per patient "washing out"    SINUS SURGERY      TONSILLECTOMY         Family History   Problem Relation Age of Onset    Diabetes Mother     Cancer Mother         lung cacer    Kidney disease Mother     Osteoarthritis Mother     COPD Mother     Blindness Mother     Glaucoma Mother     Macular degeneration Mother     Kidney disease Father     Heart disease Father     Cancer Father         bladder cancer         Medications have been verified  Objective     /70   Pulse 80   Temp (!) 97 4 °F (36 3 °C) (Temporal)   Resp 18   SpO2 96%        Physical Exam     Physical Exam  Vitals signs and nursing note reviewed  Constitutional:       General: She is not in acute distress  Appearance: She is well-developed  She is not diaphoretic  HENT:      Head: Normocephalic and atraumatic  Right Ear: Tympanic membrane is erythematous  Left Ear: Tympanic membrane is erythematous  Mouth/Throat:      Pharynx: Uvula midline  Posterior oropharyngeal erythema present  Cardiovascular:      Rate and Rhythm: Normal rate and regular rhythm  Pulmonary:      Effort: Pulmonary effort is normal       Breath sounds: Normal breath sounds     Lymphadenopathy: Cervical: Cervical adenopathy present  Skin:     Findings: No rash  Neurological:      Mental Status: She is alert

## 2021-05-05 LAB — SARS-COV-2 RNA RESP QL NAA+PROBE: NEGATIVE

## 2021-05-06 LAB — BACTERIA THROAT CULT: NORMAL

## 2021-07-07 ENCOUNTER — EVALUATION (OUTPATIENT)
Dept: PHYSICAL THERAPY | Age: 66
End: 2021-07-07
Payer: MEDICARE

## 2021-07-07 DIAGNOSIS — M25.511 RIGHT SHOULDER PAIN, UNSPECIFIED CHRONICITY: Primary | ICD-10-CM

## 2021-07-07 PROCEDURE — 97110 THERAPEUTIC EXERCISES: CPT | Performed by: PHYSICAL THERAPIST

## 2021-07-07 PROCEDURE — 97140 MANUAL THERAPY 1/> REGIONS: CPT | Performed by: PHYSICAL THERAPIST

## 2021-07-07 PROCEDURE — 97161 PT EVAL LOW COMPLEX 20 MIN: CPT | Performed by: PHYSICAL THERAPIST

## 2021-07-07 NOTE — LETTER
2021    Geovani Conway MD  [address] [address]    Patient: Rhonda Arenas   YOB: 1955   Date of Visit: 2021     Encounter Diagnosis     ICD-10-CM    1  Right shoulder pain, unspecified chronicity  M25 511        Dear Dr Elvia Zhou:    Thank you for your recent referral of Rhonda Arenas  Please review the attached evaluation summary from Paulina's recent visit  Please verify that you agree with the plan of care by signing the attached order  If you have any questions or concerns, please do not hesitate to call  I sincerely appreciate the opportunity to share in the care of one of your patients and hope to have another opportunity to work with you in the near future  Sincerely,    Evonne Zhong, PT      Referring Provider:      I certify that I have read the below Plan of Care and certify the need for these services furnished under this plan of treatment while under my care  Geovani Conway MD  [address] [address]  Via Fax: [phone]          PT Evaluation     Today's date: 2021  Patient name: Rhonda Arenas  : 1955  MRN: [id number]  Referring provider: Thi Lopez MD  Dx:   Encounter Diagnosis     ICD-10-CM    1  Right shoulder pain, unspecified chronicity  M25 511        Start Time: 0800  Stop Time: 0850  Total time in clinic (min): 50 minutes    Assessment  Assessment details: Rhonda Arenas is a 76 y o  female who presents with pain, decreased strength, decreased ROM and decreased joint mobility  Due to these impairments, Patient has difficulty performing a/iadls, recreational activities and engaging in social activities  Patient's clinical presentation is consistent with their referring diagnosis of right shoulder pain s/p RTC repair   Patient would benefit from skilled physical therapy to address their aforementioned impairments, improve their level of function and to improve their overall quality of life following post surgical protocol  Despite presenting with prescription for PT this date, clarification with Ronnie at Dr Jeff Meehan office confirms PT start at three weeks post op after 21  Patient scheduled to resume PT 21 at this office  MD office aware of todays visit and sling removal  Patient advised no pendulum or shoulder movement until MD visit  Impairments: abnormal or restricted ROM, activity intolerance, impaired physical strength, lacks appropriate home exercise program, pain with function, poor posture  and poor body mechanics  Understanding of Dx/Px/POC: good   Prognosis: good    Goals  ST-3 WEEKS  1  Decrease pain by 2-4 points on VAS at its worst   2   Increase ROM right shoulder to [facility] per post surgical protocol      LT-6 WEEKS  1  Patient to advance to [facility] per protocol  2  Increase functional activities for light ADLs at home within protocol parameters  3  Independent with HEP ongoing throughout [address]  Patient would benefit from: skilled physical therapy  Planned modality interventions: cryotherapy and thermotherapy: hydrocollator packs  Planned therapy interventions: activity modification, behavior modification, body mechanics training, functional ROM exercises, home exercise program, joint mobilization, manual therapy, neuromuscular re-education, patient education, postural training, therapeutic activities, therapeutic exercise and IADL retraining  Frequency: 2-3x week  Duration in weeks: 8  Plan of Care beginning date: 2021  Plan of Care expiration date: 10/5/2021  Treatment plan discussed with: patient        Subjective Evaluation    History of Present Illness  Date of surgery: 2021  Mechanism of injury: Right shoulder pain for some time  Progressively worse  Trevor Pedro three times in the last 8 months  Hit shoulder on step  Prior surgery right shoulder for spur - greater than 10 years ago  PT  And injections with no relief    MRI was positive RTC tear  Under went surgery 21  Saw MD yesterday with sutures removed  Advised sling for three weeks  Now referred for out patient PT  Patient presents with sling against skin and shirt over  Assisted patient with sling removal and donning shirt  Quality of life: good    Pain  Current pain ratin  At best pain ratin  At worst pain rating: 10  Location: right anterior superior shoulder with tingling in to arm and some elbow/wrist discomfort  Quality: throbbing and tight  Relieving factors: rest    Hand dominance: ambidextrous    Patient Goals  Patient goals for therapy: decreased pain, increased strength, increased motion and independence with ADLs/IADLs          Objective     Observations   Left Shoulder   Positive for edema and incision  Additional Observation Details  Chafed areas with abrasion torso from wearing sling against skin    Neurological Testing     Sensation     Shoulder   Left Shoulder   Intact: light touch  Paresthesia: light touch    Passive Range of Motion     Additional Passive Range of Motion Details  ROM to distal UE only  Patient performed standing bent over pendulum with hang  Wrist, digit ROM WFLs with end range tightness  Elbow ROM - 5 degrees extension; flexion WFL    No PROM shoulder until cleared by physician      Strength/Myotome Testing     Additional Strength Details  Strength testing deferred due to post surgical contrindication             Precautions: HTN, FMS      Manuals                                                                 Neuro Re-Ed                                                                                                        Ther Ex             AROM distal R UE 10x            scap pinch 10x            Cervical AROM 5x            pendulum No mvmt  3'            Pt ed re sling 5'                                                   Ther Activity                                       Gait Training Modalities             CP post  10'

## 2021-07-07 NOTE — PROGRESS NOTES
PT Evaluation     Today's date: 2021  Patient name: Robert Eddy  : 1955  MRN: [id number]  Referring provider: Tae Purvis MD  Dx:   Encounter Diagnosis     ICD-10-CM    1  Right shoulder pain, unspecified chronicity  M25 511        Start Time: 0800  Stop Time: 0850  Total time in clinic (min): 50 minutes    Assessment  Assessment details: Robert Eddy is a 76 y o  female who presents with pain, decreased strength, decreased ROM and decreased joint mobility  Due to these impairments, Patient has difficulty performing a/iadls, recreational activities and engaging in social activities  Patient's clinical presentation is consistent with their referring diagnosis of right shoulder pain s/p RTC repair  Patient would benefit from skilled physical therapy to address their aforementioned impairments, improve their level of function and to improve their overall quality of life following post surgical protocol  Despite presenting with prescription for PT this date, clarification with Ronnie at Dr Vamshi Mijares office confirms PT start at three weeks post op after 21  Patient scheduled to resume PT 21 at this office  MD office aware of todays visit and sling removal  Patient advised no pendulum or shoulder movement until MD visit  Impairments: abnormal or restricted ROM, activity intolerance, impaired physical strength, lacks appropriate home exercise program, pain with function, poor posture  and poor body mechanics  Understanding of Dx/Px/POC: good   Prognosis: good    Goals  ST-3 WEEKS  1  Decrease pain by 2-4 points on VAS at its worst   2   Increase ROM right shoulder to [facility] per post surgical protocol      LT-6 WEEKS  1  Patient to advance to [facility] per protocol  2  Increase functional activities for light ADLs at home within protocol parameters  3   Independent with HEP ongoing throughout [place] [address]  Patient would benefit from: skilled physical therapy  Planned modality interventions: cryotherapy and thermotherapy: hydrocollator packs  Planned therapy interventions: activity modification, behavior modification, body mechanics training, functional ROM exercises, home exercise program, joint mobilization, manual therapy, neuromuscular re-education, patient education, postural training, therapeutic activities, therapeutic exercise and IADL retraining  Frequency: 2-3x week  Duration in weeks: 8  Plan of Care beginning date: 2021  Plan of Care expiration date: 10/5/2021  Treatment plan discussed with: patient        Subjective Evaluation    History of Present Illness  Date of surgery: 2021  Mechanism of injury: Right shoulder pain for some time  Progressively worse  Leanora Bough three times in the last 8 months  Hit shoulder on step  Prior surgery right shoulder for spur - greater than 10 years ago  PT  And injections with no relief  MRI was positive RTC tear  Under went surgery 21  Saw MD yesterday with sutures removed  Advised sling for three weeks  Now referred for out patient PT  Patient presents with sling against skin and shirt over  Assisted patient with sling removal and donning shirt  Quality of life: good    Pain  Current pain ratin  At best pain ratin  At worst pain rating: 10  Location: right anterior superior shoulder with tingling in to arm and some elbow/wrist discomfort  Quality: throbbing and tight  Relieving factors: rest    Hand dominance: ambidextrous    Patient Goals  Patient goals for therapy: decreased pain, increased strength, increased motion and independence with ADLs/IADLs          Objective     Observations   Left Shoulder   Positive for edema and incision       Additional Observation Details  Chafed areas with abrasion torso from wearing sling against skin    Neurological Testing     Sensation     Shoulder   Left Shoulder   Intact: light touch  Paresthesia: light touch    Passive Range of Motion     Additional Passive Range of Motion Details  ROM to distal UE only  Patient performed standing bent over pendulum with hang  Wrist, digit ROM WFLs with end range tightness  Elbow ROM - 5 degrees extension; flexion WFL    No PROM shoulder until cleared by physician      Strength/Myotome Testing     Additional Strength Details  Strength testing deferred due to post surgical contrindication             Precautions: HTN, FMS      Manuals 7/7                                                                Neuro Re-Ed                                                                                                        Ther Ex             AROM distal R UE 10x            scap pinch 10x            Cervical AROM 5x            pendulum No mvmt  3'            Pt ed re sling 5'                                                   Ther Activity                                       Gait Training                                       Modalities             CP post  10'

## 2021-07-09 ENCOUNTER — APPOINTMENT (OUTPATIENT)
Dept: PHYSICAL THERAPY | Age: 66
End: 2021-07-09
Payer: MEDICARE

## 2021-07-12 ENCOUNTER — APPOINTMENT (OUTPATIENT)
Dept: PHYSICAL THERAPY | Age: 66
End: 2021-07-12
Payer: MEDICARE

## 2021-07-15 ENCOUNTER — APPOINTMENT (OUTPATIENT)
Dept: PHYSICAL THERAPY | Age: 66
End: 2021-07-15
Payer: MEDICARE

## 2021-07-19 ENCOUNTER — APPOINTMENT (OUTPATIENT)
Dept: PHYSICAL THERAPY | Age: 66
End: 2021-07-19
Payer: MEDICARE

## 2021-07-22 ENCOUNTER — OFFICE VISIT (OUTPATIENT)
Dept: PHYSICAL THERAPY | Age: 66
End: 2021-07-22
Payer: MEDICARE

## 2021-07-22 DIAGNOSIS — M25.511 RIGHT SHOULDER PAIN, UNSPECIFIED CHRONICITY: Primary | ICD-10-CM

## 2021-07-22 PROCEDURE — 97110 THERAPEUTIC EXERCISES: CPT | Performed by: PHYSICAL THERAPIST

## 2021-07-22 PROCEDURE — 97140 MANUAL THERAPY 1/> REGIONS: CPT | Performed by: PHYSICAL THERAPIST

## 2021-07-22 NOTE — PROGRESS NOTES
Daily Note     Today's date: 2021  Patient name: Machelle Gloria  : 1955  MRN: [id number]  Referring provider: Angi Marroquin MD  Dx:   Encounter Diagnosis     ICD-10-CM    1  Right shoulder pain, unspecified chronicity  M25 511        Start Time: 1615  Stop Time: 1700  Total time in clinic (min): 45 minutes    Subjective:  Patient notes shoulder pain 6/10 general entire shoulder  Notes clicking and catching in shoulder  Physicain  Discharged sling two days ago  Told to avoid lifting  Objective: See treatment diary below  PROM right shoulder: , ABD 90, ER 55, IR 30 (Rotations at 90 degrees scaption)      Assessment: Tolerated treatment well  Patient would benefit from continued PT  Ecchymosis fading lateral right arm   Palpable lump lateral arm at deltoid insertion area (patient notes was present prior to surgery)      Plan: Continue PT per protocol     Precautions: HTN, FMS      Manuals            PROM R shoulder  15                                                  Neuro Re-Ed                                                                                                        Ther Ex             AROM distal R UE 10x            scap pinch 10x 20x           Cervical AROM 5x 5x           pendulum No mvmt  3' 5'           Ball AA table slides  Yellow  30x           pulley  nv                                     Ther Activity                                       Gait Training                                       Modalities             CP post  10'

## 2021-07-26 ENCOUNTER — EVALUATION (OUTPATIENT)
Dept: PHYSICAL THERAPY | Age: 66
End: 2021-07-26
Payer: MEDICARE

## 2021-07-26 DIAGNOSIS — Z98.890 S/P ROTATOR CUFF SURGERY: ICD-10-CM

## 2021-07-26 DIAGNOSIS — M25.511 RIGHT SHOULDER PAIN, UNSPECIFIED CHRONICITY: ICD-10-CM

## 2021-07-26 DIAGNOSIS — M54.16 LUMBAR RADICULOPATHY: Primary | ICD-10-CM

## 2021-07-26 PROCEDURE — 97110 THERAPEUTIC EXERCISES: CPT | Performed by: PHYSICAL THERAPIST

## 2021-07-26 PROCEDURE — 97140 MANUAL THERAPY 1/> REGIONS: CPT | Performed by: PHYSICAL THERAPIST

## 2021-07-26 PROCEDURE — 97162 PT EVAL MOD COMPLEX 30 MIN: CPT | Performed by: PHYSICAL THERAPIST

## 2021-07-26 NOTE — LETTER
2021    Dearl MD Hima  [address] [place] [address]    Patient: Mckay Mccarty   YOB: 1955   Date of Visit: 2021     Encounter Diagnosis     ICD-10-CM    1  Lumbar radiculopathy  M54 16    2  Right shoulder pain, unspecified chronicity  M25 511    3  S/P rotator cuff surgery  Z98 890        Dear Dr Andrew Pearson: Thank you for your recent referral of Mckay Mccarty  Please review the attached evaluation summary from Paulina's recent visit  Please verify that you agree with the plan of care by signing the attached order  If you have any questions or concerns, please do not hesitate to call  I sincerely appreciate the opportunity to share in the care of one of your patients and hope to have another opportunity to work with you in the near future  Sincerely,    Ana Luisa Mortensen, PT      Referring Provider:      I certify that I have read the below Plan of Care and certify the need for these services furnished under this plan of treatment while under my care  Dearbaldemar Rabago MD  [address] [place] [address]  Via Fax: [phone]          PT Evaluation     Today's date: 2021  Patient name: Mckay Mccarty  : 1955  MRN: [id number]  Referring provider: Vj Baron MD  Dx:   Encounter Diagnosis     ICD-10-CM    1  Lumbar radiculopathy  M54 16    2  Right shoulder pain, unspecified chronicity  M25 511    3  S/P rotator cuff surgery  Z98 [id number]        Start Time: 1040  Stop Time: 1145  Total time in clinic (min): 65 minutes    Assessment  Assessment details: Mckay Mccarty is a 76 y o  female who presents with lumbar pain, limited AROM, and decreased strength in core and LEs  Due to these impairments, Patient has difficulty performing a/iadls and recreational activities including walking for exercise, gardening and photography   Patient's clinical presentation is consistent with their referring diagnosis of lumbar radiculopathy  She is generally deconditioned and inactive due to chronic pain  She is currently being seen Iin this office for PT s/p right shoulder surgery  Patient could benefit from a trial of skilled physical therapy to address their aforementioned impairments, improve their level of function and to improve their overall quality of life  Focus initially on right shoulder due to immediate nature of surgery and need for rehabilitation  She could benefit from a mobility and strengthening program for the LB once shoulder rehabilitation progresses  Impairments: abnormal or restricted ROM, activity intolerance, impaired physical strength, lacks appropriate home exercise program, pain with function, poor posture  and poor body mechanics  Understanding of Dx/Px/POC: good   Prognosis: good    Goals  ST-3 WEEKS  1  Decrease pain by 2 points on VAS at its worst   2   Increase ROM lumbar spine in all planes with decreased pain through ROM  3  Increase LE and core strength by 1/2 MMT grade in all deficient planes  LT-8 WEEKS  1  Patient to be independent with a/iadls including increased tolerance for meal preparation and grocery shopping  2  Increase functional activities for leisure with increased walking tolerance to better return to exercise  3  Independent with HEP and/or fitness program     Plan  Patient would benefit from: skilled physical therapy  Planned modality interventions: cryotherapy, thermotherapy: hydrocollator packs and unattended electrical stimulation  Planned therapy interventions: aquatic therapy, home exercise program, joint mobilization, manual therapy, neuromuscular re-education, patient education, postural training, therapeutic activities and therapeutic exercise  Frequency: 1-2 times per week    Duration in weeks: 8  Plan of Care beginning date: 2021  Plan of Care expiration date: 10/24/2021  Treatment plan discussed with: patient        Subjective Evaluation    History of Present Illness  Mechanism of injury: Long standing history of low back pain with sciatica symptoms  Lumbar surgery 2018 for discectomy with some relief  Notes recent increase in low back pain  Notes difficulty doing daily activities  Spouse needs to help with IADLs due to pain  No longer able to shop or go for walks for exercise  Enjoyed gardening and photography andis limited due to back pain  Pain  Current pain ratin  At best pain ratin  At worst pain ratin  Location: low back rigth greater than left wit hright LE radicular pain generally ot knee; right hip  Quality: burning, dull ache and sharp  Relieving factors: change in position and rest  Aggravating factors: standing and walking    Social Support  Lives with: spouse    Patient Goals  Patient goals for therapy: decreased pain, increased motion and return to sport/leisure activities  Patient goal: return ot walking for exercise        Objective     Static Posture   General Observations  Asymmetrical weight bearing  Head  Forward  Shoulders  Rounded  Lumbar Spine   Flattened  Pelvis   Pelvis (Right): Elevated  Palpation   Left   Hypertonic in the erector spinae and quadratus lumborum  Tenderness of the quadratus lumborum  Right   Hypertonic in the erector spinae and quadratus lumborum  Tenderness of the erector spinae and quadratus lumborum  Tenderness     Lumbar Spine  Tenderness in the spinous process (L5)  Left Hip   Tenderness in the PSIS  Right Hip   Tenderness in the PSIS       Neurological Testing     Sensation     Lumbar   Left   Intact: light touch  Paresthesia: light touch    Right   Intact: light touch  Paresthesia: light touch    Comments   Right light touch: lateral foot    Active Range of Motion     Lumbar   Flexion: 55 degrees   Extension: 15 degrees  with pain  Left lateral flexion: 20 degrees       Right lateral flexion: 15 degrees  with pain    Strength/Myotome Testing Left Hip   Planes of Motion   Flexion: 4  Abduction: 4  Adduction: 4    Right Hip   Planes of Motion   Flexion: 4-  Abduction: 4-  Adduction: 4-    Left Knee   Flexion: 4  Extension: 4    Right Knee   Flexion: 4  Extension: 4-    General Comments:      Hip Comments   History of right hip bursitis  Tender to palpation right lateral hip/greater trochanter  Mild tenderness left  Knee Comments  History bilateral knee surgery     Pain right knee with AROM with ROM WFL          Precautions: HTN, FMS      Manuals 7/7 7/22 7/26          PROM R shoulder  15 15          LB/LE   8                                    Neuro Re-Ed                                                                                                        Ther Ex             AROM distal R UE 10x            scap pinch 10x 20x           Cervical AROM 5x 5x           pendulum   3' 5' 3'          Ball AA table slides/ CW/CCW  Yellow  30x 30x  each          pulley  nv 3'           Sub max isometrics - FF, EXT, IR/ER   3"/10x each                       Ther Activity                                       Gait Training                                       Modalities             CP post  10'

## 2021-07-26 NOTE — PROGRESS NOTES
PT Evaluation     Today's date: 2021  Patient name: Aletha Mccord  : 1955  MRN: [id number]  Referring provider: Ofelia Hurd MD  Dx:   Encounter Diagnosis     ICD-10-CM    1  Lumbar radiculopathy  M54 16    2  Right shoulder pain, unspecified chronicity  M25 511    3  S/P rotator cuff surgery  Z98 [id number]        Start Time: 1040  Stop Time: 1145  Total time in clinic (min): 65 minutes    Assessment  Assessment details: Aletha Mccord is a 76 y o  female who presents with lumbar pain, limited AROM, and decreased strength in core and LEs  Due to these impairments, Patient has difficulty performing a/iadls and recreational activities including walking for exercise, gardening and photography  Patient's clinical presentation is consistent with their referring diagnosis of lumbar radiculopathy  She is generally deconditioned and inactive due to chronic pain  She is currently being seen Iin this office for PT s/p right shoulder surgery  Patient could benefit from a trial of skilled physical therapy to address their aforementioned impairments, improve their level of function and to improve their overall quality of life  Focus initially on right shoulder due to immediate nature of surgery and need for rehabilitation  She could benefit from a mobility and strengthening program for the LB once shoulder rehabilitation progresses  Impairments: abnormal or restricted ROM, activity intolerance, impaired physical strength, lacks appropriate home exercise program, pain with function, poor posture  and poor body mechanics  Understanding of Dx/Px/POC: good   Prognosis: good    Goals  ST-3 WEEKS  1  Decrease pain by 2 points on VAS at its worst   2   Increase ROM lumbar spine in all planes with decreased pain through ROM  3  Increase LE and core strength by 1/2 MMT grade in all deficient planes  LT-8 WEEKS  1   Patient to be independent with a/iadls including increased tolerance for meal preparation and grocery shopping  2  Increase functional activities for leisure with increased walking tolerance to better return to exercise  3  Independent with HEP and/or fitness program     Plan  Patient would benefit from: skilled physical therapy  Planned modality interventions: cryotherapy, thermotherapy: hydrocollator packs and unattended electrical stimulation  Planned therapy interventions: aquatic therapy, home exercise program, joint mobilization, manual therapy, neuromuscular re-education, patient education, postural training, therapeutic activities and therapeutic exercise  Frequency: 1-2 times per week  Duration in weeks: 8  Plan of Care beginning date: 2021  Plan of Care expiration date: 10/24/2021  Treatment plan discussed with: patient        Subjective Evaluation    History of Present Illness  Mechanism of injury: Long standing history of low back pain with sciatica symptoms  Lumbar surgery 2018 for discectomy with some relief  Notes recent increase in low back pain  Notes difficulty doing daily activities  Spouse needs to help with IADLs due to pain  No longer able to shop or go for walks for exercise  Enjoyed gardening and photography andis limited due to back pain  Pain  Current pain ratin  At best pain ratin  At worst pain ratin  Location: low back rigth greater than left wit hright LE radicular pain generally ot knee; right hip  Quality: burning, dull ache and sharp  Relieving factors: change in position and rest  Aggravating factors: standing and walking    Social Support  Lives with: spouse    Patient Goals  Patient goals for therapy: decreased pain, increased motion and return to sport/leisure activities  Patient goal: return ot walking for exercise        Objective     Static Posture   General Observations  Asymmetrical weight bearing  Head  Forward  Shoulders  Rounded  Lumbar Spine   Flattened  Pelvis   Pelvis (Right): Elevated       Palpation   Left   Hypertonic in the erector spinae and quadratus lumborum  Tenderness of the quadratus lumborum  Right   Hypertonic in the erector spinae and quadratus lumborum  Tenderness of the erector spinae and quadratus lumborum  Tenderness     Lumbar Spine  Tenderness in the spinous process (L5)  Left Hip   Tenderness in the PSIS  Right Hip   Tenderness in the PSIS  Neurological Testing     Sensation     Lumbar   Left   Intact: light touch  Paresthesia: light touch    Right   Intact: light touch  Paresthesia: light touch    Comments   Right light touch: lateral foot    Active Range of Motion     Lumbar   Flexion: 55 degrees   Extension: 15 degrees  with pain  Left lateral flexion: 20 degrees       Right lateral flexion: 15 degrees  with pain    Strength/Myotome Testing     Left Hip   Planes of Motion   Flexion: 4  Abduction: 4  Adduction: 4    Right Hip   Planes of Motion   Flexion: 4-  Abduction: 4-  Adduction: 4-    Left Knee   Flexion: 4  Extension: 4    Right Knee   Flexion: 4  Extension: 4-    General Comments:      Hip Comments   History of right hip bursitis  Tender to palpation right lateral hip/greater trochanter  Mild tenderness left  Knee Comments  History bilateral knee surgery     Pain right knee with AROM with ROM WFL          Precautions: HTN, FMS      Manuals 7/7 7/22 7/26          PROM R shoulder  15 15          LB/LE   8                                    Neuro Re-Ed                                                                                                        Ther Ex             AROM distal R UE 10x            scap pinch 10x 20x           Cervical AROM 5x 5x           pendulum   3' 5' 3'          Ball AA table slides/ CW/CCW  Yellow  30x 30x  each          pulley  nv 3'           Sub max isometrics - FF, EXT, IR/ER   3"/10x each                       Ther Activity                                       Gait Training                                       Modalities             CP post  10'

## 2021-07-28 NOTE — PROGRESS NOTES
Daily Note     Today's date: 2021  Patient name: Mario Leyva  : 1955  MRN: [id number]  Referring provider: Raymond Huang MD  Dx:   Encounter Diagnosis     ICD-10-CM    1  Lumbar radiculopathy  M54 16    2  Right shoulder pain, unspecified chronicity  M25 511    3  S/P rotator cuff surgery  Z98 [id number]        Start Time: 0200  Stop Time: 0300  Total time in clinic (min): 60 minutes    Subjective: Patient notes back felt much better after initial session last visit with stretching  Notes lateral right upper arm pain at 6/10  Patient notes lifting a watermelon at waist height with both arms  Notes she is still unable to sleep in bed due to shoulder/arm pain  Objective: See treatment diary below      Assessment: Tolerated treatment well  Improving PROM right shoulder in all planes  Cautioned patient regarding healing repair and contraindication of AROM and lifting with right UE  Plan: Continue per plan of care        Precautions: HTN, FMS  DOS:21 - no PT for initial 3 weeks post op            Manuals          PROM R shoulder  15 15 12         LB/LE   8 8                                   Neuro Re-Ed                                                                                                        Ther Ex             gripper 10x   White  30x         Wrist PRE    1#/30x         scap pinch 10x 20x           Cervical AROM 5x 5x  D/C to HEP         pendulum   3' 5' 3' 3'         Ball AA table slides/ CW/CCW  Yellow  30x 30x  each 30x  Each (big yellow)         pulley  nv 3'  FF 3'         Sub max isometrics - FF, EXT, IR/ER   3"/10x each 20x  each         Wand AA Flexion    nv         UBE (no resistance)    nv if alethea         Ther Activity                                       Gait Training                                       Modalities             CP post  10' 10 10 10

## 2021-07-29 ENCOUNTER — OFFICE VISIT (OUTPATIENT)
Dept: PHYSICAL THERAPY | Age: 66
End: 2021-07-29
Payer: MEDICARE

## 2021-07-29 DIAGNOSIS — M25.511 RIGHT SHOULDER PAIN, UNSPECIFIED CHRONICITY: ICD-10-CM

## 2021-07-29 DIAGNOSIS — M54.16 LUMBAR RADICULOPATHY: Primary | ICD-10-CM

## 2021-07-29 DIAGNOSIS — Z98.890 S/P ROTATOR CUFF SURGERY: ICD-10-CM

## 2021-07-29 PROCEDURE — 97110 THERAPEUTIC EXERCISES: CPT | Performed by: PHYSICAL THERAPIST

## 2021-07-29 PROCEDURE — 97140 MANUAL THERAPY 1/> REGIONS: CPT | Performed by: PHYSICAL THERAPIST

## 2021-08-04 ENCOUNTER — OFFICE VISIT (OUTPATIENT)
Dept: PHYSICAL THERAPY | Age: 66
End: 2021-08-04
Payer: MEDICARE

## 2021-08-04 DIAGNOSIS — M54.16 LUMBAR RADICULOPATHY: Primary | ICD-10-CM

## 2021-08-04 DIAGNOSIS — Z98.890 S/P ROTATOR CUFF SURGERY: ICD-10-CM

## 2021-08-04 DIAGNOSIS — M25.511 RIGHT SHOULDER PAIN, UNSPECIFIED CHRONICITY: ICD-10-CM

## 2021-08-04 PROCEDURE — 97110 THERAPEUTIC EXERCISES: CPT

## 2021-08-04 PROCEDURE — 97140 MANUAL THERAPY 1/> REGIONS: CPT

## 2021-08-04 NOTE — PROGRESS NOTES
Daily Note     Today's date: 2021  Patient name: Sally Piper  : 1955  MRN: [id number]  Referring provider: Jasen Galvez MD  Dx:   Encounter Diagnosis     ICD-10-CM    1  Lumbar radiculopathy  M54 16    2  Right shoulder pain, unspecified chronicity  M25 511    3  S/P rotator cuff surgery  Z98 890                   Subjective: Patient reports shoulder pain which has been causing disturbed sleep since surgery  Deltoid pain present  Continued LBP reported following prolonged postioning  Objective: See treatment diary below      Assessment: Tolerated treatment well  R shoulder ROM progressing at this time  Reviewed current protocol guidelines and adherence for protection of the repair and holding form AROM  Patient would benefit from continued PT  Plan: Continue per plan of care        Precautions: HTN, FMS  DOS:21 - no PT for initial 3 weeks post op            Manuals  8/4        PROM R shoulder  15 15 12 12'        LB/LE   8 8 10'                                  Neuro Re-Ed                                                                                                        Ther Ex             gripper 10x   White  30x White 30x        Wrist PRE    1#/30x 1#/30xea        scap pinch 10x 20x   20x        Cervical AROM 5x 5x  D/C to HEP         pendulum   3' 5' 3' 3' 3'        Ball AA table slides/ CW/CCW  Yellow  30x 30x  each 30x  Each (big yellow) 30x ea (big yellow)        pulley  nv 3'  FF 3' FF 3'        Sub max isometrics - FF, EXT, IR/ER   3"/10x each 20x  each         Wand AA Flexion    10x         UBE (no resistance)    nv if alethea         Ther Activity                                       Gait Training                                       Modalities             CP post  10' 10 10 10 10'

## 2021-08-06 ENCOUNTER — OFFICE VISIT (OUTPATIENT)
Dept: PHYSICAL THERAPY | Age: 66
End: 2021-08-06
Payer: MEDICARE

## 2021-08-06 DIAGNOSIS — M25.511 RIGHT SHOULDER PAIN, UNSPECIFIED CHRONICITY: ICD-10-CM

## 2021-08-06 DIAGNOSIS — M54.16 LUMBAR RADICULOPATHY: Primary | ICD-10-CM

## 2021-08-06 DIAGNOSIS — Z98.890 S/P ROTATOR CUFF SURGERY: ICD-10-CM

## 2021-08-06 PROCEDURE — 97110 THERAPEUTIC EXERCISES: CPT

## 2021-08-06 NOTE — PROGRESS NOTES
Daily Note     Today's date: 2021  Patient name: Marleni Estes  : 1955  MRN: [id number]  Referring provider: Ozzie Grace MD  Dx:   Encounter Diagnosis     ICD-10-CM    1  Lumbar radiculopathy  M54 16    2  Right shoulder pain, unspecified chronicity  M25 511    3  S/P rotator cuff surgery  Z98 890        Start Time: 1300  Stop Time: 4922  Total time in clinic (min): 45 minutes    Subjective: Reports pain at her R shoulder at a 6/10  States she think she may have overdone it at home  Difficulty sleeping at night, tends to put her hands up behind her head when she is sleeping and awakes with pain  Objective: See treatment diary below      Assessment: Tolerated treatment well  Guarded with PROM of R shoulder, some improvement with VC's  Pain at end of available ROM in flexion when returning to extension  Cues for carryover of exercises and monitoring to ensure proper quantity of exercises are completed  Patient would benefit from continued PT  Plan: Continue per plan of care        Precautions: HTN, FMS  DOS:21 - no PT for initial 3 weeks post op            Manuals  8//6       PROM R shoulder  15 15 12 12' 10'       LB/LE   8 8 10' 10'                                 Neuro Re-Ed                                                                                                        Ther Ex             gripper 10x   White  30x White 30x White 30x        Wrist PRE    1#/30x 1#/30xea 1# 30x ea        scap pinch 10x 20x   20x 20x       Cervical AROM 5x 5x  D/C to HEP         pendulum   3' 5' 3' 3' 3' 3'       Ball AA table slides/ CW/CCW  Yellow  30x 30x  each 30x  Each (big yellow) 30x ea (big yellow) 30x ea        pulley  nv 3'  FF 3' FF 3' FF 3'        Sub max isometrics - FF, EXT, IR/ER   3"/10x each 20x  each  5''x10 ea       Wand AA Flexion    10x         UBE (no resistance)    nv if alethea         Ther Activity                                       Gait Training Modalities             CP post  10' 10 10 10 10' 10'

## 2021-08-11 ENCOUNTER — OFFICE VISIT (OUTPATIENT)
Dept: PHYSICAL THERAPY | Age: 66
End: 2021-08-11
Payer: MEDICARE

## 2021-08-11 DIAGNOSIS — M54.16 LUMBAR RADICULOPATHY: Primary | ICD-10-CM

## 2021-08-11 DIAGNOSIS — M25.511 RIGHT SHOULDER PAIN, UNSPECIFIED CHRONICITY: ICD-10-CM

## 2021-08-11 DIAGNOSIS — Z98.890 S/P ROTATOR CUFF SURGERY: ICD-10-CM

## 2021-08-11 PROCEDURE — 97140 MANUAL THERAPY 1/> REGIONS: CPT | Performed by: PHYSICAL THERAPIST

## 2021-08-11 PROCEDURE — 97110 THERAPEUTIC EXERCISES: CPT | Performed by: PHYSICAL THERAPIST

## 2021-08-11 NOTE — PROGRESS NOTES
Daily Note     Today's date: 2021  Patient name: Joanna Menard  : 1955  MRN: [id number]  Referring provider: Robert Gutiérrez MD  Dx:   Encounter Diagnosis     ICD-10-CM    1  Lumbar radiculopathy  M54 16    2  Right shoulder pain, unspecified chronicity  M25 511    3  S/P rotator cuff surgery  Z98 890        Start Time: 06  Stop Time: 07  Total time in clinic (min): 50 minutes    Subjective: Patient notes burning pain low back and right shoulder pain  Saw surgeon yesterday who advised okay for AROM but no lifting with weight  LBP 6/10 and right shoulder 5/10  Notes continued pain with sleeping  Still sleeping in a recliner  Objective: See treatment diary below      Assessment: Tolerated treatment well  Patient would benefit from continued PT Increased program per protocol with good tolerance  Continued guarding with PROM  Plan: Continue per plan of care        Precautions: HTN, FMS  DOS:21 - no PT for initial 3 weeks post op            Manuals       PROM R shoulder  15 15 12 12' 10' 10'      LB/LE   8 8 10' 10' 10'                                Neuro Re-Ed                                                                                                        Ther Ex             gripper 10x   White  30x White 30x White 30x        Wrist PRE    1#/30x 1#/30xea 1# 30x ea  1#/30x      scap pinch 10x 20x   20x 20x 20x      Cervical AROM 5x 5x  D/C to HEP         pendulum   3' 5' 3' 3' 3' 3' 3'      Ball AA table slides/ CW/CCW  Yellow  30x 30x  each 30x  Each (big yellow) 30x ea (big yellow) 30x ea  30x                   pulley  nv 3'  FF 3' FF 3' FF 3'  3'      Sub max isometrics - FF, EXT, IR/ER   3"/10x each 20x  each  5''x10 ea 20x      Wand AA Flexion/cp    10x   20x      UBE (no resistance)    nv if alethea   4'  2F2B      SL ER       20x      TB row       RTB  20x      TB ext       RTB  20x      AROM FF 90degrees       5x      Ther Activity Gait Training                                       Modalities             CP post  10' 10 10 10 10' 10' 10

## 2021-08-13 ENCOUNTER — OFFICE VISIT (OUTPATIENT)
Dept: PHYSICAL THERAPY | Age: 66
End: 2021-08-13
Payer: MEDICARE

## 2021-08-13 DIAGNOSIS — M25.511 RIGHT SHOULDER PAIN, UNSPECIFIED CHRONICITY: ICD-10-CM

## 2021-08-13 DIAGNOSIS — Z98.890 S/P ROTATOR CUFF SURGERY: ICD-10-CM

## 2021-08-13 DIAGNOSIS — M54.16 LUMBAR RADICULOPATHY: Primary | ICD-10-CM

## 2021-08-13 PROCEDURE — 97140 MANUAL THERAPY 1/> REGIONS: CPT

## 2021-08-13 PROCEDURE — 97110 THERAPEUTIC EXERCISES: CPT

## 2021-08-13 NOTE — PROGRESS NOTES
Daily Note     Today's date: 2021  Patient name: Jeff Barr  : 1955  MRN: [id number]  Referring provider: Igor Carey MD  Dx:   Encounter Diagnosis     ICD-10-CM    1  Lumbar radiculopathy  M54 16    2  Right shoulder pain, unspecified chronicity  M25 511    3  S/P rotator cuff surgery  Z98 890                   Subjective: Patient reports that her R shoulder is a 6/10 at rest and a 7-8/10 when she uses it  Notes that her low back is also a 7/10 on both sides with radiating symptoms into her R glute stopping at her buttocks  Objective: See treatment diary below      Assessment: Pt arrived 13 min late to session with accommodations made  Pt required cues to relax during PROM as well as to promote functional form with exercises  Tolerated treatment well  Patient would benefit from continued PT  Pt felt looser to end with improved shoulder ROM  Plan: Continue per plan of care        Precautions: HTN, FMS  DOS:21 - no PT for initial 3 weeks post op      Manuals      PROM R shoulder  15 15 12 12' 10' 10' 10     LB/LE   8 8 10' 10' 10' 10'                               Neuro Re-Ed                                                                                                        Ther Ex             gripper 10x   White  30x White 30x White 30x        Wrist PRE    1#/30x 1#/30xea 1# 30x ea  1#/30x 1#/ 30x     scap pinch 10x 20x   20x 20x 20x 20x     Cervical AROM 5x 5x  D/C to HEP         pendulum   3' 5' 3' 3' 3' 3' 3' 3'     Ball AA table slides/ CW/CCW  Yellow  30x 30x  each 30x  Each (big yellow) 30x ea (big yellow) 30x ea  30x 30x                   pulley  nv 3'  FF 3' FF 3' FF 3'  3' 3'     Sub max isometrics - FF, EXT, IR/ER   3"/10x each 20x  each  5''x10 ea 20x 20x     Wand AA Flexion/cp    10x   20x 20x     UBE (no resistance)    nv if alethea   4'  2F2B 4' 2F2B     SL ER       20x 20x     TB row       RTB  20x RTB 20x     TB ext RTB  20x RTB x20     AROM FF 90degrees       5x 5x     Ther Activity                                       Gait Training                                       Modalities             CP post  10' 10 10 10 10' 10' 10 10

## 2021-08-18 ENCOUNTER — OFFICE VISIT (OUTPATIENT)
Dept: PHYSICAL THERAPY | Age: 66
End: 2021-08-18
Payer: MEDICARE

## 2021-08-18 DIAGNOSIS — Z98.890 S/P ROTATOR CUFF SURGERY: ICD-10-CM

## 2021-08-18 DIAGNOSIS — M25.511 RIGHT SHOULDER PAIN, UNSPECIFIED CHRONICITY: ICD-10-CM

## 2021-08-18 DIAGNOSIS — M54.16 LUMBAR RADICULOPATHY: Primary | ICD-10-CM

## 2021-08-18 PROCEDURE — 97140 MANUAL THERAPY 1/> REGIONS: CPT | Performed by: PHYSICAL THERAPIST

## 2021-08-18 PROCEDURE — 97110 THERAPEUTIC EXERCISES: CPT | Performed by: PHYSICAL THERAPIST

## 2021-08-18 NOTE — PROGRESS NOTES
Daily Note     Today's date: 2021  Patient name: Eulalia Krishnan  : 1955  MRN: [id number]  Referring provider: Alma Layne MD  Dx:   Encounter Diagnosis     ICD-10-CM    1  Lumbar radiculopathy  M54 16    2  Right shoulder pain, unspecified chronicity  M25 511    3  S/P rotator cuff surgery  Z98 890        Start Time: 1245  Stop Time: 1330  Total time in clinic (min): 45 minutesArrived 20 minutes late for scheduled appointment  Subjective: Patient notes she turned over in bed two nights ago and felt pop in right shoulder with increased pain  Notes she has been having some "catching" in shoulder since  Notes 4/10 shoulder pain upon arrival to PT today with pain generally throughout shoulder  Notes she attempted to lift her weed theo and had increased pain  Objective: See treatment diary below      Assessment: Tolerated treatment well  Patient would benefit from continued PT Modified program due to time constraint/late arrival  Functional PROM in all planes with pain at end ranges  Cautioned regarding healing repair and avoiding lifting with R UE  Plan: Continue per plan of care        Precautions: HTN, FMS  DOS:21 - no PT for initial 3 weeks post op      Manuals     PROM R shoulder  15 15 12 12' 10' 10' 10 10    LB/LE   8 8 10' 10' 10' 10' nt                              Neuro Re-Ed                                                                                                        Ther Ex             gripper 10x   White  30x White 30x White 30x        Wrist PRE    1#/30x 1#/30xea 1# 30x ea  1#/30x 1#/ 30x     scap pinch 10x 20x   20x 20x 20x 20x 20x    pendulum   3' 5' 3' 3' 3' 3' 3' 3' AH    Ball AA table slides/ CW/CCW  Yellow  30x 30x  each 30x  Each (big yellow) 30x ea (big yellow) 30x ea  30x 30x  30x                 pulley  nv 3'  FF 3' FF 3' FF 3'  3' 3' 3'    Sub max isometrics - FF, EXT, IR/ER   3"/10x each 20x  each  5''x10 ea 20x 20x 30x    Wand AA Flexion/cp    10x   20x 20x 30x  each    UBE (no resistance)    nv if alethea   4'  2F2B 4' 2F2B 4'    SL ER       20x 20x     TB row       RTB  20x RTB 20x RTB  30x    TB ext       RTB  20x RTB x20 RTB  30x    AROM FF 90degrees       5x 5x 10x    Ther Activity                                       Gait Training                                       Modalities             CP post  10' 10 10 10 10' 10' 10 10 10

## 2021-08-20 ENCOUNTER — APPOINTMENT (OUTPATIENT)
Dept: PHYSICAL THERAPY | Age: 66
End: 2021-08-20
Payer: MEDICARE

## 2021-08-22 NOTE — PROGRESS NOTES
Daily Note     Today's date: 2021  Patient name: Mando Teixeira  : 1955  MRN: [id number]  Referring provider: Jeannette Barbour MD  Dx:   Encounter Diagnosis     ICD-10-CM    1  Lumbar radiculopathy  M54 16    2  Right shoulder pain, unspecified chronicity  M25 511    3  S/P rotator cuff surgery  Z98 890        Start Time: 1245  Stop Time: 0570  Total time in clinic (min): 60 minutes    Subjective: 4/10 general right shoulder pain  Reports using her arm more than she should  Still with interrupted sleep and sleeping in a recliner  Notes a "catching" with elevation greater than 120 degrees  Objective: See treatment diary below      Assessment: Tolerated treatment well  Patient would benefit from continued PT  AROM to 90 with decreased substitution and less pain  Painful arc at 120 degrees  Functional PROM in IR/ER with end range pain in FF and ABD  Plan: Continue per plan of care   advance per protocol     Precautions: HTN, FMS  DOS:21 - no PT for initial 3 weeks post op      Manuals    PROM R shoulder  15 15 12 12' 10' 10' 10 10 10   LB/LE   8 8 10' 10' 10' 10' nt nt                             Neuro Re-Ed                                                                                                        Ther Ex             gripper 10x   White  30x White 30x White 30x        Wrist PRE    1#/30x 1#/30xea 1# 30x ea  1#/30x 1#/ 30x  30   scap pinch 10x 20x   20x 20x 20x 20x 20x 20x   pendulum   3' 5' 3' 3' 3' 3' 3' 3' AH AH   Ball AA table slides/ CW/CCW  Yellow  30x 30x  each 30x  Each (big yellow) 30x ea (big yellow) 30x ea  30x 30x  30x 30x                pulley  nv 3'  FF 3' FF 3' FF 3'  3' 3' 3' 3'   Sub max isometrics - FF, EXT, IR/ER   3"/10x each 20x  each  5''x10 ea 20x 20x 30x 30x   Wand AA Flexion/cp    10x   20x 20x 30x  each 30x   UBE (no resistance)    nv if alethea   4'  2F2B 4' 2F2B 4' 5'   SL ER       20x 20x     TB row RTB  20x RTB 20x RTB  30x 30x   TB ext       RTB  20x RTB x20 RTB  30x 30x   AROM FF 90degrees       5x 5x 10x 10x   Ther Activity                                       Gait Training                                       Modalities             CP post  10' 10 10 10 10' 10' 10 10 10 10

## 2021-08-23 ENCOUNTER — OFFICE VISIT (OUTPATIENT)
Dept: PHYSICAL THERAPY | Age: 66
End: 2021-08-23
Payer: MEDICARE

## 2021-08-23 DIAGNOSIS — M25.511 RIGHT SHOULDER PAIN, UNSPECIFIED CHRONICITY: ICD-10-CM

## 2021-08-23 DIAGNOSIS — Z98.890 S/P ROTATOR CUFF SURGERY: ICD-10-CM

## 2021-08-23 DIAGNOSIS — M54.16 LUMBAR RADICULOPATHY: Primary | ICD-10-CM

## 2021-08-23 PROCEDURE — 97140 MANUAL THERAPY 1/> REGIONS: CPT | Performed by: PHYSICAL THERAPIST

## 2021-08-23 PROCEDURE — 97110 THERAPEUTIC EXERCISES: CPT | Performed by: PHYSICAL THERAPIST

## 2021-08-25 NOTE — PROGRESS NOTES
PT Re-Evaluation     Today's date: 2021  Patient name: Aidee Monterroso  : 1955  MRN: [id number]  Referring provider: Leslee Stallings MD  Dx:   Encounter Diagnosis     ICD-10-CM    1  Lumbar radiculopathy  M54 16    2  Right shoulder pain, unspecified chronicity  M25 511    3   S/P rotator cuff surgery  Z98 890                   Assessment/Plan    Subjective    Objective                 Precautions: HTN, FMS  DOS:21 - no PT for initial 3 weeks post op      Manuals    PROM R shoulder  15 15 12 12' 10' 10' 10 10 10   LB/LE   8 8 10' 10' 10' 10' nt nt                             Neuro Re-Ed                                                                                                        Ther Ex             gripper 10x   White  30x White 30x White 30x        Wrist PRE    1#/30x 1#/30xea 1# 30x ea  1#/30x 1#/ 30x  30   scap pinch 10x 20x   20x 20x 20x 20x 20x 20x   pendulum   3' 5' 3' 3' 3' 3' 3' 3' AH AH   Ball AA table slides/ CW/CCW  Yellow  30x 30x  each 30x  Each (big yellow) 30x ea (big yellow) 30x ea  30x 30x  30x 30x                pulley  nv 3'  FF 3' FF 3' FF 3'  3' 3' 3' 3'   Sub max isometrics - FF, EXT, IR/ER   3"/10x each 20x  each  5''x10 ea 20x 20x 30x 30x   Wand AA Flexion/cp    10x   20x 20x 30x  each 30x   UBE (no resistance)    nv if alethea   4'  2F2B 4' 2F2B 4' 5'   SL ER       20x 20x     TB row       RTB  20x RTB 20x RTB  30x 30x   TB ext       RTB  20x RTB x20 RTB  30x 30x   AROM FF 90degrees       5x 5x 10x 10x   Ther Activity                                       Gait Training                                       Modalities             CP post  10' 10 10 10 10' 10' 10 10 10 10

## 2021-08-26 ENCOUNTER — APPOINTMENT (OUTPATIENT)
Dept: PHYSICAL THERAPY | Age: 66
End: 2021-08-26
Payer: MEDICARE

## 2021-08-27 ENCOUNTER — APPOINTMENT (OUTPATIENT)
Dept: PHYSICAL THERAPY | Age: 66
End: 2021-08-27
Payer: MEDICARE

## 2021-08-30 ENCOUNTER — APPOINTMENT (OUTPATIENT)
Dept: PHYSICAL THERAPY | Age: 66
End: 2021-08-30
Payer: MEDICARE

## 2021-09-02 ENCOUNTER — EVALUATION (OUTPATIENT)
Dept: PHYSICAL THERAPY | Age: 66
End: 2021-09-02
Payer: MEDICARE

## 2021-09-02 DIAGNOSIS — Z98.890 S/P ROTATOR CUFF SURGERY: ICD-10-CM

## 2021-09-02 DIAGNOSIS — M25.511 RIGHT SHOULDER PAIN, UNSPECIFIED CHRONICITY: Primary | ICD-10-CM

## 2021-09-02 PROCEDURE — 97110 THERAPEUTIC EXERCISES: CPT | Performed by: PHYSICAL THERAPIST

## 2021-09-02 PROCEDURE — 97140 MANUAL THERAPY 1/> REGIONS: CPT | Performed by: PHYSICAL THERAPIST

## 2021-09-02 NOTE — LETTER
2021    Gretel Carey MD  [address] [address]    Patient: Hang Conte   YOB: 1955   Date of Visit: 2021     Encounter Diagnosis     ICD-10-CM    1  Right shoulder pain, unspecified chronicity  M25 511    2  S/P rotator cuff surgery  Z98 890        Dear Dr Tyson Sharma:    Thank you for your recent referral of Hang Conte  Please review the attached evaluation summary from Paulina's recent visit  Please verify that you agree with the plan of care by signing the attached order  If you have any questions or concerns, please do not hesitate to call  I sincerely appreciate the opportunity to share in the care of one of your patients and hope to have another opportunity to work with you in the near future  Sincerely,    Kadi Breaux, PT      Referring Provider:      I certify that I have read the below Plan of Care and certify the need for these services furnished under this plan of treatment while under my care  Gretel Carey MD  [address] [address]  Via Fax: [phone]          PT Re-Evaluation     Today's date: 2021  Patient name: Hang Conte  : 1955  MRN: [id number]  Referring provider: Amber Lorenzo MD  Dx:   Encounter Diagnosis     ICD-10-CM    1  Right shoulder pain, unspecified chronicity  M25 511    2  S/P rotator cuff surgery  Z98 890        Start Time: 1300  Stop Time: 8929  Total time in clinic (min): 55 minutes    Assessment  Assessment details: Hang Conte is a 76 y o  female who presents with pain, decreased strength, decreased ROM and decreased joint mobility  Due to these impairments, Patient has difficulty performing a/iadls, recreational activities and engaging in social activities  Patient's clinical presentation is consistent with their referring diagnosis of right shoulder pain s/p RTC repair   Patient is making slow steady gains in PT with increased PROM and AROM of right shoulder  Patient admits to using her arm early in her post surgical process  Continues to show painful arc with elevation above 90 degrees She could benefit from additional PT progressing strengthening per protocol to maximize strength and function of right UE  Impairments: abnormal or restricted ROM, activity intolerance, impaired physical strength, lacks appropriate home exercise program, pain with function, poor posture  and poor body mechanics  Understanding of Dx/Px/POC: good   Prognosis: good    Goals  ST-3 WEEKS  1  Decrease pain by 2-4 points on VAS at its worst  - progressingtowards  2  Increase ROM right shoulder to [facility] per post surgical protocol - met      LT-6 WEEKS  1  Patient to advance to AROM per protocol - met  2  Increase functional activities for light ADLs at home within protocol parameters - progressingtowards  3  Independent with HEP ongoing throughout [place] [address]  1  Pain less than 3/10 right shoulder  2  Sleep in bed 5 hours or greater uninterrupted by shoulder pain  3  >3/5 strength right shoulder musculature    Plan  Patient would benefit from: skilled physical therapy  Planned modality interventions: cryotherapy and thermotherapy: hydrocollator packs  Planned therapy interventions: activity modification, behavior modification, body mechanics training, functional ROM exercises, home exercise program, joint mobilization, manual therapy, neuromuscular re-education, patient education, postural training, therapeutic activities, therapeutic exercise and IADL retraining  Frequency: 2-3x week  Duration in weeks: 8  Plan of Care beginning date: 2021  Plan of Care expiration date: 2021  Treatment plan discussed with: patient        Subjective Evaluation    History of Present Illness  Date of surgery: 2021  Mechanism of injury: Right shoulder pain for some time  Progressively worse  [place] three times in the last 8 months  Hit shoulder on step     Prior surgery right shoulder for spur - greater than 10 years ago  PT  And injections with no relief  MRI was positive RTC tear  Under went surgery 21    Began therapy 3 weeks post op  Patient notes improvement in ROM and increased use of right UE with less difficulty  Continues with pain that ranges from 4-8/10 anterior/lateral shoulder and upper arm  Describes a "clicking" with AROM in elevation greater than 90  Quality of life: good    Pain  Current pain ratin  At best pain ratin  At worst pain ratin  Location: right anterior superior shoulder with tingling in to arm and some elbow/wrist discomfort  Quality: throbbing and tight  Relieving factors: rest  Aggravating factors: lifting and overhead activity    Hand dominance: ambidextrous    Patient Goals  Patient goals for therapy: decreased pain, increased strength, increased motion and independence with ADLs/IADLs          Objective     Palpation     Right   Hypertonic in the rhomboids  Tenderness of the biceps, pectoralis major and thoracic paraspinals       Neurological Testing     Sensation     Shoulder   Left Shoulder   Intact: light touch    Right Shoulder   Intact: light touch  Paresthesia: light touch    Active Range of Motion     Right Shoulder   Flexion: 100 degrees with pain  Abduction: 90 degrees with pain  External rotation BTH: C2   Internal rotation BTB: sacrum     Passive Range of Motion     Right Shoulder   Flexion: 140 degrees   Abduction: 120 degrees   External rotation 90°: 80 degrees   Internal rotation 90°: 55 degrees     Strength/Myotome Testing     Right Shoulder     Planes of Motion   Flexion: 3   Abduction: 3   External rotation at 0°: 3+   Internal rotation at 0°: 4-       Flowsheet Rows      Most Recent Value   PT/OT G-Codes   Current Score  46   Projected Score  47           Precautions: HTN, FMS  DOS:21 - no PT for initial 3 weeks post op      Manuals    PROM R shoulder 10 15 15 12 12' 10' 10' 10 10 10   LB/LE   8 8 10' 10' 10' 10' nt nt                             Neuro Re-Ed                                                                                                        Ther Ex             gripper    White  30x White 30x White 30x        Wrist PRE    1#/30x 1#/30xea 1# 30x ea  1#/30x 1#/ 30x  30   pendulum   3' 5' 3' 3' 3' 3' 3' 3' AH AH   Ball AA table slides/ CW/CCW AROM  30x Yellow  30x 30x  each 30x  Each (big yellow) 30x ea (big yellow) 30x ea  30x 30x  30x 30x                pulley 3' nv 3'  FF 3' FF 3' FF 3'  3' 3' 3' 3'   Wand AA Flexion/cp 20x   10x   20x 20x 30x  each 30x   Serratus punch/ circles Red  10x                         RTC RTB  30x each            UBE (no resistance) 5   nv if alethea   4'  2F2B 4' 2F2B 4' 5'   SL ER 30x      20x 20x     TB row 30x      RTB  20x RTB 20x RTB  30x 30x   TB ext 30x      RTB  20x RTB x20 RTB  30x 30x   AROM FF   pegs Blue  2 rows      5x 5x 10x 10x   Ther Activity                                       Gait Training                                       Modalities             CP post  10' 10 10 10 10' 10' 10 10 10 10

## 2021-09-02 NOTE — PROGRESS NOTES
PT Re-Evaluation     Today's date: 2021  Patient name: Robert Eddy  : 1955  MRN: [id number]  Referring provider: Tae Purvis MD  Dx:   Encounter Diagnosis     ICD-10-CM    1  Right shoulder pain, unspecified chronicity  M25 511    2  S/P rotator cuff surgery  Z98 890        Start Time: 1300  Stop Time: 8132  Total time in clinic (min): 55 minutes    Assessment  Assessment details: Robert Eddy is a 76 y o  female who presents with pain, decreased strength, decreased ROM and decreased joint mobility  Due to these impairments, Patient has difficulty performing a/iadls, recreational activities and engaging in social activities  Patient's clinical presentation is consistent with their referring diagnosis of right shoulder pain s/p RTC repair  Patient is making slow steady gains in PT with increased PROM and AROM of right shoulder  Patient admits to using her arm early in her post surgical process  Continues to show painful arc with elevation above 90 degrees She could benefit from additional PT progressing strengthening per protocol to maximize strength and function of right UE  Impairments: abnormal or restricted ROM, activity intolerance, impaired physical strength, lacks appropriate home exercise program, pain with function, poor posture  and poor body mechanics  Understanding of Dx/Px/POC: good   Prognosis: good    Goals  ST-3 WEEKS  1  Decrease pain by 2-4 points on VAS at its worst  - progressingtowards  2  Increase ROM right shoulder to [facility] per post surgical protocol - met      LT-6 WEEKS  1  Patient to advance to AROM per protocol - met  2  Increase functional activities for light ADLs at home within protocol parameters - progressingtowards  3  Independent with HEP ongoing throughout [place] [address]  1  Pain less than 3/10 right shoulder  2   Sleep in bed 5 hours or greater uninterrupted by shoulder pain  3  >3/5 strength right shoulder musculature    Plan  Patient would benefit from: skilled physical therapy  Planned modality interventions: cryotherapy and thermotherapy: hydrocollator packs  Planned therapy interventions: activity modification, behavior modification, body mechanics training, functional ROM exercises, home exercise program, joint mobilization, manual therapy, neuromuscular re-education, patient education, postural training, therapeutic activities, therapeutic exercise and IADL retraining  Frequency: 2-3x week  Duration in weeks: 8  Plan of Care beginning date: 2021  Plan of Care expiration date: 2021  Treatment plan discussed with: patient        Subjective Evaluation    History of Present Illness  Date of surgery: 2021  Mechanism of injury: Right shoulder pain for some time  Progressively worse  [place] three times in the last 8 months  Hit shoulder on step  Prior surgery right shoulder for spur - greater than 10 years ago  PT  And injections with no relief  MRI was positive RTC tear  Under went surgery 21    Began therapy 3 weeks post op  Patient notes improvement in ROM and increased use of right UE with less difficulty  Continues with pain that ranges from 4-8/10 anterior/lateral shoulder and upper arm  Describes a "clicking" with AROM in elevation greater than 90  Quality of life: good    Pain  Current pain ratin  At best pain ratin  At worst pain ratin  Location: right anterior superior shoulder with tingling in to arm and some elbow/wrist discomfort  Quality: throbbing and tight  Relieving factors: rest  Aggravating factors: lifting and overhead activity    Hand dominance: ambidextrous    Patient Goals  Patient goals for therapy: decreased pain, increased strength, increased motion and independence with ADLs/IADLs          Objective     Palpation     Right   Hypertonic in the rhomboids  Tenderness of the biceps, pectoralis major and thoracic paraspinals       Neurological Testing     Sensation     Shoulder   Left Shoulder Intact: light touch    Right Shoulder   Intact: light touch  Paresthesia: light touch    Active Range of Motion     Right Shoulder   Flexion: 100 degrees with pain  Abduction: 90 degrees with pain  External rotation BTH: C2   Internal rotation BTB: sacrum     Passive Range of Motion     Right Shoulder   Flexion: 140 degrees   Abduction: 120 degrees   External rotation 90°: 80 degrees   Internal rotation 90°: 55 degrees     Strength/Myotome Testing     Right Shoulder     Planes of Motion   Flexion: 3   Abduction: 3   External rotation at 0°: 3+   Internal rotation at 0°: 4-       Flowsheet Rows      Most Recent Value   PT/OT G-Codes   Current Score  46   Projected Score  47           Precautions: HTN, FMS  DOS:6/30/21 - no PT for initial 3 weeks post op      Manuals 9/2 7/22 7/26 7/29 8/4 8/6 8/11 8/13 8/18 8/23   PROM R shoulder 10 15 15 12 12' 10' 10' 10 10 10   LB/LE   8 8 10' 10' 10' 10' nt nt                             Neuro Re-Ed                                                                                                        Ther Ex             gripper    White  30x White 30x White 30x        Wrist PRE    1#/30x 1#/30xea 1# 30x ea  1#/30x 1#/ 30x  30   pendulum   3' 5' 3' 3' 3' 3' 3' 3' AH AH   Ball AA table slides/ CW/CCW AROM  30x Yellow  30x 30x  each 30x  Each (big yellow) 30x ea (big yellow) 30x ea  30x 30x  30x 30x                pulley 3' nv 3'  FF 3' FF 3' FF 3'  3' 3' 3' 3'   Wand AA Flexion/cp 20x   10x   20x 20x 30x  each 30x   Serratus punch/ circles Red  10x                         RTC RTB  30x each            UBE (no resistance) 5   nv if alethea   4'  2F2B 4' 2F2B 4' 5'   SL ER 30x      20x 20x     TB row 30x      RTB  20x RTB 20x RTB  30x 30x   TB ext 30x      RTB  20x RTB x20 RTB  30x 30x   AROM FF   pegs Blue  2 rows      5x 5x 10x 10x   Ther Activity                                       Gait Training                                       Modalities             CP post  10' 10 10 10 10' 10' 10 10 10 10

## 2021-09-09 ENCOUNTER — OFFICE VISIT (OUTPATIENT)
Dept: PHYSICAL THERAPY | Age: 66
End: 2021-09-09
Payer: MEDICARE

## 2021-09-09 DIAGNOSIS — Z98.890 S/P ROTATOR CUFF SURGERY: ICD-10-CM

## 2021-09-09 DIAGNOSIS — M25.511 RIGHT SHOULDER PAIN, UNSPECIFIED CHRONICITY: Primary | ICD-10-CM

## 2021-09-09 PROCEDURE — 97110 THERAPEUTIC EXERCISES: CPT | Performed by: PHYSICAL THERAPIST

## 2021-09-09 PROCEDURE — 97140 MANUAL THERAPY 1/> REGIONS: CPT | Performed by: PHYSICAL THERAPIST

## 2021-09-09 NOTE — PROGRESS NOTES
Daily Note     Today's date: 2021  Patient name: Sundeep Vigil  : 1955  MRN: [id number]  Referring provider: Diana Hernandez MD  Dx:   Encounter Diagnosis     ICD-10-CM    1  Right shoulder pain, unspecified chronicity  M25 511    2  S/P rotator cuff surgery  Z98 890        Start Time: 1310  Stop Time: 8614  Total time in clinic (min): 49 minutes    Subjective: Moderate right lateral arm pain with interrupted sleep  Using her arm for most things at home  "It just hurts "      Objective: See treatment diary below      Assessment: Tolerated treatment fair  Patient would benefit from continued PT End range tightness elevation FF and abduction that improves with prolonged stretch  TB exercises challenging  Verbal cues for technique and pacing  Moderate right scapular tightness with rhomboid spasm  Plan: Continue per plan of care  Progress treament per protocol          Precautions: HTN, FMS  DOS:21 - no PT for initial 3 weeks post op      Manuals    PROM R shoulder 10 10 15 12 12' 10' 10' 10 10 10   LB/LE   8 8 10' 10' 10' 10' nt nt                             Neuro Re-Ed                                                                                                        Ther Ex             gripper    White  30x White 30x White 30x        Wrist PRE    1#/30x 1#/30xea 1# 30x ea  1#/30x 1#/ 30x  30   pendulum   3'  3' 3' 3' 3' 3' 3' AH AH   Ball  table slides/ CW/CCW AROM  30x Yellow  30x 30x  each 30x  Each (big yellow) 30x ea (big yellow) 30x ea  30x 30x  30x 30x                pulley 3' 3' 3'  FF 3' FF 3' FF 3'  3' 3' 3' 3'   Wand AA Flexion/cp 20x 10"/10x  10x   20x 20x 30x  each 30x   Serratus punch/ circles Red  10x 10x                        RTC RTB  30x each 20x each           UBE (no resistance) 5   nv if alethea   4'  2F2B 4' 2F2B 4' 5'   SL ER 30x 30x     20x 20x     TB row 30x      RTB  20x RTB 20x RTB  30x 30x   TB ext 30x      RTB  20x RTB x20 RTB  30x 30x   AROM FF   pegs Blue  2 rows Yellow  2 rows     5x 5x 10x 10x   Ther Activity                                       Gait Training                                       Modalities             CP post  10' 10 10 10 10' 10' 10 10 10 10

## 2021-09-10 ENCOUNTER — OFFICE VISIT (OUTPATIENT)
Dept: PHYSICAL THERAPY | Age: 66
End: 2021-09-10
Payer: MEDICARE

## 2021-09-10 DIAGNOSIS — Z98.890 S/P ROTATOR CUFF SURGERY: ICD-10-CM

## 2021-09-10 DIAGNOSIS — M25.511 RIGHT SHOULDER PAIN, UNSPECIFIED CHRONICITY: Primary | ICD-10-CM

## 2021-09-10 PROCEDURE — 97140 MANUAL THERAPY 1/> REGIONS: CPT | Performed by: PHYSICAL THERAPIST

## 2021-09-10 PROCEDURE — 97110 THERAPEUTIC EXERCISES: CPT | Performed by: PHYSICAL THERAPIST

## 2021-09-10 NOTE — PROGRESS NOTES
Daily Note     Today's date: 9/10/2021  Patient name: Margaret Eagle  : 1955  MRN: [id number]  Referring provider: Anais Wilson MD  Dx:   Encounter Diagnosis     ICD-10-CM    1  Right shoulder pain, unspecified chronicity  M25 511    2  S/P rotator cuff surgery  Z98 890        Start Time: 1615  Stop Time: 1700  Total time in clinic (min): 45 minutes    Subjective: Notes neck and back pain with right UE pain that radiates to her wrist  Notes difficulty sleeping with right shoulder pain  Using R UE more for daily activities  Objective: See treatment diary below      Assessment: Tolerated treatment well  Patient would benefit from continued PT and advancing strengthening  Functional PROM right shoulder  Forward head posturing  Modified PT due to second day in row and 15 minute late arrival       Plan: Continue per plan of care        Precautions: HTN, FMS  DOS:21 - no PT for initial 3 weeks post op      Manuals 9/2 9/9 9/10 7/29 8/4 8/6 8/11 8/13 8/18 8/23   PROM R shoulder 10 10 10 12 12' 10' 10' 10 10 10   LB/LE   8 8 10' 10' 10' 10' nt nt                             Neuro Re-Ed                                                                                                        Ther Ex             Wrist PRE    1#/30x 1#/30xea 1# 30x ea  1#/30x 1#/ 30x  30   pendulum   3'  3' 3' 3' 3' 3' 3' AH AH   Ball  table slides/ CW/CCW AROM  30x Yellow  30x 30x  each 30x  Each wall 30x ea (big yellow) 30x ea  30x 30x  30x 30x                pulley 3' 3' 3'  FF 3' FF 3' FF 3'  3' 3' 3' 3'   Wand AA Flexion/cp 20x 10"/10x 10x 10x   20x 20x 30x  each 30x   Serratus punch/ circles Red  10x 10x 10x 10x         Roller up wall   10x 10x         RTC RTB  30x each 20x each           UBE (no resistance) 5  6' 6'   4'  2F2B 4' 2F2B 4' 5'   SL ER 30x 30x     20x 20x     TB row 30x      RTB  20x RTB 20x RTB  30x 30x   TB ext 30x      RTB  20x RTB x20 RTB  30x 30x   AROM FF   pegs Blue  2 rows Yellow  2 rows 2x Y 2 rows yellow   5x 5x 10x 10x   Ther Activity             Seated thor ext over 1/2 bolster   5x 5x                      Gait Training                                       Modalities             CP post  10' 10 10 10 10' 10' 10 10 10 10

## 2021-09-15 ENCOUNTER — APPOINTMENT (OUTPATIENT)
Dept: PHYSICAL THERAPY | Age: 66
End: 2021-09-15
Payer: MEDICARE

## 2021-09-17 ENCOUNTER — APPOINTMENT (OUTPATIENT)
Dept: PHYSICAL THERAPY | Age: 66
End: 2021-09-17
Payer: MEDICARE

## 2021-09-19 NOTE — PROGRESS NOTES
Daily Note     Today's date: 2021  Patient name: Jovanna Perez  : 1955  MRN: [id number]  Referring provider: Diogo Kline MD  Dx:   Encounter Diagnosis     ICD-10-CM    1  Right shoulder pain, unspecified chronicity  M25 511    2  S/P rotator cuff surgery  Z98 [id number]        Start Time: 1015  Stop Time: 1100  Total time in clinic (min): 45 minutes    Subjective: Patient notes she was seen by surgeon who recommended continued PT  Reports pain persists lateral shoulder  Notes she is using her arm for everything now  Continued interrupted sleep      Objective: See treatment diary below      Assessment: Tolerated treatment fair  Patient would benefit from continued PT Increased exercises with good tolerance  Verbal cues for pacing and technique  Encouraged daily stretching  Moderate scapular tightness with palpable spasm  Plan: Continue per plan of care        Precautions: HTN, FMS  DOS:21 - no PT for initial 3 weeks post op      Manuals 9/2 9/9 9/10 9/20 8/4 8/6 8/11 8/13 8/18 8/23   PROM R shoulder 10 10 10 12 12' 10' 10' 10 10 10   LB/LE   8  10' 10' 10' 10' nt nt                             Neuro Re-Ed                                                                                                        Ther Ex             pendulum   3'  3' 3' 3' 3' 3' 3' AH AH   Ball  table slides/ CW/CCW AROM  30x Yellow  30x 30x  each 30x  Each wall 30x ea (big yellow) 30x ea  30x 30x  30x 30x                pulley 3' 3' 3'  FF 3' FF 3' FF 3'  3' 3' 3' 3'   Wand AA Flexion/cp 20x 10"/10x 10x 10x   20x 20x 30x  each 30x   Serratus punch/ circles Red  10x 10x 10x 10x         Roller up wall   10x 10x         RTC RTB  30x each 20x each  RTB  30x         UBE (no resistance) 5  6' 6'   4'  2F2B 4' 2F2B 4' 5'   SL ER 30x 30x  30x   20x 20x     TB row 30x   BTB  30x   RTB  20x RTB 20x RTB  30x 30x   TB ext 30x   btb  30x   RTB  20x RTB x20 RTB  30x 30x   AROM FF   pegs Blue  2 rows Yellow  2 rows 2x Y 2 rows yellow 5x 5x 10x 10x   Ther Activity             Seated thor ext over 1/2 bolster   5x                       Gait Training                                       Modalities             CP post  10' 10 10 10 10' 10' 10 10 10 10

## 2021-09-20 ENCOUNTER — OFFICE VISIT (OUTPATIENT)
Dept: PHYSICAL THERAPY | Age: 66
End: 2021-09-20
Payer: MEDICARE

## 2021-09-20 DIAGNOSIS — M25.511 RIGHT SHOULDER PAIN, UNSPECIFIED CHRONICITY: Primary | ICD-10-CM

## 2021-09-20 DIAGNOSIS — Z98.890 S/P ROTATOR CUFF SURGERY: ICD-10-CM

## 2021-09-20 PROCEDURE — 97140 MANUAL THERAPY 1/> REGIONS: CPT | Performed by: PHYSICAL THERAPIST

## 2021-09-20 PROCEDURE — 97110 THERAPEUTIC EXERCISES: CPT | Performed by: PHYSICAL THERAPIST

## 2021-09-23 ENCOUNTER — OFFICE VISIT (OUTPATIENT)
Dept: PHYSICAL THERAPY | Age: 66
End: 2021-09-23
Payer: MEDICARE

## 2021-09-23 DIAGNOSIS — Z98.890 S/P ROTATOR CUFF SURGERY: ICD-10-CM

## 2021-09-23 DIAGNOSIS — M25.511 RIGHT SHOULDER PAIN, UNSPECIFIED CHRONICITY: Primary | ICD-10-CM

## 2021-09-23 PROCEDURE — 97140 MANUAL THERAPY 1/> REGIONS: CPT | Performed by: PHYSICAL THERAPIST

## 2021-09-23 PROCEDURE — 97110 THERAPEUTIC EXERCISES: CPT | Performed by: PHYSICAL THERAPIST

## 2021-09-23 NOTE — PROGRESS NOTES
Daily Note     Today's date: 2021  Patient name: Farheen Malcolm  : 1955  MRN: [id number]  Referring provider: Maria Esther Kramer MD  Dx:   Encounter Diagnosis     ICD-10-CM    1  Right shoulder pain, unspecified chronicity  M25 511    2  S/P rotator cuff surgery  [id number]        Start Time: 1300  Stop Time: 7580  Total time in clinic (min): 45 minutes    Subjective: Notes right shoulder hurts all the time  Notes she is able to use it for most everything but pain persists  Today hurts all over which she attributes to rainy weather  Objective: See treatment diary below      Assessment: Tolerated treatment well  Patient would benefit from continued PT End range flexion tightness with scapular muscle spasm right  Increasing strength but pain persists  Plan: Continue per plan of care        Precautions: HTN, FMS  DOS:21       Manuals 9/2 9/9 9/10 9/20 9/23   8/13 8/18 8/23   PROM R shoulder 10 10 10 12 12'  10' 10 10 10   LB/LE   8    10' 10' nt nt                             Neuro Re-Ed                                                                                                        Ther Ex             pendulum   3'  3' 3' 3' 3' 3' 3' AH AH   Ball  table slides/ CW/CCW AROM  30x Yellow  30x 30x  each 30x  Each wall 30x ea (big yellow) 30x ea  30x 30x  30x 30x                pulley 3' 3' 3'  FF 3' FF 3' FF 3'  3' 3' 3' 3'   Wand AA Flexion/cp 20x 10"/10x 10x 10x 10"/10x  20x 20x 30x  each 30x   Serratus punch/ circles Red  10x 10x 10x 10x Red  15x        Roller up wall   10x 10x 10x        RTC RTB  30x each 20x each  RTB  30x RTB  30x        UBE (no resistance) 5  6' 6' 6'  4'  2F2B 4' 2F2B 4' 5'   SL ER 30x 30x  30x 30x  20x 20x     TB row 30x   BTB  30x BTB  30x  RTB  20x RTB 20x RTB  30x 30x   TB ext 30x   btb  30x BTB  30x  RTB  20x RTB x20 RTB  30x 30x   AROM FF   pegs Blue  2 rows Yellow  2 rows 2x Y 2 rows yellow 2xY  5x 5x 10x 10x   Ther Activity             Seated thor ext over  elbert   5x                       Gait Training                                       Modalities             CP post  10' 10 10 10 10' 10' 10 10 10 10

## 2021-09-26 DIAGNOSIS — R07.9 CHEST PAIN DUE TO GASTROINTESTINAL REFLUX DISEASE: ICD-10-CM

## 2021-09-26 DIAGNOSIS — K21.9 CHEST PAIN DUE TO GASTROINTESTINAL REFLUX DISEASE: ICD-10-CM

## 2021-09-26 DIAGNOSIS — K21.9 GASTROESOPHAGEAL REFLUX DISEASE WITHOUT ESOPHAGITIS: ICD-10-CM

## 2021-09-27 ENCOUNTER — APPOINTMENT (OUTPATIENT)
Dept: PHYSICAL THERAPY | Age: 66
End: 2021-09-27
Payer: MEDICARE

## 2021-09-27 RX ORDER — FAMOTIDINE 40 MG/1
TABLET, FILM COATED ORAL
Qty: 90 TABLET | Refills: 1 | Status: SHIPPED | OUTPATIENT
Start: 2021-09-27

## 2021-10-01 ENCOUNTER — OFFICE VISIT (OUTPATIENT)
Dept: PHYSICAL THERAPY | Age: 66
End: 2021-10-01
Payer: MEDICARE

## 2021-10-01 DIAGNOSIS — M25.511 RIGHT SHOULDER PAIN, UNSPECIFIED CHRONICITY: Primary | ICD-10-CM

## 2021-10-01 DIAGNOSIS — Z98.890 S/P ROTATOR CUFF SURGERY: ICD-10-CM

## 2021-10-01 PROCEDURE — 97110 THERAPEUTIC EXERCISES: CPT | Performed by: PHYSICAL THERAPIST

## 2021-10-01 PROCEDURE — 97140 MANUAL THERAPY 1/> REGIONS: CPT | Performed by: PHYSICAL THERAPIST

## 2021-10-04 ENCOUNTER — APPOINTMENT (OUTPATIENT)
Dept: PHYSICAL THERAPY | Age: 66
End: 2021-10-04
Payer: MEDICARE

## 2021-10-11 ENCOUNTER — OFFICE VISIT (OUTPATIENT)
Dept: PHYSICAL THERAPY | Age: 66
End: 2021-10-11
Payer: MEDICARE

## 2021-10-11 DIAGNOSIS — M25.511 RIGHT SHOULDER PAIN, UNSPECIFIED CHRONICITY: Primary | ICD-10-CM

## 2021-10-11 DIAGNOSIS — Z98.890 S/P ROTATOR CUFF SURGERY: ICD-10-CM

## 2021-10-11 PROCEDURE — 97110 THERAPEUTIC EXERCISES: CPT | Performed by: PHYSICAL THERAPIST

## 2021-10-11 PROCEDURE — 97140 MANUAL THERAPY 1/> REGIONS: CPT | Performed by: PHYSICAL THERAPIST

## 2021-10-15 ENCOUNTER — OFFICE VISIT (OUTPATIENT)
Dept: PHYSICAL THERAPY | Age: 66
End: 2021-10-15
Payer: MEDICARE

## 2021-10-15 DIAGNOSIS — Z98.890 S/P ROTATOR CUFF SURGERY: ICD-10-CM

## 2021-10-15 DIAGNOSIS — M25.511 RIGHT SHOULDER PAIN, UNSPECIFIED CHRONICITY: Primary | ICD-10-CM

## 2021-10-15 PROCEDURE — 97140 MANUAL THERAPY 1/> REGIONS: CPT | Performed by: PHYSICAL THERAPIST

## 2021-10-15 PROCEDURE — 97110 THERAPEUTIC EXERCISES: CPT | Performed by: PHYSICAL THERAPIST

## 2021-10-18 ENCOUNTER — OFFICE VISIT (OUTPATIENT)
Dept: PHYSICAL THERAPY | Age: 66
End: 2021-10-18
Payer: MEDICARE

## 2021-10-18 DIAGNOSIS — Z98.890 S/P ROTATOR CUFF SURGERY: ICD-10-CM

## 2021-10-18 DIAGNOSIS — M25.511 RIGHT SHOULDER PAIN, UNSPECIFIED CHRONICITY: Primary | ICD-10-CM

## 2021-10-18 PROCEDURE — 97110 THERAPEUTIC EXERCISES: CPT | Performed by: PHYSICAL THERAPIST

## 2021-10-18 PROCEDURE — 97140 MANUAL THERAPY 1/> REGIONS: CPT | Performed by: PHYSICAL THERAPIST

## 2021-10-20 ENCOUNTER — APPOINTMENT (OUTPATIENT)
Dept: PHYSICAL THERAPY | Age: 66
End: 2021-10-20
Payer: MEDICARE

## 2021-10-25 ENCOUNTER — OFFICE VISIT (OUTPATIENT)
Dept: PHYSICAL THERAPY | Age: 66
End: 2021-10-25
Payer: MEDICARE

## 2021-10-25 DIAGNOSIS — Z98.890 S/P ROTATOR CUFF SURGERY: ICD-10-CM

## 2021-10-25 DIAGNOSIS — M25.511 RIGHT SHOULDER PAIN, UNSPECIFIED CHRONICITY: Primary | ICD-10-CM

## 2021-10-25 PROCEDURE — 97140 MANUAL THERAPY 1/> REGIONS: CPT | Performed by: PHYSICAL THERAPIST

## 2021-10-25 PROCEDURE — 97110 THERAPEUTIC EXERCISES: CPT | Performed by: PHYSICAL THERAPIST

## 2021-10-28 ENCOUNTER — EVALUATION (OUTPATIENT)
Dept: PHYSICAL THERAPY | Age: 66
End: 2021-10-28
Payer: MEDICARE

## 2021-10-28 DIAGNOSIS — M54.16 LUMBAR RADICULOPATHY: Primary | ICD-10-CM

## 2021-10-28 PROCEDURE — 97110 THERAPEUTIC EXERCISES: CPT | Performed by: PHYSICAL THERAPIST

## 2021-10-28 PROCEDURE — 97140 MANUAL THERAPY 1/> REGIONS: CPT | Performed by: PHYSICAL THERAPIST

## 2021-11-01 ENCOUNTER — OFFICE VISIT (OUTPATIENT)
Dept: PHYSICAL THERAPY | Age: 66
End: 2021-11-01
Payer: MEDICARE

## 2021-11-01 DIAGNOSIS — M54.16 LUMBAR RADICULOPATHY: Primary | ICD-10-CM

## 2021-11-01 PROCEDURE — 97140 MANUAL THERAPY 1/> REGIONS: CPT | Performed by: PHYSICAL THERAPIST

## 2021-11-01 PROCEDURE — 97110 THERAPEUTIC EXERCISES: CPT | Performed by: PHYSICAL THERAPIST

## 2021-11-04 ENCOUNTER — APPOINTMENT (OUTPATIENT)
Dept: PHYSICAL THERAPY | Age: 66
End: 2021-11-04
Payer: MEDICARE

## 2021-11-08 ENCOUNTER — OFFICE VISIT (OUTPATIENT)
Dept: PHYSICAL THERAPY | Age: 66
End: 2021-11-08
Payer: MEDICARE

## 2021-11-08 DIAGNOSIS — M54.16 LUMBAR RADICULOPATHY: Primary | ICD-10-CM

## 2021-11-10 ENCOUNTER — APPOINTMENT (OUTPATIENT)
Dept: PHYSICAL THERAPY | Age: 66
End: 2021-11-10
Payer: MEDICARE

## 2021-11-12 ENCOUNTER — OFFICE VISIT (OUTPATIENT)
Dept: PHYSICAL THERAPY | Age: 66
End: 2021-11-12
Payer: MEDICARE

## 2021-11-12 DIAGNOSIS — M54.16 LUMBAR RADICULOPATHY: Primary | ICD-10-CM

## 2021-11-12 DIAGNOSIS — Z98.890 S/P ROTATOR CUFF SURGERY: ICD-10-CM

## 2021-11-12 DIAGNOSIS — M25.511 RIGHT SHOULDER PAIN, UNSPECIFIED CHRONICITY: ICD-10-CM

## 2021-11-12 PROCEDURE — 97110 THERAPEUTIC EXERCISES: CPT

## 2021-11-16 DIAGNOSIS — R07.9 CHEST PAIN DUE TO GASTROINTESTINAL REFLUX DISEASE: ICD-10-CM

## 2021-11-16 DIAGNOSIS — K21.9 CHEST PAIN DUE TO GASTROINTESTINAL REFLUX DISEASE: ICD-10-CM

## 2021-11-16 DIAGNOSIS — K21.9 GASTROESOPHAGEAL REFLUX DISEASE WITHOUT ESOPHAGITIS: ICD-10-CM

## 2021-11-16 RX ORDER — ESOMEPRAZOLE MAGNESIUM 40 MG/1
40 CAPSULE, DELAYED RELEASE ORAL
Qty: 180 CAPSULE | Refills: 3 | Status: SHIPPED | OUTPATIENT
Start: 2021-11-16

## 2021-11-17 ENCOUNTER — APPOINTMENT (OUTPATIENT)
Dept: PHYSICAL THERAPY | Age: 66
End: 2021-11-17
Payer: MEDICARE

## 2021-11-19 ENCOUNTER — OFFICE VISIT (OUTPATIENT)
Dept: PHYSICAL THERAPY | Age: 66
End: 2021-11-19
Payer: MEDICARE

## 2021-11-19 DIAGNOSIS — M54.16 LUMBAR RADICULOPATHY: Primary | ICD-10-CM

## 2021-11-19 PROCEDURE — 97110 THERAPEUTIC EXERCISES: CPT | Performed by: PHYSICAL THERAPIST

## 2021-11-19 PROCEDURE — 97140 MANUAL THERAPY 1/> REGIONS: CPT | Performed by: PHYSICAL THERAPIST

## 2021-11-24 ENCOUNTER — OFFICE VISIT (OUTPATIENT)
Dept: PHYSICAL THERAPY | Age: 66
End: 2021-11-24
Payer: MEDICARE

## 2021-11-24 DIAGNOSIS — M54.16 LUMBAR RADICULOPATHY: Primary | ICD-10-CM

## 2021-11-24 PROCEDURE — 97110 THERAPEUTIC EXERCISES: CPT

## 2021-11-26 ENCOUNTER — APPOINTMENT (OUTPATIENT)
Dept: PHYSICAL THERAPY | Age: 66
End: 2021-11-26
Payer: MEDICARE

## 2021-12-01 ENCOUNTER — OFFICE VISIT (OUTPATIENT)
Dept: PHYSICAL THERAPY | Age: 66
End: 2021-12-01
Payer: MEDICARE

## 2021-12-01 DIAGNOSIS — Z98.890 S/P ROTATOR CUFF SURGERY: ICD-10-CM

## 2021-12-01 DIAGNOSIS — M25.511 RIGHT SHOULDER PAIN, UNSPECIFIED CHRONICITY: ICD-10-CM

## 2021-12-01 DIAGNOSIS — M54.16 LUMBAR RADICULOPATHY: Primary | ICD-10-CM

## 2021-12-01 PROCEDURE — 97140 MANUAL THERAPY 1/> REGIONS: CPT | Performed by: PHYSICAL THERAPIST

## 2021-12-01 PROCEDURE — 97110 THERAPEUTIC EXERCISES: CPT | Performed by: PHYSICAL THERAPIST

## 2021-12-03 ENCOUNTER — OFFICE VISIT (OUTPATIENT)
Dept: PHYSICAL THERAPY | Age: 66
End: 2021-12-03
Payer: MEDICARE

## 2021-12-03 DIAGNOSIS — M25.511 RIGHT SHOULDER PAIN, UNSPECIFIED CHRONICITY: ICD-10-CM

## 2021-12-03 DIAGNOSIS — M54.16 LUMBAR RADICULOPATHY: Primary | ICD-10-CM

## 2021-12-03 DIAGNOSIS — Z98.890 S/P ROTATOR CUFF SURGERY: ICD-10-CM

## 2021-12-03 PROCEDURE — 97110 THERAPEUTIC EXERCISES: CPT

## 2021-12-03 PROCEDURE — 97140 MANUAL THERAPY 1/> REGIONS: CPT

## 2021-12-08 ENCOUNTER — OFFICE VISIT (OUTPATIENT)
Dept: PHYSICAL THERAPY | Age: 66
End: 2021-12-08
Payer: MEDICARE

## 2021-12-08 DIAGNOSIS — M54.16 LUMBAR RADICULOPATHY: Primary | ICD-10-CM

## 2021-12-08 PROCEDURE — 97110 THERAPEUTIC EXERCISES: CPT | Performed by: PHYSICAL THERAPIST

## 2021-12-10 ENCOUNTER — OFFICE VISIT (OUTPATIENT)
Dept: PHYSICAL THERAPY | Age: 66
End: 2021-12-10
Payer: MEDICARE

## 2021-12-10 DIAGNOSIS — M54.16 LUMBAR RADICULOPATHY: Primary | ICD-10-CM

## 2021-12-10 PROCEDURE — 97140 MANUAL THERAPY 1/> REGIONS: CPT | Performed by: PHYSICAL THERAPIST

## 2021-12-10 PROCEDURE — 97110 THERAPEUTIC EXERCISES: CPT | Performed by: PHYSICAL THERAPIST

## 2022-04-20 ENCOUNTER — APPOINTMENT (OUTPATIENT)
Dept: PHYSICAL THERAPY | Age: 67
End: 2022-04-20
Payer: MEDICARE

## 2022-04-22 ENCOUNTER — APPOINTMENT (OUTPATIENT)
Dept: PHYSICAL THERAPY | Age: 67
End: 2022-04-22
Payer: MEDICARE

## 2022-04-25 ENCOUNTER — EVALUATION (OUTPATIENT)
Dept: PHYSICAL THERAPY | Age: 67
End: 2022-04-25
Payer: MEDICARE

## 2022-04-25 ENCOUNTER — APPOINTMENT (OUTPATIENT)
Dept: PHYSICAL THERAPY | Age: 67
End: 2022-04-25
Payer: MEDICARE

## 2022-04-25 DIAGNOSIS — M54.41 CHRONIC BILATERAL LOW BACK PAIN WITH BILATERAL SCIATICA: ICD-10-CM

## 2022-04-25 DIAGNOSIS — M54.42 CHRONIC BILATERAL LOW BACK PAIN WITH BILATERAL SCIATICA: ICD-10-CM

## 2022-04-25 DIAGNOSIS — G89.29 CHRONIC BILATERAL LOW BACK PAIN WITH BILATERAL SCIATICA: ICD-10-CM

## 2022-04-25 DIAGNOSIS — M43.26 FUSION OF SPINE OF LUMBAR REGION: Primary | ICD-10-CM

## 2022-04-25 PROCEDURE — 97110 THERAPEUTIC EXERCISES: CPT | Performed by: PHYSICAL THERAPIST

## 2022-04-25 PROCEDURE — 97163 PT EVAL HIGH COMPLEX 45 MIN: CPT | Performed by: PHYSICAL THERAPIST

## 2022-04-25 NOTE — PROGRESS NOTES
PT Evaluation     Today's date: 2022  Patient name: James Bhagat  : [birth year]  MRN: [id number]  Referring provider: Moira Saenz PA-C  Dx:   Encounter Diagnosis     ICD-10-CM    1  Fusion of spine of lumbar region  M43 26    2  Chronic bilateral low back pain with bilateral sciatica  M54 42     M54 41     G89 29        Start Time: 1645  Stop Time: 1745  Total time in clinic (min): 60 minutes    Assessment  Assessment details: James Bhagat is a pleasant 78 y o  female who presents with residual low back pain and weakness  The primary movement problem is LE weakness with poor core activation resulting in chronic back pain s/p lumbar fusion and limiting her ability to carry, lift, bend,perfrom housework or yard work,  walking, standing and don/doff socks  No further referral appears necessary at this time based upon examination results  The patient's greatest concerns are the pain she is experiencing and fear of not being able to keep active  Problem List:  1) LE weakness with poor glute activation-addressing with progressive strengthening exercises  2)core weakness with limited activation of TA and multifidus-addressing with neuromotor retraining  3) Chronic pain-addressing with progressive graded exercises      Etiologic factors include post-lumbar fusion  Impairments: abnormal or restricted ROM, activity intolerance, impaired balance, impaired physical strength, lacks appropriate home exercise program and pain with function  Functional limitations: lift, bend, carry, walk, stand, iadls, adlsPrognosis details: Positive prognostic indicators include positive attitude toward recovery  Negative prognostic indicators include chronicity of symptoms, fibromyalgia and multiple concurrent orthopedic problems  Goals  1  Pt will be able to don/doff socks without difficulty  2  Pt will be able to perform light house work including cooking/cleaning/laundry    3  Pt will be able to return to birding  4  Pt will be able to get out of chair/car with ease  5  Patient will be independent with home exercise program    6  Patient will be able to manage symptoms independently  Plan  Patient would benefit from: skilled physical therapy  Planned therapy interventions: neuromuscular re-education, strengthening, therapeutic activities, therapeutic exercise, functional ROM exercises, graded exercise, home exercise program and abdominal trunk stabilization  Frequency: 2x week  Duration in visits: 14  Duration in weeks: 8  Plan of Care beginning date: 2022  Plan of Care expiration date: 2022        Subjective Evaluation    History of Present Illness  Date of surgery: 2022  Mechanism of injury: Pt had a history of chronic low back pain and underwent surgical fusion L4-L5-S1 on 22  Pt was hospitalized until  due to anemia  Prior to the surgery she had chronic low back pain with right LE radiating pain and she c/o weakness  She was unable to walk for long periods and could not tolerate standing for long periods  She does report her pain has improved since the surgery  Currently she c/o occasional intermittent right LE pain to the bottom of the foot and right buttocks with a small focal area of numbness in the buttock  She reports left low back and  buttocks pain at times  She is now referred for OPT to increase LE strength  She initially had a 10# lifting restriction  She is using a bone stimulator 1xday for 30min  Pt's PMH is extensive but is significant for prior lumbar laminectomy  Patient's greatest concern is the pain she is experiencing and she would like to be able to go birding and move with less pain    Quality of life: fair    Pain  Current pain ratin  At worst pain ratin  Quality: dull ache and radiating    Social Support  Steps to enter house: yes (1 large step)  Stairs in house: yes   Lives in: multiple-level home  Lives with: spouse    Patient Goals  Patient goals for therapy: increased strength and decreased pain  Patient goal: be able to go birding          Objective     Concurrent Complaints      Additional Special Questions  No red flags    Neurological Testing     Sensation     Lumbar   Left   Intact: light touch and pin prick    Right   Intact: light touch and pin prick    Reflexes   Left   Patellar (L4): trace (1+)    Right   Patellar (L4): trace (1+)    Active Range of Motion     Lumbar   Flexion:  Restriction level: moderate  Extension:  Restriction level: moderate  Left lateral flexion:  Restriction level: moderate  Right lateral flexion:  Restriction level: moderate  Left rotation:  Restriction level: maximal  Right rotation:  Restriction level: maximal    Strength/Myotome Testing     Left Hip   Planes of Motion   Flexion: 3  Abduction: 4  Adduction: 4+    Right Hip   Planes of Motion   Flexion: 4-  Abduction: 4  Adduction: 4+    Left Knee   Flexion: 4  Extension: 4-    Right Knee   Flexion: 4  Extension: 4    Left Ankle/Foot   Dorsiflexion: 5  Plantar flexion: 4+    Right Ankle/Foot   Dorsiflexion: 5  Plantar flexion: 4+    Muscle Activation     Additional Muscle Activation Details  Weakness and poor activation of TA and multifidus    Ambulation     Ambulation: Level Surfaces   Ambulation without assistive device: independent    Additional Level Surfaces Ambulation Details  Initially used walker post-surgery and then progressed sp cane  She currently is walking without AD but will use a cane if needed due to knee and hip pain    Ambulation: Stairs   Ascend stairs: independent  Pattern: non-reciprocal  Railings: one rail  Descend stairs: independent  Pattern: non-reciprocal  Railings: one rail    Additional Stairs Ambulation Details  Pt can inconsistently walk up stairs step over step but generally step to    General Comments:      Lumbar Comments  Incision well healed and no hypersensitivity              Precautions: fusion L4-S1, no BLT    TherEx 4/25 Ball ADD 30x            Bent knee fallout 10x            SAQ 2#/ 20x ea            LAQ 2#/20x ea            Nustep                           Step up glute activation             standing row                                                    Neuro Re-Ed             ADIM 20x3"            suine hpooklying multifidus heel press 20x ea            prone glute activation 30x3"                                                                                                                                                                                     Ther Activity                                       Gait Training                                       Modalities

## 2022-04-25 NOTE — LETTER
2022    Danilo West PA-C  [address] [facility] [place] [address]    Patient: Tony Sommers   YOB: 1955   Date of Visit: 2022     Encounter Diagnosis     ICD-10-CM    1  Fusion of spine of lumbar region  M43 26    2  Chronic bilateral low back pain with bilateral sciatica  M54 42     M54 41     G89 29        Dear Dr Abraham Nye:    Thank you for your recent referral of Tony Sommers  Please review the attached evaluation summary from Paulina's recent visit  Please verify that you agree with the plan of care by signing the attached order  If you have any questions or concerns, please do not hesitate to call  I sincerely appreciate the opportunity to share in the care of one of your patients and hope to have another opportunity to work with you in the near future  Sincerely,    Lonny Henderson, PT      Referring Provider:      I certify that I have read the below Plan of Care and certify the need for these services furnished under this plan of treatment while under my care  Danilo West PA-C  [address] [facility] [address]  Via Fax: [phone]          PT Evaluation     Today's date: 2022  Patient name: Tony Sommers  :   MRN: [id number]  Referring provider: Alejandro Marie PA-C  Dx:   Encounter Diagnosis     ICD-10-CM    1  Fusion of spine of lumbar region  M43 26    2  Chronic bilateral low back pain with bilateral sciatica  M54 42     M54 41     G89 29        Start Time: 1645  Stop Time: 1745  Total time in clinic (min): 60 minutes    Assessment  Assessment details: Tony Sommers is a pleasant 78 y o  female who presents with residual low back pain and weakness  The primary movement problem is LE weakness with poor core activation resulting in chronic back pain s/p lumbar fusion and limiting her ability to carry, lift, bend,perfrom housework or yard work,  walking, standing and don/doff socks    No further referral appears necessary at this time based upon examination results  The patient's greatest concerns are the pain she is experiencing and fear of not being able to keep active  Problem List:  1) LE weakness with poor glute activation-addressing with progressive strengthening exercises  2)core weakness with limited activation of TA and multifidus-addressing with neuromotor retraining  3) Chronic pain-addressing with progressive graded exercises      Etiologic factors include post-lumbar fusion  Impairments: abnormal or restricted ROM, activity intolerance, impaired balance, impaired physical strength, lacks appropriate home exercise program and pain with function  Functional limitations: lift, bend, carry, walk, stand, iadls, adlsPrognosis details: Positive prognostic indicators include positive attitude toward recovery  Negative prognostic indicators include chronicity of symptoms, fibromyalgia and multiple concurrent orthopedic problems  Goals  1  Pt will be able to don/doff socks without difficulty  2  Pt will be able to perform light house work including cooking/cleaning/laundry  3  Pt will be able to return to birding  4  Pt will be able to get out of chair/car with ease  5  Patient will be independent with home exercise program    6  Patient will be able to manage symptoms independently  Plan  Patient would benefit from: skilled physical therapy  Planned therapy interventions: neuromuscular re-education, strengthening, therapeutic activities, therapeutic exercise, functional ROM exercises, graded exercise, home exercise program and abdominal trunk stabilization  Frequency: 2x week  Duration in visits: 14  Duration in weeks: 8  Plan of Care beginning date: 4/25/2022  Plan of Care expiration date: 7/25/2022        Subjective Evaluation    History of Present Illness  Date of surgery: 2/22/2022  Mechanism of injury: Pt had a history of chronic low back pain and underwent surgical fusion L4-L5-S1 on 2/22/22  Pt was hospitalized until  due to anemia  Prior to the surgery she had chronic low back pain with right LE radiating pain and she c/o weakness  She was unable to walk for long periods and could not tolerate standing for long periods  She does report her pain has improved since the surgery  Currently she c/o occasional intermittent right LE pain to the bottom of the foot and right buttocks with a small focal area of numbness in the buttock  She reports left low back and  buttocks pain at times  She is now referred for OPT to increase LE strength  She initially had a 10# lifting restriction  She is using a bone stimulator 1xday for 30min  Pt's PMH is extensive but is significant for prior lumbar laminectomy  Patient's greatest concern is the pain she is experiencing and she would like to be able to go birding and move with less pain  Quality of life: fair    Pain  Current pain ratin  At worst pain ratin  Quality: dull ache and radiating    Social Support  Steps to enter house: yes (1 large step)  Stairs in house: yes   Lives in: multiple-level home  Lives with: spouse    Patient Goals  Patient goals for therapy: increased strength and decreased pain  Patient goal: be able to go birding          Objective     Concurrent Complaints      Additional Special Questions  No red flags    Neurological Testing     Sensation     Lumbar   Left   Intact: light touch and pin prick    Right   Intact: light touch and pin prick    Reflexes   Left   Patellar (L4): trace (1+)    Right   Patellar (L4): trace (1+)    Active Range of Motion     Lumbar   Flexion:  Restriction level: moderate  Extension:  Restriction level: moderate  Left lateral flexion:  Restriction level: moderate  Right lateral flexion:  Restriction level: moderate  Left rotation:  Restriction level: maximal  Right rotation:  Restriction level: maximal    Strength/Myotome Testing     Left Hip   Planes of Motion   Flexion: 3  Abduction: 4  Adduction: 4+    Right Hip   Planes of Motion   Flexion: 4-  Abduction: 4  Adduction: 4+    Left Knee   Flexion: 4  Extension: 4-    Right Knee   Flexion: 4  Extension: 4    Left Ankle/Foot   Dorsiflexion: 5  Plantar flexion: 4+    Right Ankle/Foot   Dorsiflexion: 5  Plantar flexion: 4+    Muscle Activation     Additional Muscle Activation Details  Weakness and poor activation of TA and multifidus    Ambulation     Ambulation: Level Surfaces   Ambulation without assistive device: independent    Additional Level Surfaces Ambulation Details  Initially used walker post-surgery and then progressed sp cane  She currently is walking without AD but will use a cane if needed due to knee and hip pain    Ambulation: Stairs   Ascend stairs: independent  Pattern: non-reciprocal  Railings: one rail  Descend stairs: independent  Pattern: non-reciprocal  Railings: one rail    Additional Stairs Ambulation Details  Pt can inconsistently walk up stairs step over step but generally step to    General Comments:      Lumbar Comments  Incision well healed and no hypersensitivity              Precautions: fusion L4-S1, no BLT    TherEx 4/25            Ball ADD 30x            Bent knee fallout 10x            SAQ 2#/ 20x ea            LAQ 2#/20x ea            Nustep                           Step up glute activation             standing row                                                    Neuro Re-Ed             ADIM 20x3"            suine hpooklying multifidus heel press 20x ea            prone glute activation 30x3"                                                                                                                                                                                     Ther Activity                                       Gait Training                                       Modalities

## 2022-04-29 ENCOUNTER — OFFICE VISIT (OUTPATIENT)
Dept: PHYSICAL THERAPY | Age: 67
End: 2022-04-29
Payer: MEDICARE

## 2022-04-29 DIAGNOSIS — M54.42 CHRONIC BILATERAL LOW BACK PAIN WITH BILATERAL SCIATICA: ICD-10-CM

## 2022-04-29 DIAGNOSIS — M54.41 CHRONIC BILATERAL LOW BACK PAIN WITH BILATERAL SCIATICA: ICD-10-CM

## 2022-04-29 DIAGNOSIS — G89.29 CHRONIC BILATERAL LOW BACK PAIN WITH BILATERAL SCIATICA: ICD-10-CM

## 2022-04-29 DIAGNOSIS — M43.26 FUSION OF SPINE OF LUMBAR REGION: Primary | ICD-10-CM

## 2022-04-29 PROCEDURE — 97110 THERAPEUTIC EXERCISES: CPT

## 2022-04-29 PROCEDURE — 97112 NEUROMUSCULAR REEDUCATION: CPT

## 2022-04-29 NOTE — PROGRESS NOTES
Daily Note     Today's date: 2022  Patient name: Tresa Harrell  :   MRN: [id number]  Referring provider: Marcelle Reyes PA-C  Dx:   Encounter Diagnosis     ICD-10-CM    1  Fusion of spine of lumbar region  M43 26    2  Chronic bilateral low back pain with bilateral sciatica  M54 42     M54 41     G89 29                   Subjective: Patient reports increased soreness after her initial visit  C/o Left lbp today, notes normally it is the R        Objective: See treatment diary below      Assessment: Tolerated treatment fairly well, weakness LB and abdominal region, Patient required verbal cueing throughout prescribed exercises for proper execution and dosage  Increased program to focus on core strength and activation  Patient demonstrated fatigue post treatment and would benefit from continued PT      Plan: Continue per plan of care        Precautions: fusion L4-S1, no BLT    TherEx            Ball ADD 30x 30x           Bent knee fallout 10x 10x           SAQ 2#/ 20x ea 2#/30           LAQ 2#/20x ea 2#/30           Nustep   5' L4                        Step up glute activation             standing row  3"x30  Red tb                                                  Neuro Re-Ed             ADIM 20x3" 20x3"           suine hpooklying multifidus heel press 20x ea 20x ea           prone glute activation 30x3" 30x3"                                                                                                                                                                                    Ther Activity                                       Gait Training                                       Modalities             ice  Post  10'

## 2022-05-02 ENCOUNTER — OFFICE VISIT (OUTPATIENT)
Dept: PHYSICAL THERAPY | Age: 67
End: 2022-05-02
Payer: MEDICARE

## 2022-05-02 DIAGNOSIS — M54.42 CHRONIC BILATERAL LOW BACK PAIN WITH BILATERAL SCIATICA: ICD-10-CM

## 2022-05-02 DIAGNOSIS — G89.29 CHRONIC BILATERAL LOW BACK PAIN WITH BILATERAL SCIATICA: ICD-10-CM

## 2022-05-02 DIAGNOSIS — M54.41 CHRONIC BILATERAL LOW BACK PAIN WITH BILATERAL SCIATICA: ICD-10-CM

## 2022-05-02 DIAGNOSIS — M43.26 FUSION OF SPINE OF LUMBAR REGION: Primary | ICD-10-CM

## 2022-05-02 PROCEDURE — 97112 NEUROMUSCULAR REEDUCATION: CPT

## 2022-05-02 PROCEDURE — 97110 THERAPEUTIC EXERCISES: CPT

## 2022-05-02 NOTE — PROGRESS NOTES
Daily Note     Today's date: 2022  Patient name: Jb Arita  : [birth year]  MRN: [id number]  Referring provider: David Jackson PA-C  Dx:   Encounter Diagnosis     ICD-10-CM    1  Fusion of spine of lumbar region  M43 26    2  Chronic bilateral low back pain with bilateral sciatica  M54 42     M54 41     G89 29        Start Time: 1300  Stop Time: 1345  Total time in clinic (min): 45 minutes    Subjective: Patient reports her LB is sore today  Objective: See treatment diary below      Assessment: Tolerated treatment fairly well, continue to focus program on core activation and engagement with TE  Patient demonstrated fatigue post treatment and would benefit from continued PT      Plan: Continue per plan of care        Precautions: fusion L4-S1, no BLT    TherEx           Ball ADD 30x 30x 30x          Bent knee fallout 10x 10x 10x          SAQ 2#/ 20x ea 2#/30 2#/30          LAQ 2#/20x ea 2#/30 2#/30          Nustep   5' L4 6'                       Step up glute activation             standing row  3"x30  Red tb 3"x30          TB b should ext   3"x15                                     Neuro Re-Ed             ADIM 20x3" 20x3" 3"x20          suine hpooklying multifidus heel press 20x ea 20x ea 20x ea          prone glute activation 30x3" 30x3" 30x3"          Supine ADIM  march   10x                                                                                                                                                                       Ther Activity                                       Gait Training                                       Modalities             ice  Post  10' 10'

## 2022-05-05 ENCOUNTER — OFFICE VISIT (OUTPATIENT)
Dept: PHYSICAL THERAPY | Age: 67
End: 2022-05-05
Payer: MEDICARE

## 2022-05-05 DIAGNOSIS — M54.41 CHRONIC BILATERAL LOW BACK PAIN WITH BILATERAL SCIATICA: ICD-10-CM

## 2022-05-05 DIAGNOSIS — G89.29 CHRONIC BILATERAL LOW BACK PAIN WITH BILATERAL SCIATICA: ICD-10-CM

## 2022-05-05 DIAGNOSIS — M43.26 FUSION OF SPINE OF LUMBAR REGION: Primary | ICD-10-CM

## 2022-05-05 DIAGNOSIS — M54.42 CHRONIC BILATERAL LOW BACK PAIN WITH BILATERAL SCIATICA: ICD-10-CM

## 2022-05-05 PROCEDURE — 97112 NEUROMUSCULAR REEDUCATION: CPT

## 2022-05-05 PROCEDURE — 97110 THERAPEUTIC EXERCISES: CPT

## 2022-05-05 NOTE — PROGRESS NOTES
Daily Note     Today's date: 2022  Patient name: Dasia Daily  :   MRN: [id number]  Referring provider: Genoveva Watkins PA-C  Dx:   Encounter Diagnosis     ICD-10-CM    1  Fusion of spine of lumbar region  M43 26    2  Chronic bilateral low back pain with bilateral sciatica  M54 42     M54 41     G89 29        Start Time: 1045  Stop Time: 1130  Total time in clinic (min): 45 minutes    Subjective: Reports increased pain upon arrival, pain level is a 7/10  C/o pain at L LB down into buttock  Objective: See treatment diary below      Assessment: Tolerated treatment well  Monitoring to ensure proper dosage of prescribed exercises are performed and to stay on task  Cues for carryover of exercises  Difficulty maintaining core contraction during knee fallout and marching, extra verbal and tactile cues to maintain stability at spine and hips  Continues with discomfort at L LB and buttock post session  Patient would benefit from continued PT  Plan: Continue per plan of care        Precautions: fusion L4-S1, no BLT    TherEx  5 5         Ball ADD 30x 30x 30x 30x          Bent knee fallout 10x 10x 10x 10x          SAQ 2#/ 20x ea 2#/30 2#/30 2# 30x         LAQ 2#/20x ea 2#/30 2#/30 2# 30x          Nustep   5' L4 6' L5 x6'                      Step up glute activation             standing row  3"x30  Red tb 3"x30 3''x30 RTB          TB b should ext   3"x15  3''x15 RTB                                    Neuro Re-Ed             ADIM 20x3" 20x3" 3"x20 3''x20 ea          suine hpooklying multifidus heel press 20x ea 20x ea 20x ea 20x ea          prone glute activation 30x3" 30x3" 30x3" 30x3''         Supine ADIM  march   10x  10x          Multifidus press     RTB 5''x10                                                                                                                                                         Ther Activity                                       Gait Training Modalities             ice  Post  10' 10'

## 2022-05-09 ENCOUNTER — OFFICE VISIT (OUTPATIENT)
Dept: PHYSICAL THERAPY | Age: 67
End: 2022-05-09
Payer: MEDICARE

## 2022-05-09 DIAGNOSIS — M43.26 FUSION OF SPINE OF LUMBAR REGION: Primary | ICD-10-CM

## 2022-05-09 DIAGNOSIS — G89.29 CHRONIC BILATERAL LOW BACK PAIN WITH BILATERAL SCIATICA: ICD-10-CM

## 2022-05-09 DIAGNOSIS — M54.42 CHRONIC BILATERAL LOW BACK PAIN WITH BILATERAL SCIATICA: ICD-10-CM

## 2022-05-09 DIAGNOSIS — M54.41 CHRONIC BILATERAL LOW BACK PAIN WITH BILATERAL SCIATICA: ICD-10-CM

## 2022-05-09 PROCEDURE — 97112 NEUROMUSCULAR REEDUCATION: CPT | Performed by: PHYSICAL THERAPIST

## 2022-05-09 PROCEDURE — 97110 THERAPEUTIC EXERCISES: CPT | Performed by: PHYSICAL THERAPIST

## 2022-05-09 NOTE — PROGRESS NOTES
Daily Note     Today's date: 2022  Patient name: Ranulfo Alexander  :   MRN: [id number]  Referring provider: Krystina Vaca PA-C  Dx:   Encounter Diagnosis     ICD-10-CM    1  Fusion of spine of lumbar region  M43 26    2  Chronic bilateral low back pain with bilateral sciatica  M54 42     M54 41     G89 29        Start Time: 1500  Stop Time: 1545  Total time in clinic (min): 45 minutes    Subjective: Pt c/o left low back pain into left buttock since surgery  Objective: See treatment diary below      Assessment: Core activation improving  Pt moving more essily from supine-sit  Pain remains unchanged  Plan: Continue per plan of care        Precautions: fusion L4-S1, no BLT    TherEx         Ball ADD 30x 30x 30x 30x  30x        Bent knee fallout 10x 10x 10x 10x  10x ea        SAQ 2#/ 20x ea 2#/30 2#/30 2# 30x 3#/ 30        LAQ 2#/20x ea 2#/30 2#/30 2# 30x  3#/ 30        Nustep   5' L4 6' L5 x6' 6'                     Step up glute activation             standing row  3"x30  Red tb 3"x30 3''x30 RTB  30x BTB        TB b should ext   3"x15  3''x15 RTB  15x                                  Neuro Re-Ed             ADIM 20x3" 20x3" 3"x20 3''x20 ea  20x        supine hpooklying multifidus heel press 20x ea 20x ea 20x ea 20x ea  20x ea        prone glute activation 30x3" 30x3" 30x3" 30x3'' 30x        Supine ADIM  march   10x  10x  10x        Multifidus press     RTB 5''x10  10x RTB                                                                                                                                                       Ther Activity                                       Gait Training                                       Modalities             ice  Post  10' 10'

## 2022-05-13 ENCOUNTER — APPOINTMENT (OUTPATIENT)
Dept: PHYSICAL THERAPY | Age: 67
End: 2022-05-13
Payer: MEDICARE

## 2022-05-16 ENCOUNTER — OFFICE VISIT (OUTPATIENT)
Dept: PHYSICAL THERAPY | Age: 67
End: 2022-05-16
Payer: MEDICARE

## 2022-05-16 DIAGNOSIS — M54.42 CHRONIC BILATERAL LOW BACK PAIN WITH BILATERAL SCIATICA: ICD-10-CM

## 2022-05-16 DIAGNOSIS — M54.41 CHRONIC BILATERAL LOW BACK PAIN WITH BILATERAL SCIATICA: ICD-10-CM

## 2022-05-16 DIAGNOSIS — G89.29 CHRONIC BILATERAL LOW BACK PAIN WITH BILATERAL SCIATICA: ICD-10-CM

## 2022-05-16 DIAGNOSIS — M43.26 FUSION OF SPINE OF LUMBAR REGION: Primary | ICD-10-CM

## 2022-05-16 PROCEDURE — 97110 THERAPEUTIC EXERCISES: CPT | Performed by: PHYSICAL THERAPIST

## 2022-05-16 PROCEDURE — 97112 NEUROMUSCULAR REEDUCATION: CPT | Performed by: PHYSICAL THERAPIST

## 2022-05-16 NOTE — PROGRESS NOTES
Daily Note     Today's date: 2022  Patient name: Kylie Chapman  :   MRN: [id number]  Referring provider: Zandra Paris PA-C  Dx:   Encounter Diagnosis     ICD-10-CM    1  Fusion of spine of lumbar region  M43 26    2  Chronic bilateral low back pain with bilateral sciatica  M54 42     M54 41     G89 29        Start Time: 1300          Subjective: Pt offers no new complaints      Objective: See treatment diary below      Assessment:       Plan: Continue per plan of care        Precautions: fusion L4-S1, no BLT    TherEx  5       Ball ADD 30x 30x 30x 30x  30x 30x       Bent knee fallout 10x 10x 10x 10x  10x ea 10x ea       SAQ 2#/ 20x ea 2#/30 2#/30 2# 30x 3#/ 30 3#/ 30       LAQ 2#/20x ea 2#/30 2#/30 2# 30x  3#/ 30 3#/ 30       Nustep   5' L4 6' L5 x6' 6' 7'                    Step up glute activation             standing row  3"x30  Red tb 3"x30 3''x30 RTB  30x BTB 30x       TB b should ext   3"x15  3''x15 RTB  15x 15x                                 Neuro Re-Ed             ADIM 20x3" 20x3" 3"x20 3''x20 ea  20x 20x       supine hooklying multifidus heel press 20x ea 20x ea 20x ea 20x ea  20x ea 30x       prone glute activation 30x3" 30x3" 30x3" 30x3'' 30x 30x       Supine ADIM  march   10x  10x  10x 10x       Multifidus press     RTB 5''x10  10x RTB 10x       Standing ball press      10x 5"                                                                                                                                          Ther Activity                                       Gait Training                                       Modalities             ice  Post  10' 10'

## 2022-05-19 ENCOUNTER — OFFICE VISIT (OUTPATIENT)
Dept: PHYSICAL THERAPY | Age: 67
End: 2022-05-19
Payer: MEDICARE

## 2022-05-19 DIAGNOSIS — M54.42 CHRONIC BILATERAL LOW BACK PAIN WITH BILATERAL SCIATICA: ICD-10-CM

## 2022-05-19 DIAGNOSIS — M54.41 CHRONIC BILATERAL LOW BACK PAIN WITH BILATERAL SCIATICA: ICD-10-CM

## 2022-05-19 DIAGNOSIS — M43.26 FUSION OF SPINE OF LUMBAR REGION: Primary | ICD-10-CM

## 2022-05-19 DIAGNOSIS — G89.29 CHRONIC BILATERAL LOW BACK PAIN WITH BILATERAL SCIATICA: ICD-10-CM

## 2022-05-19 PROCEDURE — 97110 THERAPEUTIC EXERCISES: CPT

## 2022-05-19 PROCEDURE — 97112 NEUROMUSCULAR REEDUCATION: CPT

## 2022-05-19 NOTE — PROGRESS NOTES
Daily Note     Today's date: 2022  Patient name: Jb Arita  :   MRN: [id number]  Referring provider: David Jackson PA-C  Dx:   Encounter Diagnosis     ICD-10-CM    1  Fusion of spine of lumbar region  M43 26    2  Chronic bilateral low back pain with bilateral sciatica  M54 42     M54 41     G89 29                   Subjective: Pt has no new changes to report upon presentation  Notes that when she bends over she gets into a locked position  Objective: See treatment diary below      Assessment: Pt arrived 15 min late to session with accommodations made  Pt did need encouragement at times throughout to stay on task with exercises  A moderate TrA contraction was present with table top exercises  Plan: Continue per plan of care        Precautions: fusion L4-S1, no BLT    TherEx       Ball ADD 30x 30x 30x 30x  30x 30x 30x      Bent knee fallout 10x 10x 10x 10x  10x ea 10x ea 10x ea      SAQ 2#/ 20x ea 2#/30 2#/30 2# 30x 3#/ 30 3#/ 30 3#/ 30      LAQ 2#/20x ea 2#/30 2#/30 2# 30x  3#/ 30 3#/ 30 3#/ 30      Nustep   5' L4 6' L5 x6' 6' 7' 7'                   Step up glute activation             standing row  3"x30  Red tb 3"x30 3''x30 RTB  30x BTB 30x 30x BTB      TB b should ext   3"x15  3''x15 RTB  15x 15x 30x BTB                                Neuro Re-Ed             ADIM 20x3" 20x3" 3"x20 3''x20 ea  20x 20x 20x      supine hooklying multifidus heel press 20x ea 20x ea 20x ea 20x ea  20x ea 30x 30x      prone glute activation 30x3" 30x3" 30x3" 30x3'' 30x 30x 30x      Supine ADIM  march   10x  10x  10x 10x 10x      Multifidus press     RTB 5''x10  10x RTB 10x 15x RTB      Standing ball press      10x 5"  20x 5"                                                                                                                                        Ther Activity                                       Gait Training                                       Modalities ice  Post  10' 10'

## 2022-05-23 ENCOUNTER — APPOINTMENT (OUTPATIENT)
Dept: PHYSICAL THERAPY | Age: 67
End: 2022-05-23
Payer: MEDICARE

## 2022-05-24 ENCOUNTER — APPOINTMENT (OUTPATIENT)
Dept: PHYSICAL THERAPY | Age: 67
End: 2022-05-24
Payer: MEDICARE

## 2022-05-26 ENCOUNTER — OFFICE VISIT (OUTPATIENT)
Dept: PHYSICAL THERAPY | Age: 67
End: 2022-05-26
Payer: MEDICARE

## 2022-05-26 DIAGNOSIS — M54.41 CHRONIC BILATERAL LOW BACK PAIN WITH BILATERAL SCIATICA: ICD-10-CM

## 2022-05-26 DIAGNOSIS — M43.26 FUSION OF SPINE OF LUMBAR REGION: Primary | ICD-10-CM

## 2022-05-26 DIAGNOSIS — M54.42 CHRONIC BILATERAL LOW BACK PAIN WITH BILATERAL SCIATICA: ICD-10-CM

## 2022-05-26 DIAGNOSIS — G89.29 CHRONIC BILATERAL LOW BACK PAIN WITH BILATERAL SCIATICA: ICD-10-CM

## 2022-05-26 PROCEDURE — 97110 THERAPEUTIC EXERCISES: CPT

## 2022-05-26 PROCEDURE — 97112 NEUROMUSCULAR REEDUCATION: CPT

## 2022-05-26 NOTE — PROGRESS NOTES
Daily Note     Today's date: 2022  Patient name: Jazmin Gaytan  :   MRN: [id number]  Referring provider: Moni Eagle PA-C  Dx:   Encounter Diagnosis     ICD-10-CM    1  Fusion of spine of lumbar region  M43 26    2  Chronic bilateral low back pain with bilateral sciatica  M54 42     M54 41     G89 29        Start Time: 1600  Stop Time: 1643  Total time in clinic (min): 43 minutes    Subjective: Reports her L LB is bothering her today  States she was really sore the following day after last session  States she did drive to [place] but does not think that is what caused her pain  No significant improvement noted in pain reported  Objective: See treatment diary below      Assessment: Tolerated treatment fair  Cues for carryover of program and to ensure proper form is maintained  Emphasis on posture and core engagement t/o session  Extra cues for core stabilization during knee fall outs and to perform slowly in order to control the movement  Patient performed multifidus press with a blue tband today with good tolerance  C/o increased discomfort at L LB when performing marches with core stabilization  No other progressions made today secondary to elevated pain  Patient would benefit from continued PT      Plan: Continue per plan of care        Precautions: fusion L4-S1, no BLT    TherEx  5/     Ball ADD 30x 30x 30x 30x  30x 30x 30x 30x      Bent knee fallout 10x 10x 10x 10x  10x ea 10x ea 10x ea 20x ea      SAQ 2#/ 20x ea 2#/30 2#/30 2# 30x 3#/ 30 3#/ 30 3#/ 30 3# 30x      LAQ 2#/20x ea 2#/30 2#/30 2# 30x  3#/ 30 3#/ 30 3#/ 30 3# 30x      Nustep   5' L4 6' L5 x6' 6' 7' 7' 7'                  Step up glute activation             standing row  3"x30  Red tb 3"x30 3''x30 RTB  30x BTB 30x 30x BTB 30x BTB      TB b should ext   3"x15  3''x15 RTB  15x 15x 30x BTB 30x BTB                                Neuro Re-Ed             ADIM 20x3" 20x3" 3"x20 3''x20 ea  20x 20x 20x 20x     supine hooklying multifidus heel press 20x ea 20x ea 20x ea 20x ea  20x ea 30x 30x 30x      prone glute activation 30x3" 30x3" 30x3" 30x3'' 30x 30x 30x 30x     Supine ADIM  march   10x  10x  10x 10x 10x 10x     Multifidus press     RTB 5''x10  10x RTB 10x 15x RTB 15x BTB      Standing ball press      10x 5"  20x 5" 20x5''                                                                                                                                       Ther Activity                                       Gait Training                                       Modalities             ice  Post  10' 10'

## 2022-06-01 ENCOUNTER — EVALUATION (OUTPATIENT)
Dept: PHYSICAL THERAPY | Age: 67
End: 2022-06-01
Payer: MEDICARE

## 2022-06-01 DIAGNOSIS — M54.42 CHRONIC BILATERAL LOW BACK PAIN WITH BILATERAL SCIATICA: ICD-10-CM

## 2022-06-01 DIAGNOSIS — M43.26 FUSION OF SPINE OF LUMBAR REGION: Primary | ICD-10-CM

## 2022-06-01 DIAGNOSIS — M54.41 CHRONIC BILATERAL LOW BACK PAIN WITH BILATERAL SCIATICA: ICD-10-CM

## 2022-06-01 DIAGNOSIS — G89.29 CHRONIC BILATERAL LOW BACK PAIN WITH BILATERAL SCIATICA: ICD-10-CM

## 2022-06-01 PROCEDURE — 97112 NEUROMUSCULAR REEDUCATION: CPT | Performed by: PHYSICAL THERAPIST

## 2022-06-01 PROCEDURE — 97110 THERAPEUTIC EXERCISES: CPT | Performed by: PHYSICAL THERAPIST

## 2022-06-01 NOTE — PROGRESS NOTES
PT Re-Evaluation     Today's date: 2022  Patient name: Melly Peralta  : 1955  MRN: [id number]  Referring provider: Brent Diaz PA-C  Dx:   Encounter Diagnosis     ICD-10-CM    1  Fusion of spine of lumbar region  M43 26    2  Chronic bilateral low back pain with bilateral sciatica  M54 42     M54 41     G89 29        Start Time: 1600  Stop Time: 1645  Total time in clinic (min): 45 minutes    Assessment  Assessment details: Melly Peralta is a pleasant 78 y o  female who presents with residual low back pain and weakness  The primary movement problem is LE weakness with poor core activation resulting in chronic back pain s/p lumbar fusion and limiting her ability to carry, lift, bend,perfrom housework or yard work,  walking, standing and don/doff socks  No further referral appears necessary at this time based upon examination results  The patient's greatest concerns are the pain she is experiencing and fear of not being able to keep active  Problem List:  1) LE weakness with poor glute activation-addressing with progressive strengthening exercises  2)core weakness with limited activation of TA and multifidus-addressing with neuromotor retraining  3) Chronic pain-addressing with progressive graded exercises      Etiologic factors include post-lumbar fusion  2022  Pt has improved AROM of the trunk  She is able to do more light activities at home  Her LE pain has dissipated but her back pain remains relatively unchanged but less frequent at chigger pain levels  She has difficulty walking for long periods  Her core activation and general strength is improving  Her exercise tolerance has increased  Her FOTO score improved compared to her initial assessment   At this time Miguelina Mendez would benefit continued skilled physical therapy to achieve maximum functional potential   Impairments: abnormal or restricted ROM, activity intolerance, impaired balance, impaired physical strength, lacks appropriate home exercise program and pain with function  Functional limitations: lift, bend, carry, walk, stand, iadls, adlsPrognosis details: Positive prognostic indicators include positive attitude toward recovery  Negative prognostic indicators include chronicity of symptoms, fibromyalgia and multiple concurrent orthopedic problems  Goals  1  Pt will be able to don/doff socks without difficulty  Achieved  2  Pt will be able to perform light house work including cooking/cleaning/laundry  Achieved but with persistent pain  3  Pt will be able to return to birding  No achieved  4  Pt will be able to get out of chair/car with ease  Improved from chair, working towards car  5  Patient will be independent with home exercise program    6  Patient will be able to manage symptoms independently  New goal:  Pt will be fidelia to tend to garden    Plan  Patient would benefit from: skilled physical therapy  Planned therapy interventions: neuromuscular re-education, strengthening, therapeutic activities, therapeutic exercise, functional ROM exercises, graded exercise, home exercise program and abdominal trunk stabilization  Frequency: 2x week  Duration in visits: 14  Duration in weeks: 8  Plan of Care beginning date: 4/25/2022  Plan of Care expiration date: 7/25/2022        Subjective Evaluation    History of Present Illness  Date of surgery: 2/22/2022  Mechanism of injury: Pt had a history of chronic low back pain and underwent surgical fusion L4-L5-S1 on 2/22/22  Pt was hospitalized until 2/25 due to anemia  Prior to the surgery she had chronic low back pain with right LE radiating pain and she c/o weakness  She was unable to walk for long periods and could not tolerate standing for long periods  She does report her pain has improved since the surgery  Currently she c/o occasional intermittent right LE pain to the bottom of the foot and right buttocks with a small focal area of numbness in the buttock   She reports left low back and  buttocks pain at times  She is now referred for OPT to increase LE strength  She initially had a 10# lifting restriction  She is using a bone stimulator 1xday for 30min  Pt's PMH is extensive but is significant for prior lumbar laminectomy  Patient's greatest concern is the pain she is experiencing and she would like to be able to go birding and move with less pain  /  Pt reports her leg pain is mostly gone but she has persistent back pain  She is having less episodes of high pain levels  She is able to do a little more around her house but still has difficulty with vacuuming and activities like cleaning her bathroom  Quality of life: fair    Pain  Current pain ratin  At worst pain ratin  Quality: dull ache and radiating  Progression: improved    Social Support  Steps to enter house: yes (1 large step)  Stairs in house: yes   Lives in: multiple-level home  Lives with: spouse    Patient Goals  Patient goals for therapy: increased strength and decreased pain  Patient goal: be able to go birding          Objective     Concurrent Complaints      Additional Special Questions  No red flags    Neurological Testing     Sensation     Lumbar   Left   Intact: light touch and pin prick    Right   Intact: light touch and pin prick    Reflexes   Left   Patellar (L4): trace (1+)    Right   Patellar (L4): trace (1+)    Active Range of Motion     Lumbar   Flexion:  Restriction level: minimal  Extension:  Restriction level: moderate  Left lateral flexion:  Restriction level: moderate  Right lateral flexion:  Restriction level: moderate  Left rotation:  Restriction level: minimal    Strength/Myotome Testing     Left Hip   Planes of Motion   Flexion: 4  Abduction: 4  Adduction: 4+    Right Hip   Planes of Motion   Flexion: 4  Abduction: 4  Adduction: 4+    Left Knee   Flexion: 4  Extension: 4    Right Knee   Flexion: 4  Extension: 4    Left Ankle/Foot   Dorsiflexion: 5  Plantar flexion: 4+    Right Ankle/Foot Dorsiflexion: 5  Plantar flexion: 4+    Muscle Activation     Additional Muscle Activation Details  Weakness and poor activation of TA and multifidus    Ambulation     Ambulation: Level Surfaces   Ambulation without assistive device: independent    Additional Level Surfaces Ambulation Details  Initially used walker post-surgery and then progressed sp cane  She currently is walking without AD but will use a cane if needed due to knee and hip pain    Ambulation: Stairs   Ascend stairs: independent  Pattern: non-reciprocal  Railings: one rail  Descend stairs: independent  Pattern: non-reciprocal  Railings: one rail    Additional Stairs Ambulation Details  Pt can inconsistently walk up stairs step over step but generally step to    General Comments:      Lumbar Comments  Incision well healed and no hypersensitivity       Flowsheet Rows    Flowsheet Row Most Recent Value   PT/OT G-Codes    Current Score 57   Projected Score 45             Precautions: fusion L4-S1, no BLT    TherEx 4/25 4/29 5/2 5/5 5/9 5/16 5/19 5/26 6/1    Ball ADD 30x 30x 30x 30x  30x 30x 30x 30x  30x    Bent knee fallout 10x 10x 10x 10x  10x ea 10x ea 10x ea 20x ea  20x ea    SAQ 2#/ 20x ea 2#/30 2#/30 2# 30x 3#/ 30 3#/ 30 3#/ 30 3# 30x  3#/ 30    LAQ 2#/20x ea 2#/30 2#/30 2# 30x  3#/ 30 3#/ 30 3#/ 30 3# 30x  3#/ 30    Nustep   5' L4 6' L5 x6' 6' 7' 7' 7' 9'                 Step up glute activation             standing row  3"x30  Red tb 3"x30 3''x30 RTB  30x BTB 30x 30x BTB 30x BTB  30x    TB b should ext   3"x15  3''x15 RTB  15x 15x 30x BTB 30x BTB  30x                              Neuro Re-Ed             ADIM 20x3" 20x3" 3"x20 3''x20 ea  20x 20x 20x 20x     supine hooklying multifidus heel press 20x ea 20x ea 20x ea 20x ea  20x ea 30x 30x 30x  30x    prone glute activation 30x3" 30x3" 30x3" 30x3'' 30x 30x 30x 30x 30x     Supine ADIM  march   10x  10x  10x 10x 10x 10x 20x ea    Multifidus press     RTB 5''x10  10x RTB 10x 15x RTB 15x BTB  15x Standing ball press      10x 5"  20x 5" 20x5'' 20x                                                                                                                                      Ther Activity                                       Gait Training                                       Modalities             ice  Post  10' 10'

## 2022-06-01 NOTE — LETTER
2022    Kodi Payne PA-C  [address] [facility] [address]    Patient: Jazmin Gaytan   YOB: 1955   Date of Visit: 2022     Encounter Diagnosis     ICD-10-CM    1  Fusion of spine of lumbar region  M43 26    2  Chronic bilateral low back pain with bilateral sciatica  M54 42     M54 41     G89 29        Dear Dr Kyree Bearden:    Thank you for your recent referral of Jazmin Gaytan  Please review the attached evaluation summary from Paulina's recent visit  Please verify that you agree with the plan of care by signing the attached order  If you have any questions or concerns, please do not hesitate to call  I sincerely appreciate the opportunity to share in the care of one of your patients and hope to have another opportunity to work with you in the near future  Sincerely,    Nancie Mathew, PT      Referring Provider:      I certify that I have read the below Plan of Care and certify the need for these services furnished under this plan of treatment while under my care  Kodi Payne PA-C  [address] [facility] [address]  Via Fax: [phone]          PT Re-Evaluation     Today's date: 2022  Patient name: Jazmin Gaytan  : 1955  MRN: [id number]  Referring provider: Moni Eagle PA-C  Dx:   Encounter Diagnosis     ICD-10-CM    1  Fusion of spine of lumbar region  M43 26    2  Chronic bilateral low back pain with bilateral sciatica  M54 42     M54 41     G89 29        Start Time: 1600  Stop Time: 1645  Total time in clinic (min): 45 minutes    Assessment  Assessment details: Jazmin Gaytan is a pleasant 78 y o  female who presents with residual low back pain and weakness  The primary movement problem is LE weakness with poor core activation resulting in chronic back pain s/p lumbar fusion and limiting her ability to carry, lift, bend,perfrom housework or yard work,  walking, standing and don/doff socks    No further referral appears necessary at this time based upon examination results  The patient's greatest concerns are the pain she is experiencing and fear of not being able to keep active  Problem List:  1) LE weakness with poor glute activation-addressing with progressive strengthening exercises  2)core weakness with limited activation of TA and multifidus-addressing with neuromotor retraining  3) Chronic pain-addressing with progressive graded exercises      Etiologic factors include post-lumbar fusion  6/1/2022  Pt has improved AROM of the trunk  She is able to do more light activities at home  Her LE pain has dissipated but her back pain remains relatively unchanged but less frequent at chigger pain levels  She has difficulty walking for long periods  Her core activation and general strength is improving  Her exercise tolerance has increased  Her FOTO score improved compared to her initial assessment  At this time Dayton Wilkerson would benefit continued skilled physical therapy to achieve maximum functional potential   Impairments: abnormal or restricted ROM, activity intolerance, impaired balance, impaired physical strength, lacks appropriate home exercise program and pain with function  Functional limitations: lift, bend, carry, walk, stand, iadls, adlsPrognosis details: Positive prognostic indicators include positive attitude toward recovery  Negative prognostic indicators include chronicity of symptoms, fibromyalgia and multiple concurrent orthopedic problems  Goals  1  Pt will be able to don/doff socks without difficulty  Achieved  2  Pt will be able to perform light house work including cooking/cleaning/laundry  Achieved but with persistent pain  3  Pt will be able to return to birding  No achieved  4  Pt will be able to get out of chair/car with ease  Improved from chair, working towards car  5  Patient will be independent with home exercise program    6  Patient will be able to manage symptoms independently     New goal:  Pt will be fidelia to tend to garden    Plan  Patient would benefit from: skilled physical therapy  Planned therapy interventions: neuromuscular re-education, strengthening, therapeutic activities, therapeutic exercise, functional ROM exercises, graded exercise, home exercise program and abdominal trunk stabilization  Frequency: 2x week  Duration in visits: 14  Duration in weeks: 8  Plan of Care beginning date: 2022  Plan of Care expiration date: 2022        Subjective Evaluation    History of Present Illness  Date of surgery: 2022  Mechanism of injury: Pt had a history of chronic low back pain and underwent surgical fusion L4-L5-S1 on 22  Pt was hospitalized until  due to anemia  Prior to the surgery she had chronic low back pain with right LE radiating pain and she c/o weakness  She was unable to walk for long periods and could not tolerate standing for long periods  She does report her pain has improved since the surgery  Currently she c/o occasional intermittent right LE pain to the bottom of the foot and right buttocks with a small focal area of numbness in the buttock  She reports left low back and  buttocks pain at times  She is now referred for OPT to increase LE strength  She initially had a 10# lifting restriction  She is using a bone stimulator 1xday for 30min  Pt's PMH is extensive but is significant for prior lumbar laminectomy  Patient's greatest concern is the pain she is experiencing and she would like to be able to go birding and move with less pain    Pt reports her leg pain is mostly gone but she has persistent back pain  She is having less episodes of high pain levels  She is able to do a little more around her house but still has difficulty with vacuuming and activities like cleaning her bathroom    Quality of life: fair    Pain  Current pain ratin  At worst pain ratin  Quality: dull ache and radiating  Progression: improved    Social Support  Steps to enter house: yes (1 large step)  Stairs in house: yes   Lives in: multiple-level home  Lives with: spouse    Patient Goals  Patient goals for therapy: increased strength and decreased pain  Patient goal: be able to go birding          Objective     Concurrent Complaints      Additional Special Questions  No red flags    Neurological Testing     Sensation     Lumbar   Left   Intact: light touch and pin prick    Right   Intact: light touch and pin prick    Reflexes   Left   Patellar (L4): trace (1+)    Right   Patellar (L4): trace (1+)    Active Range of Motion     Lumbar   Flexion:  Restriction level: minimal  Extension:  Restriction level: moderate  Left lateral flexion:  Restriction level: moderate  Right lateral flexion:  Restriction level: moderate  Left rotation:  Restriction level: minimal    Strength/Myotome Testing     Left Hip   Planes of Motion   Flexion: 4  Abduction: 4  Adduction: 4+    Right Hip   Planes of Motion   Flexion: 4  Abduction: 4  Adduction: 4+    Left Knee   Flexion: 4  Extension: 4    Right Knee   Flexion: 4  Extension: 4    Left Ankle/Foot   Dorsiflexion: 5  Plantar flexion: 4+    Right Ankle/Foot   Dorsiflexion: 5  Plantar flexion: 4+    Muscle Activation     Additional Muscle Activation Details  Weakness and poor activation of TA and multifidus    Ambulation     Ambulation: Level Surfaces   Ambulation without assistive device: independent    Additional Level Surfaces Ambulation Details  Initially used walker post-surgery and then progressed sp cane  She currently is walking without AD but will use a cane if needed due to knee and hip pain    Ambulation: Stairs   Ascend stairs: independent  Pattern: non-reciprocal  Railings: one rail  Descend stairs: independent  Pattern: non-reciprocal  Railings: one rail    Additional Stairs Ambulation Details  Pt can inconsistently walk up stairs step over step but generally step to    General Comments:      Lumbar Comments  Incision well healed and no hypersensitivity       Flowsheet Rows    Flowsheet Row Most Recent Value   PT/OT G-Codes    Current Score 57   Projected Score 45             Precautions: fusion L4-S1, no BLT    TherEx 4/25 4/29 5/2 5/5 5/9 5/16 5/19 5/26 6/1    Ball ADD 30x 30x 30x 30x  30x 30x 30x 30x  30x    Bent knee fallout 10x 10x 10x 10x  10x ea 10x ea 10x ea 20x ea  20x ea    SAQ 2#/ 20x ea 2#/30 2#/30 2# 30x 3#/ 30 3#/ 30 3#/ 30 3# 30x  3#/ 30    LAQ 2#/20x ea 2#/30 2#/30 2# 30x  3#/ 30 3#/ 30 3#/ 30 3# 30x  3#/ 30    Nustep   5' L4 6' L5 x6' 6' 7' 7' 7' 9'                 Step up glute activation             standing row  3"x30  Red tb 3"x30 3''x30 RTB  30x BTB 30x 30x BTB 30x BTB  30x    TB b should ext   3"x15  3''x15 RTB  15x 15x 30x BTB 30x BTB  30x                              Neuro Re-Ed             ADIM 20x3" 20x3" 3"x20 3''x20 ea  20x 20x 20x 20x     supine hooklying multifidus heel press 20x ea 20x ea 20x ea 20x ea  20x ea 30x 30x 30x  30x    prone glute activation 30x3" 30x3" 30x3" 30x3'' 30x 30x 30x 30x 30x     Supine ADIM  march   10x  10x  10x 10x 10x 10x 20x ea    Multifidus press     RTB 5''x10  10x RTB 10x 15x RTB 15x BTB  15x    Standing ball press      10x 5"  20x 5" 20x5'' 20x                                                                                                                                      Ther Activity                                       Gait Training                                       Modalities             ice  Post  10' 10'

## 2022-06-03 ENCOUNTER — OFFICE VISIT (OUTPATIENT)
Dept: PHYSICAL THERAPY | Age: 67
End: 2022-06-03
Payer: MEDICARE

## 2022-06-03 DIAGNOSIS — G89.29 CHRONIC BILATERAL LOW BACK PAIN WITH BILATERAL SCIATICA: ICD-10-CM

## 2022-06-03 DIAGNOSIS — M43.26 FUSION OF SPINE OF LUMBAR REGION: Primary | ICD-10-CM

## 2022-06-03 DIAGNOSIS — M54.41 CHRONIC BILATERAL LOW BACK PAIN WITH BILATERAL SCIATICA: ICD-10-CM

## 2022-06-03 DIAGNOSIS — M54.42 CHRONIC BILATERAL LOW BACK PAIN WITH BILATERAL SCIATICA: ICD-10-CM

## 2022-06-03 PROCEDURE — 97110 THERAPEUTIC EXERCISES: CPT

## 2022-06-03 PROCEDURE — 97112 NEUROMUSCULAR REEDUCATION: CPT

## 2022-06-03 NOTE — PROGRESS NOTES
Daily Note     Today's date: 6/3/2022  Patient name: Sukhwinder Jasso  :   MRN: [id number]  Referring provider: Eufemia Steen PA-C  Dx:   Encounter Diagnosis     ICD-10-CM    1  Fusion of spine of lumbar region  M43 26    2  Chronic bilateral low back pain with bilateral sciatica  M54 42     M54 41     G89 29        Start Time: 1500  Stop Time: 1540  Total time in clinic (min): 40 minutes    Subjective: Reports receiving an injection prior to coming to PT at her L LB region  States she was told she can do her PT  States the L side is sore  Objective: See treatment diary below      Assessment: Tolerated treatment fair  Increased pain when lifting LLE in supine to put legs over bolster for SAQ, but was able to complete marches without complaints  Patient was able to complete all exercises below without significant increase in pain  Cues for carryover of exercises and to ensure proper form is maintained  Emphasis on core engagement and posture  Patient would benefit from continued PT  Plan: Continue per plan of care        Precautions: fusion L4-S1, no BLT    TherEx 4/25 4/29 5/2 5/5 5/9 5/16 5/19 5/26 6/1 6/3    Ball ADD 30x 30x 30x 30x  30x 30x 30x 30x  30x 30x   Bent knee fallout 10x 10x 10x 10x  10x ea 10x ea 10x ea 20x ea  20x ea 20x ea    SAQ 2#/ 20x ea 2#/30 2#/30 2# 30x 3#/ 30 3#/ 30 3#/ 30 3# 30x  3#/ 30 3# 30x   LAQ 2#/20x ea 2#/30 2#/30 2# 30x  3#/ 30 3#/ 30 3#/ 30 3# 30x  3#/ 30 3# 30x   Nustep   5' L4 6' L5 x6' 6' 7' 7' 7' 9' 7'                Step up glute activation             standing row  3"x30  Red tb 3"x30 3''x30 RTB  30x BTB 30x 30x BTB 30x BTB  30x 30x   TB b should ext   3"x15  3''x15 RTB  15x 15x 30x BTB 30x BTB  30x 30x                             Neuro Re-Ed             ADIM 20x3" 20x3" 3"x20 3''x20 ea  20x 20x 20x 20x  20x    supine hooklying multifidus heel press 20x ea 20x ea 20x ea 20x ea  20x ea 30x 30x 30x  30x 30x   prone glute activation 30x3" 30x3" 30x3" 30x3'' 30x 30x 30x 30x 30x  30x   Supine ADIM  march   10x  10x  10x 10x 10x 10x 20x ea 20x ea    Multifidus press     RTB 5''x10  10x RTB 10x 15x RTB 15x BTB  15x 15x ea   Standing ball press      10x 5"  20x 5" 20x5'' 20x 20x                                                                                                                                      Ther Activity                                       Gait Training                                       Modalities             ice  Post  10' 10'

## 2022-06-08 ENCOUNTER — OFFICE VISIT (OUTPATIENT)
Dept: PHYSICAL THERAPY | Age: 67
End: 2022-06-08
Payer: MEDICARE

## 2022-06-08 DIAGNOSIS — M54.41 CHRONIC BILATERAL LOW BACK PAIN WITH BILATERAL SCIATICA: ICD-10-CM

## 2022-06-08 DIAGNOSIS — M54.42 CHRONIC BILATERAL LOW BACK PAIN WITH BILATERAL SCIATICA: ICD-10-CM

## 2022-06-08 DIAGNOSIS — M43.26 FUSION OF SPINE OF LUMBAR REGION: Primary | ICD-10-CM

## 2022-06-08 DIAGNOSIS — G89.29 CHRONIC BILATERAL LOW BACK PAIN WITH BILATERAL SCIATICA: ICD-10-CM

## 2022-06-08 PROCEDURE — 97110 THERAPEUTIC EXERCISES: CPT

## 2022-06-08 NOTE — PROGRESS NOTES
Daily Note     Today's date: 2022  Patient name: Boris Ritchie  :   MRN: [id number]  Referring provider: Chris Plunkett PA-C  Dx:   Encounter Diagnosis     ICD-10-CM    1  Fusion of spine of lumbar region  M43 26    2  Chronic bilateral low back pain with bilateral sciatica  M54 42     M54 41     G89 29                   Subjective: Increased pain and soreness reported following gardening  gardening approx 1 hour  Patient reports only one day of relief following injection  Objective: See treatment diary below      Assessment: Modified program secondary to discomfort upon arrival, rest breaks provided to tolerance  Reviewed current guidelines of avoiding BLT  CP applied post session for pain modulation  Plan: Continue per plan of care        Precautions: fusion L4-S1, no BLT    TherEx 6/8  5/2 5/5 5/9 5/16 5/19 5/26 6/1 6/3    Ball ADD 30x  30x 30x  30x 30x 30x 30x  30x 30x   Bent knee fallout 20x  10x 10x  10x ea 10x ea 10x ea 20x ea  20x ea 20x ea    SAQ 2#/ 20x ea  2#/30 2# 30x 3#/ 30 3#/ 30 3#/ 30 3# 30x  3#/ 30 3# 30x   LAQ 2#/20x ea  2#/30 2# 30x  3#/ 30 3#/ 30 3#/ 30 3# 30x  3#/ 30 3# 30x   Nustep    6' L5 x6' 6' 7' 7' 7' 9' 7'                Step up glute activation             standing row BTB 20x  3"x30 3''x30 RTB  30x BTB 30x 30x BTB 30x BTB  30x 30x   TB b should ext BTB 20x  3"x15  3''x15 RTB  15x 15x 30x BTB 30x BTB  30x 30x                             Neuro Re-Ed             ADIM 20x3"  3"x20 3''x20 ea  20x 20x 20x 20x  20x    supine hooklying multifidus heel press 20x ea  20x ea 20x ea  20x ea 30x 30x 30x  30x 30x   prone glute activation 30x3"  30x3" 30x3'' 30x 30x 30x 30x 30x  30x   Supine ADIM  march 20x ea  10x  10x  10x 10x 10x 10x 20x ea 20x ea    Multifidus press  RTB 15x ea   RTB 5''x10  10x RTB 10x 15x RTB 15x BTB  15x 15x ea   Standing ball press 20x 5"     10x 5"  20x 5" 20x5'' 20x 20x Ther Activity                                       Gait Training                                       Modalities             ice Post 10' Post  10' 10'

## 2022-06-10 ENCOUNTER — OFFICE VISIT (OUTPATIENT)
Dept: PHYSICAL THERAPY | Age: 67
End: 2022-06-10
Payer: MEDICARE

## 2022-06-10 DIAGNOSIS — M54.41 CHRONIC BILATERAL LOW BACK PAIN WITH BILATERAL SCIATICA: ICD-10-CM

## 2022-06-10 DIAGNOSIS — M43.26 FUSION OF SPINE OF LUMBAR REGION: Primary | ICD-10-CM

## 2022-06-10 DIAGNOSIS — M54.42 CHRONIC BILATERAL LOW BACK PAIN WITH BILATERAL SCIATICA: ICD-10-CM

## 2022-06-10 DIAGNOSIS — G89.29 CHRONIC BILATERAL LOW BACK PAIN WITH BILATERAL SCIATICA: ICD-10-CM

## 2022-06-10 PROCEDURE — 97110 THERAPEUTIC EXERCISES: CPT

## 2022-06-10 PROCEDURE — 97112 NEUROMUSCULAR REEDUCATION: CPT

## 2022-06-10 NOTE — PROGRESS NOTES
Daily Note     Today's date: 6/10/2022  Patient name: Hortencia Jara  :   MRN: [id number]  Referring provider: Yina Campbell PA-C  Dx:   Encounter Diagnosis     ICD-10-CM    1  Fusion of spine of lumbar region  M43 26    2  Chronic bilateral low back pain with bilateral sciatica  M54 42     M54 41     G89 29        Start Time: 1400  Stop Time: 1445  Total time in clinic (min): 45 minutes    Subjective: Reports her back hurts when she uses it, especially when she goes to get up      Objective: See treatment diary below      Assessment: Tolerated treatment well  Performed glute squeezes in supine today secondary to c/o IBS  Patient was able to complete program as documented below with minimal discomfort noted in L LB  Cues for carryover of exercises and monitoring to ensure patient maintains proper form and performs the proper quantity of exercises  Patient would benefit from continued PT      Plan: Continue per plan of care        Precautions: fusion L4-S1, no BLT    TherEx 6/8 6/10  5/5 5/9 5/16 5/19 5/26 6/1 6/3    Ball ADD 30x 30x  30x  30x 30x 30x 30x  30x 30x   Bent knee fallout 20x 20x ea   10x  10x ea 10x ea 10x ea 20x ea  20x ea 20x ea    SAQ 2#/ 20x ea 2# 30x  2# 30x 3#/ 30 3#/ 30 3#/ 30 3# 30x  3#/ 30 3# 30x   LAQ 2#/20x ea 2# 30x   2# 30x  3#/ 30 3#/ 30 3#/ 30 3# 30x  3#/ 30 3# 30x   Nustep     L5 x6' 6' 7' 7' 7' 9' 7'                Step up glute activation             standing row BTB 20x BTB 20x   3''x30 RTB  30x BTB 30x 30x BTB 30x BTB  30x 30x   TB b should ext BTB 20x BTB 20x  3''x15 RTB  15x 15x 30x BTB 30x BTB  30x 30x                             Neuro Re-Ed             ADIM 20x3" 3'' x20   3''x20 ea  20x 20x 20x 20x  20x    supine hooklying multifidus heel press 20x ea 20x ea  20x ea  20x ea 30x 30x 30x  30x 30x   prone glute activation 30x3" 3''x30 supine  30x3'' 30x 30x 30x 30x 30x  30x   Supine ADIM  march 20x ea 20x ea  10x  10x 10x 10x 10x 20x ea 20x ea    Multifidus press  RTB 15x ea BTB 15x ea  RTB 5''x10  10x RTB 10x 15x RTB 15x BTB  15x 15x ea   Standing ball press 20x 5" 5''x20     10x 5"  20x 5" 20x5'' 20x 20x                                                                                                                                      Ther Activity                                       Gait Training                                       Modalities             ice Post 10' NP

## 2022-06-15 ENCOUNTER — APPOINTMENT (OUTPATIENT)
Dept: PHYSICAL THERAPY | Age: 67
End: 2022-06-15
Payer: MEDICARE

## 2022-06-17 ENCOUNTER — OFFICE VISIT (OUTPATIENT)
Dept: PHYSICAL THERAPY | Age: 67
End: 2022-06-17
Payer: MEDICARE

## 2022-06-17 DIAGNOSIS — M54.41 CHRONIC BILATERAL LOW BACK PAIN WITH BILATERAL SCIATICA: ICD-10-CM

## 2022-06-17 DIAGNOSIS — M54.42 CHRONIC BILATERAL LOW BACK PAIN WITH BILATERAL SCIATICA: ICD-10-CM

## 2022-06-17 DIAGNOSIS — G89.29 CHRONIC BILATERAL LOW BACK PAIN WITH BILATERAL SCIATICA: ICD-10-CM

## 2022-06-17 DIAGNOSIS — M43.26 FUSION OF SPINE OF LUMBAR REGION: Primary | ICD-10-CM

## 2022-06-17 PROCEDURE — 97112 NEUROMUSCULAR REEDUCATION: CPT | Performed by: PHYSICAL MEDICINE & REHABILITATION

## 2022-06-17 PROCEDURE — 97110 THERAPEUTIC EXERCISES: CPT | Performed by: PHYSICAL MEDICINE & REHABILITATION

## 2022-06-17 NOTE — PROGRESS NOTES
Daily Note     Today's date: 2022  Patient name: Boris Ritchie  :   MRN: [id number]  Referring provider: Chris Plunkett PA-C  Dx:   Encounter Diagnosis     ICD-10-CM    1  Fusion of spine of lumbar region  M43 26    2  Chronic bilateral low back pain with bilateral sciatica  M54 42     M54 41     G89 29                   Subjective: Able to accommodate for condensed session  Increased pain with forward flexion, rising to stand  Objective: See treatment diary below    Assessment: Tolerated treatment well  Intermittent VCs for form maintenance with fair carryover  Patient would benefit from continued PT  Plan: Continue per plan of care        Precautions: fusion L4-S1, no BLT    TherEx 6/8 6/10 6/17  5/9 5/16 5/19 5/26 6/1 6/3    Ball ADD 30x 30x 30x  30x 30x 30x 30x  30x 30x   Bent knee fallout 20x 20x ea  20x ea  10x ea 10x ea 10x ea 20x ea  20x ea 20x ea    SAQ 2#/ 20x ea 2# 30x 2# 30x ea  3#/ 30 3#/ 30 3#/ 30 3# 30x  3#/ 30 3# 30x   LAQ 2#/20x ea 2# 30x  2# 30x ea  3#/ 30 3#/ 30 3#/ 30 3# 30x  3#/ 30 3# 30x   Nustep    7' L5  6' 7' 7' 7' 9' 7'                Step up glute activation             standing row BTB 20x BTB 20x  BTB 20x  30x BTB 30x 30x BTB 30x BTB  30x 30x   TB b should ext BTB 20x BTB 20x BTB 20x  15x 15x 30x BTB 30x BTB  30x 30x                             Neuro Re-Ed             ADIM 20x3" 3'' x20    20x 20x 20x 20x  20x    supine hooklying multifidus heel press 20x ea 20x ea 20x ea  20x ea 30x 30x 30x  30x 30x   prone glute activation 30x3" 3''x30 supine   30x 30x 30x 30x 30x  30x   Supine ADIM  march 20x ea 20x ea 20x ea  10x 10x 10x 10x 20x ea 20x ea    Multifidus press  RTB 15x ea BTB 15x ea Black TB 20x ea  10x RTB 10x 15x RTB 15x BTB  15x 15x ea   Standing ball press 20x 5" 5''x20  20x5"   10x 5"  20x 5" 20x5'' 20x 20x                                                                                                                                      Ther Activity Gait Training                                       Modalities             ice Post 10' NP

## 2022-06-27 ENCOUNTER — APPOINTMENT (OUTPATIENT)
Dept: PHYSICAL THERAPY | Age: 67
End: 2022-06-27
Payer: MEDICARE

## 2022-06-29 ENCOUNTER — OFFICE VISIT (OUTPATIENT)
Dept: PHYSICAL THERAPY | Age: 67
End: 2022-06-29
Payer: MEDICARE

## 2022-06-29 DIAGNOSIS — M54.41 CHRONIC BILATERAL LOW BACK PAIN WITH BILATERAL SCIATICA: ICD-10-CM

## 2022-06-29 DIAGNOSIS — M43.26 FUSION OF SPINE OF LUMBAR REGION: Primary | ICD-10-CM

## 2022-06-29 DIAGNOSIS — G89.29 CHRONIC BILATERAL LOW BACK PAIN WITH BILATERAL SCIATICA: ICD-10-CM

## 2022-06-29 DIAGNOSIS — M54.42 CHRONIC BILATERAL LOW BACK PAIN WITH BILATERAL SCIATICA: ICD-10-CM

## 2022-06-29 PROCEDURE — 97112 NEUROMUSCULAR REEDUCATION: CPT

## 2022-06-29 PROCEDURE — 97110 THERAPEUTIC EXERCISES: CPT

## 2022-06-29 NOTE — PROGRESS NOTES
Daily Note     Today's date: 2022  Patient name: Brianna Da Silva  :   MRN: [id number]  Referring provider: Gris Logan PA-C  Dx:   Encounter Diagnosis     ICD-10-CM    1  Fusion of spine of lumbar region  M43 26    2  Chronic bilateral low back pain with bilateral sciatica  M54 42     M54 41     G89 29                   Subjective: Pt reports that her low back is doing well for the most part if she is not doing much  She does not L sided low back pain, feeling tight at times with pain 5/10 which was present upon presentation  Notes that she has been sleeping on her recliner  Objective: See treatment diary below    Assessment: Tolerated treatment well  Accommodations were made since pt arrived 15 min late to session  Will add back in full program NV as appropriate  Continued to encourage the importance of her HEP with semi- compliance  Patient would benefit from continued PT  Plan: Continue per plan of care        Precautions: fusion L4-S1, no BLT    TherEx 6/8 6/10 6/17 6/29    5/26 6/1 6/3    Ball ADD 30x 30x 30x 30x    30x  30x 30x   Bent knee fallout 20x 20x ea  20x ea 20x ea    20x ea  20x ea 20x ea    SAQ 2#/ 20x ea 2# 30x 2# 30x ea 2# 30x ea    3# 30x  3#/ 30 3# 30x   LAQ 2#/20x ea 2# 30x  2# 30x ea nv    3# 30x  3#/ 30 3# 30x   Nustep    7' L5 7' L5    7' 9' 7'                Step up glute activation             standing row BTB 20x BTB 20x  BTB 20x BTB 20x    30x BTB  30x 30x   TB b should ext BTB 20x BTB 20x BTB 20x BTB 20x    30x BTB  30x 30x                             Neuro Re-Ed             ADIM 20x3" 3'' x20       20x  20x    supine hooklying multifidus heel press 20x ea 20x ea 20x ea 20x ea    30x  30x 30x   prone glute activation 30x3" 3''x30 supine      30x 30x  30x   Supine ADIM  march 20x ea 20x ea 20x ea nv    10x 20x ea 20x ea    Multifidus press  RTB 15x ea BTB 15x ea Black TB 20x ea Black TB 20x ea    15x BTB  15x 15x ea   Standing ball press 20x 5" 5''x20  20x5" 20x5'' 20x 20x                                                                                                                                      Ther Activity                                       Gait Training                                       Modalities             ice Post 10' NP

## 2022-07-06 ENCOUNTER — OFFICE VISIT (OUTPATIENT)
Dept: PHYSICAL THERAPY | Age: 67
End: 2022-07-06
Payer: MEDICARE

## 2022-07-06 DIAGNOSIS — G89.29 CHRONIC BILATERAL LOW BACK PAIN WITH BILATERAL SCIATICA: ICD-10-CM

## 2022-07-06 DIAGNOSIS — M54.42 CHRONIC BILATERAL LOW BACK PAIN WITH BILATERAL SCIATICA: ICD-10-CM

## 2022-07-06 DIAGNOSIS — M43.26 FUSION OF SPINE OF LUMBAR REGION: Primary | ICD-10-CM

## 2022-07-06 DIAGNOSIS — M54.41 CHRONIC BILATERAL LOW BACK PAIN WITH BILATERAL SCIATICA: ICD-10-CM

## 2022-07-06 PROCEDURE — 97112 NEUROMUSCULAR REEDUCATION: CPT

## 2022-07-06 PROCEDURE — 97110 THERAPEUTIC EXERCISES: CPT

## 2022-07-06 NOTE — PROGRESS NOTES
Daily Note     Today's date: 2022  Patient name: Bibi Dawson  :   MRN: [id number]  Referring provider: Viktoria Palma PA-C  Dx:   Encounter Diagnosis     ICD-10-CM    1  Fusion of spine of lumbar region  M43 26    2  Chronic bilateral low back pain with bilateral sciatica  M54 42     M54 41     G89 29                   Subjective: Patient reports elevated pain levels, radiculars present on L side  reports she has been cleaning a lot due to company arriving on Friday  Patient reports she will put brace on to complete remainder of her cleaning  Objective: See treatment diary below    Assessment: Held supine TE per pt request due to elevated pain levels  Patient able to complete standing TE with no exacerbation in sx  Reviewed body mechanics and post surgical precautions  CP applied for pain modulation post session  Resume remainder of TE     Plan: Continue per plan of care        Precautions: fusion L4-S1, no BLT    TherEx 6/8 6/10 6/17 6/29 7/6   5/26 6/1 6/3    Ball ADD 30x 30x 30x 30x held   30x  30x 30x   Bent knee fallout 20x 20x ea  20x ea 20x ea held   20x ea  20x ea 20x ea    SAQ 2#/ 20x ea 2# 30x 2# 30x ea 2# 30x ea held   3# 30x  3#/ 30 3# 30x   LAQ 2#/20x ea 2# 30x  2# 30x ea nv held   3# 30x  3#/ 30 3# 30x   Nustep    7' L5 7' L5 7' L5   7' 9' 7'                Step up glute activation             standing row BTB 20x BTB 20x  BTB 20x BTB 20x BTB 20x   30x BTB  30x 30x   TB b should ext BTB 20x BTB 20x BTB 20x BTB 20x BTB 20x   30x BTB  30x 30x                             Neuro Re-Ed             ADIM 20x3" 3'' x20       20x  20x    supine hooklying multifidus heel press 20x ea 20x ea 20x ea 20x ea held   30x  30x 30x   prone glute activation 30x3" 3''x30 supine      30x 30x  30x   Supine ADIM  march 20x ea 20x ea 20x ea nv held   10x 20x ea 20x ea    Multifidus press  RTB 15x ea BTB 15x ea Black TB 20x ea Black TB 20x ea blk tb 20x   15x BTB  15x 15x ea   Standing ball press 20x 5" 5''x20  20x5"     20x5'' 20x 20x                                                                                                                                      Ther Activity                                       Gait Training                                       Modalities             ice Post 10' NP

## 2022-07-13 ENCOUNTER — OFFICE VISIT (OUTPATIENT)
Dept: PHYSICAL THERAPY | Age: 67
End: 2022-07-13
Payer: MEDICARE

## 2022-07-13 DIAGNOSIS — M54.41 CHRONIC BILATERAL LOW BACK PAIN WITH BILATERAL SCIATICA: ICD-10-CM

## 2022-07-13 DIAGNOSIS — M43.26 FUSION OF SPINE OF LUMBAR REGION: Primary | ICD-10-CM

## 2022-07-13 DIAGNOSIS — G89.29 CHRONIC BILATERAL LOW BACK PAIN WITH BILATERAL SCIATICA: ICD-10-CM

## 2022-07-13 DIAGNOSIS — M54.42 CHRONIC BILATERAL LOW BACK PAIN WITH BILATERAL SCIATICA: ICD-10-CM

## 2022-07-13 PROCEDURE — 97112 NEUROMUSCULAR REEDUCATION: CPT | Performed by: PHYSICAL THERAPIST

## 2022-07-13 PROCEDURE — 97110 THERAPEUTIC EXERCISES: CPT | Performed by: PHYSICAL THERAPIST

## 2022-07-13 NOTE — PROGRESS NOTES
Daily Note     Today's date: 2022  Patient name: Lissette Becker  :   MRN: [id number]  Referring provider: Lucero Thomas PA-C  Dx:   Encounter Diagnosis     ICD-10-CM    1  Fusion of spine of lumbar region  M43 26    2  Chronic bilateral low back pain with bilateral sciatica  M54 42     M54 41     G89 29        Start Time: 1015  Stop Time: 1100  Total time in clinic (min): 45 minutes    Subjective: Pt reports her overall pain is better compared to prior to surgery  However, she continues to have pain in left buttocks radiating into the posterior thigh  Objective: See treatment diary below      Assessment: Pt able to resume all exercise  No change in pain levels during activity today  Plan: Continue per plan of care  Re-eval next visit       Precautions: fusion L4-S1, no BLT    TherEx 6/8 6/10 6/17 6/29 7/6 7/13  5/26 6/1 6/3    Ball ADD 30x 30x 30x 30x held 30x  30x  30x 30x   Bent knee fallout 20x 20x ea  20x ea 20x ea held 30x  20x ea  20x ea 20x ea    SAQ 2#/ 20x ea 2# 30x 2# 30x ea 2# 30x ea held 30x ea 2#  3# 30x  3#/ 30 3# 30x   LAQ 2#/20x ea 2# 30x  2# 30x ea nv held 30x ea 2#  3# 30x  3#/ 30 3# 30x   Nustep    7' L5 7' L5 7' L5 8'  7' 9' 7'                Step up glute activation             standing row BTB 20x BTB 20x  BTB 20x BTB 20x BTB 20x 20x  30x BTB  30x 30x   TB b should ext BTB 20x BTB 20x BTB 20x BTB 20x BTB 20x 20x  30x BTB  30x 30x                             Neuro Re-Ed             ADIM 20x3" 3'' x20       20x  20x    supine hooklying multifidus heel press 20x ea 20x ea 20x ea 20x ea held   30x  30x 30x   prone glute activation 30x3" 3''x30 supine    30x  30x 30x  30x   Supine ADIM  march 20x ea 20x ea 20x ea nv held   10x 20x ea 20x ea    Multifidus press  RTB 15x ea BTB 15x ea Black TB 20x ea Black TB 20x ea blk tb 20x   15x BTB  15x 15x ea   Standing ball press 20x 5" 5''x20  20x5"   20x  20x5'' 20x 20x Ther Activity                                       Gait Training                                       Modalities             ice Post 10' NP

## 2022-07-15 ENCOUNTER — EVALUATION (OUTPATIENT)
Dept: PHYSICAL THERAPY | Age: 67
End: 2022-07-15
Payer: MEDICARE

## 2022-07-15 DIAGNOSIS — M54.42 CHRONIC BILATERAL LOW BACK PAIN WITH BILATERAL SCIATICA: ICD-10-CM

## 2022-07-15 DIAGNOSIS — M43.26 FUSION OF SPINE OF LUMBAR REGION: Primary | ICD-10-CM

## 2022-07-15 DIAGNOSIS — G89.29 CHRONIC BILATERAL LOW BACK PAIN WITH BILATERAL SCIATICA: ICD-10-CM

## 2022-07-15 DIAGNOSIS — M54.41 CHRONIC BILATERAL LOW BACK PAIN WITH BILATERAL SCIATICA: ICD-10-CM

## 2022-07-15 PROCEDURE — 97110 THERAPEUTIC EXERCISES: CPT | Performed by: PHYSICAL THERAPIST

## 2022-07-15 PROCEDURE — 97112 NEUROMUSCULAR REEDUCATION: CPT | Performed by: PHYSICAL THERAPIST

## 2022-07-15 NOTE — LETTER
July 15, 2022    José Manuel Magallon PA-C  [address] [facility] [address]    Patient: Alexandr Jeffrey   YOB: 1955   Date of Visit: 7/15/2022     Encounter Diagnosis     ICD-10-CM    1  Fusion of spine of lumbar region  M43 26    2  Chronic bilateral low back pain with bilateral sciatica  M54 42     M54 41     G89 29        Dear Dr Lalitha Anders:    Thank you for your recent referral of Alexandr Jeffrey  Please review the attached evaluation summary from Paulina's recent visit  Please verify that you agree with the plan of care by signing the attached order  If you have any questions or concerns, please do not hesitate to call  I sincerely appreciate the opportunity to share in the care of one of your patients and hope to have another opportunity to work with you in the near future  Sincerely,    Samuel Bishop, PT      Referring Provider:      I certify that I have read the below Plan of Care and certify the need for these services furnished under this plan of treatment while under my care  José Manuel Magallon PA-C  [address] [facility] [address]  Via Fax: [phone]          PT Re-Evaluation     Today's date: 7/15/2022  Patient name: Alexandr Jeffrey  : 1955  MRN: [id number]  Referring provider: Berna Johnson PA-C  Dx:   Encounter Diagnosis     ICD-10-CM    1  Fusion of spine of lumbar region  M43 26    2  Chronic bilateral low back pain with bilateral sciatica  M54 42     M54 41     G89 29        Start Time: 1015  Stop Time: 1100  Total time in clinic (min): 45 minutes    Assessment  Assessment details: Alexandr Jeffrey is a pleasant 78 y o  female who presents with residual low back pain and weakness  The primary movement problem is LE weakness with poor core activation resulting in chronic back pain s/p lumbar fusion and limiting her ability to carry, lift, bend,perfrom housework or yard work,  walking, standing and don/doff socks    No further referral appears necessary at this time based upon examination results  The patient's greatest concerns are the pain she is experiencing and fear of not being able to keep active  Problem List:  1) LE weakness with poor glute activation-addressing with progressive strengthening exercises  2)core weakness with limited activation of TA and multifidus-addressing with neuromotor retraining  3) Chronic pain-addressing with progressive graded exercises      Etiologic factors include post-lumbar fusion  6/1/2022  Pt has improved AROM of the trunk  She is able to do more light activities at home  Her LE pain has dissipated but her back pain remains relatively unchanged but less frequent at chigger pain levels  She has difficulty walking for long periods  Her core activation and general strength is improving  Her exercise tolerance has increased  Her FOTO score improved compared to her initial assessment  At this time [facility] would benefit continued skilled physical therapy to achieve maximum functional potential     7/15  Pt to have follow-up with surgeon today  No change in pain  She continues to have pain radiating into her left buttock and posterior thigh  She has difficulty with heavy activities at home including vacuuming and cleaning her house  Her sitting tolerance <2 hours and her walking tolerance is a few city blocks  At this time we will continue PT for 2 weeks to ensure pt is independent with her exercises and progress to discharge  Impairments: abnormal or restricted ROM, activity intolerance, impaired balance, impaired physical strength, lacks appropriate home exercise program and pain with function  Functional limitations: lift, bend, carry, walk, stand, iadls, adlsPrognosis details: Positive prognostic indicators include positive attitude toward recovery  Negative prognostic indicators include chronicity of symptoms, fibromyalgia and multiple concurrent orthopedic problems  Goals  1   Pt will be able to don/doff socks without difficulty  Achieved  2  Pt will be able to perform light house work including cooking/cleaning/laundry  Achieved but with persistent pain  3  Pt will be able to return to birding  No achieved  4  Pt will be able to get out of chair/car with ease  AChieved  5  Patient will be independent with home exercise program    6  Patient will be able to manage symptoms independently  New goal:  Pt will be fidelia to tend to garden    Plan  Plan details: Continue PT x2 additional weeks and progress to independent HEP as pt pain remains limiting factor and is not changing  Patient would benefit from: skilled physical therapy  Planned therapy interventions: neuromuscular re-education, strengthening, therapeutic activities, therapeutic exercise, functional ROM exercises, graded exercise, home exercise program and abdominal trunk stabilization  Frequency: 2x week  Duration in visits: 4  Duration in weeks: 2  Plan of Care beginning date: 4/25/2022  Plan of Care expiration date: 8/1/2022        Subjective Evaluation    History of Present Illness  Date of surgery: 2/22/2022  Mechanism of injury: Pt had a history of chronic low back pain and underwent surgical fusion L4-L5-S1 on 2/22/22  Pt was hospitalized until 2/25 due to anemia  Prior to the surgery she had chronic low back pain with right LE radiating pain and she c/o weakness  She was unable to walk for long periods and could not tolerate standing for long periods  She does report her pain has improved since the surgery  Currently she c/o occasional intermittent right LE pain to the bottom of the foot and right buttocks with a small focal area of numbness in the buttock  She reports left low back and  buttocks pain at times  She is now referred for OPT to increase LE strength  She initially had a 10# lifting restriction  She is using a bone stimulator 1xday for 30min    Pt's PMH is extensive but is significant for prior lumbar laminectomy  Patient's greatest concern is the pain she is experiencing and she would like to be able to go birding and move with less pain    Pt reports her leg pain is mostly gone but she has persistent back pain  She is having less episodes of high pain levels  She is able to do a little more around her house but still has difficulty with vacuuming and activities like cleaning her bathroom  7/15  Pt recently had an increase in left buttock and posterior thigh pain  She is very active in spite of her pain  She feels her back pain has improved from her fusion but did not change her pain in her leg  She still has difficulty vacuuming and completing household chores that require bending or heavy labor  Quality of life: fair    Pain  Current pain ratin  At worst pain ratin  Quality: dull ache and radiating  Progression: improved    Social Support  Steps to enter house: yes (1 large step)  Stairs in house: yes   Lives in: multiple-level home  Lives with: spouse    Patient Goals  Patient goals for therapy: increased strength and decreased pain  Patient goal: be able to go birding          Objective     Concurrent Complaints      Additional Special Questions  No red flags    Neurological Testing     Sensation     Lumbar   Left   Intact: light touch and pin prick    Right   Intact: light touch and pin prick    Reflexes   Left   Patellar (L4): trace (1+)    Right   Patellar (L4): trace (1+)    Active Range of Motion     Lumbar   Flexion:  WFL  Extension:  Restriction level: moderate  Left lateral flexion:  Restriction level: minimal  Right lateral flexion:  Restriction level: minimal  Left rotation:  Restriction level: minimal  Right rotation:  Restriction level: minimal    Strength/Myotome Testing     Left Hip   Planes of Motion   Flexion: 4  Abduction: 4+  Adduction: 5    Right Hip   Planes of Motion   Flexion: 4  Abduction: 4+  Adduction: 5    Left Knee   Flexion: 4+  Extension: 4+    Right Knee Flexion: 4+  Extension: 4+    Left Ankle/Foot   Dorsiflexion: 5  Plantar flexion: 4+    Right Ankle/Foot   Dorsiflexion: 5  Plantar flexion: 4+    Muscle Activation     Additional Muscle Activation Details  Improved activation of core musculature    Ambulation     Ambulation: Level Surfaces   Ambulation without assistive device: independent    Additional Level Surfaces Ambulation Details  Initially used walker post-surgery and then progressed sp cane  She currently is walking without AD but will use a cane if needed due to knee and hip pain    Ambulation: Stairs   Ascend stairs: independent  Pattern: non-reciprocal  Railings: one rail  Descend stairs: independent  Pattern: non-reciprocal  Railings: one rail    Additional Stairs Ambulation Details  Pt can inconsistently walk up stairs step over step but generally step to    General Comments:      Lumbar Comments  Incision well healed and no hypersensitivity              Precautions: fusion L4-S1, no BLT    TherEx 6/8 6/10 6/17 6/29 7/6 7/13 7/15 5/26 6/1 6/3    Ball ADD 30x 30x 30x 30x held 30x 30x 30x  30x 30x   Bent knee fallout 20x 20x ea  20x ea 20x ea held 30x 30x Black TB 20x ea  20x ea 20x ea    SAQ 2#/ 20x ea 2# 30x 2# 30x ea 2# 30x ea held 30x ea 2# 3# 3# 30x  3#/ 30 3# 30x   LAQ 2#/20x ea 2# 30x  2# 30x ea nv held 30x ea 2# 3# 3# 30x  3#/ 30 3# 30x   Nustep    7' L5 7' L5 7' L5 8' 8' 7' 9' 7'                Step up glute activation             standing row BTB 20x BTB 20x  BTB 20x BTB 20x BTB 20x 20x 20x 30x BTB  30x 30x   TB b should ext BTB 20x BTB 20x BTB 20x BTB 20x BTB 20x 20x 20x 30x BTB  30x 30x                             Neuro Re-Ed   6/17          ADIM 20x3" 3'' x20       20x  20x    supine hooklying multifidus heel press 20x ea 20x ea 20x ea 20x ea held   30x  30x 30x   prone glute activation 30x3" 3''x30 supine    30x supinebridge 20x 30x 30x  30x   Supine ADIM  march 20x ea 20x ea 20x ea nv held 2x10  10x 20x ea 20x ea    Multifidus press RTB 15x ea BTB 15x ea Black TB 20x ea Black TB 20x ea blk tb 20x   15x BTB  15x 15x ea   Standing ball press 20x 5" 5''x20  20x5"   20x  20x5'' 20x 20x                                                                                                                                      Ther Activity                                       Gait Training                                       Modalities             ice Post 10' NP

## 2022-07-15 NOTE — PROGRESS NOTES
PT Re-Evaluation     Today's date: 7/15/2022  Patient name: Mónica Radford  : 1955  MRN: [id number]  Referring provider: Joseph Doll PA-C  Dx:   Encounter Diagnosis     ICD-10-CM    1  Fusion of spine of lumbar region  M43 26    2  Chronic bilateral low back pain with bilateral sciatica  M54 42     M54 41     G89 29        Start Time: 1015  Stop Time: 1100  Total time in clinic (min): 45 minutes    Assessment  Assessment details: Mónica Radford is a pleasant 78 y o  female who presents with residual low back pain and weakness  The primary movement problem is LE weakness with poor core activation resulting in chronic back pain s/p lumbar fusion and limiting her ability to carry, lift, bend,perfrom housework or yard work,  walking, standing and don/doff socks  No further referral appears necessary at this time based upon examination results  The patient's greatest concerns are the pain she is experiencing and fear of not being able to keep active  Problem List:  1) LE weakness with poor glute activation-addressing with progressive strengthening exercises  2)core weakness with limited activation of TA and multifidus-addressing with neuromotor retraining  3) Chronic pain-addressing with progressive graded exercises      Etiologic factors include post-lumbar fusion  2022  Pt has improved AROM of the trunk  She is able to do more light activities at home  Her LE pain has dissipated but her back pain remains relatively unchanged but less frequent at chigger pain levels  She has difficulty walking for long periods  Her core activation and general strength is improving  Her exercise tolerance has increased  Her FOTO score improved compared to her initial assessment  At this time [facility] would benefit continued skilled physical therapy to achieve maximum functional potential     7/15  Pt to have follow-up with surgeon today  No change in pain   She continues to have pain radiating into her left buttock and posterior thigh  She has difficulty with heavy activities at home including vacuuming and cleaning her house  Her sitting tolerance <2 hours and her walking tolerance is a few city blocks  At this time we will continue PT for 2 weeks to ensure pt is independent with her exercises and progress to discharge  Impairments: abnormal or restricted ROM, activity intolerance, impaired balance, impaired physical strength, lacks appropriate home exercise program and pain with function  Functional limitations: lift, bend, carry, walk, stand, iadls, adlsPrognosis details: Positive prognostic indicators include positive attitude toward recovery  Negative prognostic indicators include chronicity of symptoms, fibromyalgia and multiple concurrent orthopedic problems  Goals  1  Pt will be able to don/doff socks without difficulty  Achieved  2  Pt will be able to perform light house work including cooking/cleaning/laundry  Achieved but with persistent pain  3  Pt will be able to return to birding  No achieved  4  Pt will be able to get out of chair/car with ease  AChieved  5  Patient will be independent with home exercise program    6  Patient will be able to manage symptoms independently  New goal:  Pt will be fidelia to tend to garden    Plan  Plan details: Continue PT x2 additional weeks and progress to independent HEP as pt pain remains limiting factor and is not changing    Patient would benefit from: skilled physical therapy  Planned therapy interventions: neuromuscular re-education, strengthening, therapeutic activities, therapeutic exercise, functional ROM exercises, graded exercise, home exercise program and abdominal trunk stabilization  Frequency: 2x week  Duration in visits: 4  Duration in weeks: 2  Plan of Care beginning date: 4/25/2022  Plan of Care expiration date: 8/1/2022        Subjective Evaluation    History of Present Illness  Date of surgery: 2/22/2022  Mechanism of injury: Pt had a history of chronic low back pain and underwent surgical fusion L4-L5-S1 on 22  Pt was hospitalized until  due to anemia  Prior to the surgery she had chronic low back pain with right LE radiating pain and she c/o weakness  She was unable to walk for long periods and could not tolerate standing for long periods  She does report her pain has improved since the surgery  Currently she c/o occasional intermittent right LE pain to the bottom of the foot and right buttocks with a small focal area of numbness in the buttock  She reports left low back and  buttocks pain at times  She is now referred for OPT to increase LE strength  She initially had a 10# lifting restriction  She is using a bone stimulator 1xday for 30min  Pt's PMH is extensive but is significant for prior lumbar laminectomy  Patient's greatest concern is the pain she is experiencing and she would like to be able to go birding and move with less pain    Pt reports her leg pain is mostly gone but she has persistent back pain  She is having less episodes of high pain levels  She is able to do a little more around her house but still has difficulty with vacuuming and activities like cleaning her bathroom  7/15  Pt recently had an increase in left buttock and posterior thigh pain  She is very active in spite of her pain  She feels her back pain has improved from her fusion but did not change her pain in her leg  She still has difficulty vacuuming and completing household chores that require bending or heavy labor  Quality of life: fair    Pain  Current pain ratin  At worst pain ratin  Quality: dull ache and radiating  Progression: improved    Social Support  Steps to enter house: yes (1 large step)  Stairs in house: yes   Lives in: multiple-level home  Lives with: spouse    Patient Goals  Patient goals for therapy: increased strength and decreased pain  Patient goal: be able to go birding          Objective     Concurrent Complaints      Additional Special Questions  No red flags    Neurological Testing     Sensation     Lumbar   Left   Intact: light touch and pin prick    Right   Intact: light touch and pin prick    Reflexes   Left   Patellar (L4): trace (1+)    Right   Patellar (L4): trace (1+)    Active Range of Motion     Lumbar   Flexion:  WFL  Extension:  Restriction level: moderate  Left lateral flexion:  Restriction level: minimal  Right lateral flexion:  Restriction level: minimal  Left rotation:  Restriction level: minimal  Right rotation:  Restriction level: minimal    Strength/Myotome Testing     Left Hip   Planes of Motion   Flexion: 4  Abduction: 4+  Adduction: 5    Right Hip   Planes of Motion   Flexion: 4  Abduction: 4+  Adduction: 5    Left Knee   Flexion: 4+  Extension: 4+    Right Knee   Flexion: 4+  Extension: 4+    Left Ankle/Foot   Dorsiflexion: 5  Plantar flexion: 4+    Right Ankle/Foot   Dorsiflexion: 5  Plantar flexion: 4+    Muscle Activation     Additional Muscle Activation Details  Improved activation of core musculature    Ambulation     Ambulation: Level Surfaces   Ambulation without assistive device: independent    Additional Level Surfaces Ambulation Details  Initially used walker post-surgery and then progressed sp cane  She currently is walking without AD but will use a cane if needed due to knee and hip pain    Ambulation: Stairs   Ascend stairs: independent  Pattern: non-reciprocal  Railings: one rail  Descend stairs: independent  Pattern: non-reciprocal  Railings: one rail    Additional Stairs Ambulation Details  Pt can inconsistently walk up stairs step over step but generally step to    General Comments:      Lumbar Comments  Incision well healed and no hypersensitivity              Precautions: fusion L4-S1, no BLT    TherEx 6/8 6/10 6/17 6/29 7/6 7/13 7/15 5/26 6/1 6/3    Ball ADD 30x 30x 30x 30x held 30x 30x 30x  30x 30x   Bent knee fallout 20x 20x ea  20x ea 20x ea held 30x 30x Black TB 20x ea  20x ea 20x ea    SAQ 2#/ 20x ea 2# 30x 2# 30x ea 2# 30x ea held 30x ea 2# 3# 3# 30x  3#/ 30 3# 30x   LAQ 2#/20x ea 2# 30x  2# 30x ea nv held 30x ea 2# 3# 3# 30x  3#/ 30 3# 30x   Nustep    7' L5 7' L5 7' L5 8' 8' 7' 9' 7'                Step up glute activation             standing row BTB 20x BTB 20x  BTB 20x BTB 20x BTB 20x 20x 20x 30x BTB  30x 30x   TB b should ext BTB 20x BTB 20x BTB 20x BTB 20x BTB 20x 20x 20x 30x BTB  30x 30x                             Neuro Re-Ed   6/17          ADIM 20x3" 3'' x20       20x  20x    supine hooklying multifidus heel press 20x ea 20x ea 20x ea 20x ea held   30x  30x 30x   prone glute activation 30x3" 3''x30 supine    30x supinebridge 20x 30x 30x  30x   Supine ADIM  march 20x ea 20x ea 20x ea nv held 2x10  10x 20x ea 20x ea    Multifidus press  RTB 15x ea BTB 15x ea Black TB 20x ea Black TB 20x ea blk tb 20x   15x BTB  15x 15x ea   Standing ball press 20x 5" 5''x20  20x5"   20x  20x5'' 20x 20x                                                                                                                                      Ther Activity                                       Gait Training                                       Modalities             ice Post 10' NP

## 2022-10-24 ENCOUNTER — OFFICE VISIT (OUTPATIENT)
Dept: DERMATOLOGY | Facility: CLINIC | Age: 67
End: 2022-10-24
Payer: MEDICARE

## 2022-10-24 VITALS — HEIGHT: 68 IN | BODY MASS INDEX: 36.37 KG/M2 | WEIGHT: 240 LBS

## 2022-10-24 DIAGNOSIS — L82.1 SEBORRHEIC KERATOSIS: ICD-10-CM

## 2022-10-24 DIAGNOSIS — D22.9 NEVUS: Primary | ICD-10-CM

## 2022-10-24 DIAGNOSIS — Z13.89 SCREENING FOR SKIN CONDITION: ICD-10-CM

## 2022-10-24 PROCEDURE — 99203 OFFICE O/P NEW LOW 30 MIN: CPT | Performed by: DERMATOLOGY

## 2022-10-24 NOTE — PROGRESS NOTES
[facility] DERMATOLOGY  [address]  [address] [id number]  [phone]  [phone]     MRN: [id number] : 1955  Encounter: [id number]  Patient Information: Cat Lindo  Chief complaint: skin exam and growths    History of present illness:  78year old female presents for overall skin check concerned regarding numerous growths on his skin no specific concerns or changes noted except that new lesions are developing  Past Medical History:   Diagnosis Date   • Arthritis    • Colon polyp    • CPAP (continuous positive airway pressure) dependence    • Fibromyalgia, primary    • GERD (gastroesophageal reflux disease)    • Hypertension    • Hypoglycemia    • Irritable bowel syndrome    • Liver disease    • Osteoporosis    • Pneumonia    • Rotator cuff tear, non-traumatic, right    • Sleep apnea    • Stenosis of cervical spine     and neck     Past Surgical History:   Procedure Laterality Date   • CARPAL TUNNEL RELEASE     • CATARACT EXTRACTION, BILATERAL      w/ repair of retina tear   • CHOLECYSTECTOMY     • COLONOSCOPY     • GASTRIC BYPASS     • HYSTERECTOMY     • KNEE SURGERY     • LUNG SURGERY      per patient "washing out"   • SINUS SURGERY     • TONSILLECTOMY       Social History   Social History     Substance and Sexual Activity   Alcohol Use Yes    Comment: occasional     Social History     Substance and Sexual Activity   Drug Use Not Currently     Social History     Tobacco Use   Smoking Status Former Smoker   • Quit date:    • Years since quittin 8   Smokeless Tobacco Never Used     Family History   Problem Relation Age of Onset   • Diabetes Mother    • Cancer Mother         lung cacer   • Kidney disease Mother    • Osteoarthritis Mother    • COPD Mother    • Blindness Mother    • Glaucoma Mother    • Macular degeneration Mother    • Kidney disease Father    • Heart disease Father    • Cancer Father         bladder cancer     Meds/Allergies Allergies   Allergen Reactions   • Shellfish-Derived Products - Food Allergy Anaphylaxis     Other reaction(s): Unknown   • Wound Dressing Adhesive Rash     Other reaction(s): rash  Other reaction(s): rash   • Hydrocodone Other (See Comments)     No longer   • Medical Tape    • Clarithromycin Rash       Meds:  Prior to Admission medications    Medication Sig Start Date End Date Taking?  Authorizing Provider   Ascorbic Acid (JC-C PO) 1   Yes Historical Provider, MD   cetirizine (ZyrTEC) 10 mg tablet Take 10 mg by mouth daily 11/12/19 10/24/22 Yes Historical Provider, MD   Cholecalciferol (VITAMIN D) 125 MCG (5000 UT) CAPS  2/21/20  Yes Historical Provider, MD   clonazePAM (KlonoPIN) 0 5 mg tablet clonazepam 0 5 mg tablet   Yes Historical Provider, MD   Cyanocobalamin 1000 MCG/ML KIT INJECT 1 ML AS DIRECTED EVERY 30 DAYS 2/21/20  Yes Historical Provider, MD   diclofenac sodium (VOLTAREN) 1 % Place on the skin 10/28/11  Yes Historical Provider, MD   dicyclomine (BENTYL) 20 mg tablet Take 1 tablet (20 mg total) by mouth every 6 (six) hours 1/7/20  Yes Oralee KELLY Wilson   DULoxetine (CYMBALTA) 60 mg delayed release capsule Take 60 mg by mouth daily   Yes Historical Provider, MD   Eluxadoline 75 MG TABS Take 1 tablet by mouth 2 (two) times a day   Yes Historical Provider, MD   esomeprazole (NexIUM) 40 MG capsule Take 1 capsule (40 mg total) by mouth 2 (two) times a day before meals 11/16/21  Yes Hillary KELLY Veliz   famotidine (PEPCID) 40 MG tablet TAKE 1 TABLET BY MOUTH EVERY DAY 9/27/21  Yes Sabael KELLY Veliz   fluticasone (FLONASE) 50 mcg/act nasal spray 2 sprays into each nostril daily 5/26/22  Yes Ameya Berry PA-C   folic acid (FOLVITE) 1 mg tablet folic acid 1 mg tablet   Yes Historical Provider, MD   gabapentin (NEURONTIN) 600 MG tablet gabapentin 600 mg tablet   Yes Historical Provider, MD   hydrochlorothiazide (HYDRODIURIL) 25 mg tablet hydrochlorothiazide 25 mg tablet   Yes Historical Provider, MD   lamoTRIgine (LaMICtal) 200 MG tablet  1/23/20  Yes Historical Provider, MD   losartan (COZAAR) 100 MG tablet Take by mouth 11/15/11  Yes Historical Provider, MD   Magnesium 300 MG CAPS Take 1 tablet by mouth daily 3/3/10  Yes Historical Provider, MD   meloxicam (MOBIC) 15 mg tablet  1/6/20  Yes Historical Provider, MD   potassium chloride (K-DUR,KLOR-CON) 10 mEq tablet Take 10 mEq by mouth   Yes Historical Provider, MD   rOPINIRole (REQUIP) 0 25 mg tablet ropinirole 0 25 mg tablet   Yes Historical Provider, MD   timolol (TIMOPTIC) 0 5 % ophthalmic solution INSTILL 1 DROP INTO LEFT EYE TWICE DAILY 4/28/20  Yes Historical Provider, MD   alosetron (LOTRONEX) 1 MG tablet Take 1 tablet (1 mg total) by mouth daily  Patient not taking: No sig reported 3/24/20   Ty Jerome PA-C   amoxicillin (AMOXIL) 500 mg capsule  4/29/21   Historical Provider, MD   aspirin 81 MG tablet Take 81 mg by mouth daily  Patient not taking: Reported on 10/24/2022    Historical Provider, MD   atoMOXetine (STRATTERA) 40 mg capsule atomoxetine 40 mg capsule  Patient not taking: Reported on 10/24/2022    Historical Provider, MD   busPIRone (BUSPAR) 10 mg tablet buspirone 10 mg tablet    Historical Provider, MD   diazepam (VALIUM) 5 mg tablet diazepam 5 mg tablet  Patient not taking: Reported on 10/24/2022    Historical Provider, MD   dorzolamide-timolol (COSOPT) 22 3-6 8 MG/ML ophthalmic solution dorzolamide 22 3 mg-timolol 6 8 mg/mL eye drops  Patient not taking: Reported on 10/24/2022    Historical Provider, MD   guaifenesin-codeine (GUAIFENESIN AC) 100-10 MG/5ML liquid Take by mouth  Patient not taking: No sig reported 3/12/12   Historical Provider, MD   Influenza Vac A&B SA Adj Quad (Fluad Quadrivalent) 0 5 ML PRSY Fluad Quad [id number](51FA up)(PF) 60 mcg (15 mcg x 4)/0 5mL IM syringe   PHARMACY ADMINISTERED  Patient not taking: Reported on 10/24/2022    Historical Provider, MD   ketoconazole (NIZORAL) 2 % shampoo ketoconazole 2 % shampoo  Patient not taking: No sig reported    Historical Provider, MD   methocarbamol (ROBAXIN) 500 mg tablet Take 500 mg by mouth 3 (three) times a day  Patient not taking: Reported on 10/24/2022 11/12/19   Historical Provider, MD   modafinil (PROVIGIL) 100 mg tablet modafinil 100 mg tablet    Historical Provider, MD   mometasone (ELOCON) 0 1 % lotion mometasone 0 1 % topical solution  Patient not taking: Reported on 10/24/2022    Historical Provider, MD   promethazine-dextromethorphan (PHENERGAN-DM) 6 25-15 mg/5 mL oral syrup Take by mouth  Patient not taking: No sig reported 2/17/12   Historical Provider, MD   sucralfate (CARAFATE) 1 g tablet TAKE 1 TABLET BY MOUTH FOUR TIMES A DAY  Patient not taking: No sig reported 3/15/21   DORETHA Mckinnon-C   tetanus-diphtheria-acellular pertussis (BOOSTRIX) injection Boostrix Tdap 2 5 Lf unit-8 mcg-5 Lf/0 5 mL intramuscular syringe  Patient not taking: Reported on 10/24/2022    Historical Provider, MD   tiZANidine (ZANAFLEX) 4 mg tablet Take by mouth  Patient not taking: No sig reported 12/16/11   Historical Provider, MD   Zoster Vac Recomb Adjuvanted 50 MCG/0 5ML SUSR Shingrix (PF) 50 mcg/0 5 mL intramuscular suspension, kit  Patient not taking: Reported on 10/24/2022    Historical Provider, MD       Subjective:     Review of Systems:    General: negative for - chills, fatigue, fever,  weight gain or weight loss  Psychological: negative for - anxiety, behavioral disorder, concentration difficulties, decreased libido, depression, irritability, memory difficulties, mood swings, sleep disturbances or suicidal ideation  ENT: negative for - hearing difficulties , nasal congestion, nasal discharge, oral lesions, sinus pain, sneezing, sore throat  Allergy and Immunology: negative for - hives, insect bite sensitivity,  Hematological and Lymphatic: negative for - bleeding problems, blood clots,bruising, swollen lymph nodes  Endocrine: negative for - hair pattern changes, hot flashes, malaise/lethargy, mood swings, palpitations, polydipsia/polyuria, skin changes, temperature intolerance or unexpected weight change  Respiratory: negative for - cough, hemoptysis, orthopnea, shortness of breath, or wheezing  Cardiovascular: negative for - chest pain, dyspnea on exertion, edema,  Gastrointestinal: negative for - abdominal pain, nausea/vomiting  Genito-Urinary: negative for - dysuria, incontinence, irregular/heavy menses or urinary frequency/urgency  Musculoskeletal: negative for - gait disturbance, joint pain, joint stiffness, joint swelling, muscle pain, muscular weakness  Dermatological:  As in HPI  Neurological: negative for confusion, dizziness, headaches, impaired coordination/balance, memory loss, numbness/tingling, seizures, speech problems, tremors or weakness       Objective:   Ht 5' 8" (1 727 m)   Wt 109 kg (240 lb)   BMI 36 49 kg/m²     Physical Exam:    General Appearance:    Alert, cooperative, no distress   Head:    Normocephalic, without obvious abnormality, atraumatic           Skin:   A full skin exam was performed including scalp, head scalp, eyes, ears, nose, lips, neck, chest, axilla, abdomen, back, buttocks, bilateral upper extremities, bilateral lower extremities, hands, feet, fingers, toes, fingernails, and toenails keratotic papule with greasy stuck appearance normal pigmented lesion regular shape color nothing markedly atypical noted complete exam     Assessment:     1  Nevus     2  Seborrheic keratosis     3  Screening for skin condition           Plan:   Nevi reviewed the concept of ABCDE and ugly duckling nothing markedly atypical patient reassured  Seborrheic Keratosis  Patient reasurred these are normal growths we acquire with age no treatment needed    Screening for Dermatologic Disorders: Nothing else of concern noted on complete exam follow up in 1 year     Clarissa Babcock  10/24/2022,3:48 PM    Portions of the record may have been created with voice recognition software   Occasional wrong word or "sound a like" substitutions may have occurred due to the inherent limitations of voice recognition software   Read the chart carefully and recognize, using context, where substitutions have occurred

## 2022-10-24 NOTE — PROGRESS NOTES
[facility] Note     Patient Name: Jordyn Felder  Encounter Date: October 24, 2022     Have you been cared for by a [place] [address] Dermatologist in the last 3 years and, if so, which description applies to you? NO  I am considered a "new" patient and must complete all patient intake questions  I am FEMALE/of child-bearing potential     REVIEW OF SYSTEMS:  Have you recently had or currently have any of the following? · Recent fever or chills? No  · Any non-healing wound? No  · Are you pregnant or planning to become pregnant? No  · Are you currently or planning to be nursing or breast feeding? No   PAST MEDICAL HISTORY:  Have you personally ever had or currently have any of the following? If "YES," then please provide more detail  · Skin cancer (such as Melanoma, Basal Cell Carcinoma, Squamous Cell Carcinoma? No  · Tuberculosis, HIV/AIDS, Hepatitis B or C: No  · Systemic Immunosuppression such as Diabetes, Biologic or Immunotherapy, Chemotherapy, Organ Transplantation, Bone Marrow Transplantation No  · Radiation Treatment No   FAMILY HISTORY:  Any "first degree relatives" (parent, brother, sister, or child) with the following? • Skin Cancer, Pancreatic or Other Cancer? YES, Melanoma - Maternal Arm, Brother - Skin Cancer on arm, Sister - BCC on Back, Mother - Lung Cancer, Paternal Grandmother - Cancer, Father - Bladder Cancer   PATIENT EXPERIENCE:    • Do you want the Dermatologist to perform a COMPLETE skin exam today including a clinical examination under the "bra and underwear" areas? Yes  • If necessary, do we have your permission to call and leave a detailed message on your Preferred Phone number that includes your specific medical information?   Yes      Allergies   Allergen Reactions   • Shellfish-Derived Products - Food Allergy Anaphylaxis     Other reaction(s): Unknown   • Wound Dressing Adhesive Rash     Other reaction(s): rash  Other reaction(s): rash   • Hydrocodone Other (See Comments)     No longer   • Medical Tape    • Clarithromycin Rash      Current Outpatient Medications:   •  alosetron (LOTRONEX) 1 MG tablet, Take 1 tablet (1 mg total) by mouth daily (Patient not taking: No sig reported), Disp: 30 tablet, Rfl: 1  •  amoxicillin (AMOXIL) 500 mg capsule, , Disp: , Rfl:   •  Ascorbic Acid (JC-C PO), 1, Disp: , Rfl:   •  aspirin 81 MG tablet, Take 81 mg by mouth daily, Disp: , Rfl:   •  atoMOXetine (STRATTERA) 40 mg capsule, atomoxetine 40 mg capsule, Disp: , Rfl:   •  busPIRone (BUSPAR) 10 mg tablet, buspirone 10 mg tablet, Disp: , Rfl:   •  cetirizine (ZyrTEC) 10 mg tablet, Take 10 mg by mouth daily, Disp: , Rfl:   •  Cholecalciferol (VITAMIN D) 125 MCG (5000 UT) CAPS, , Disp: , Rfl:   •  clonazePAM (KlonoPIN) 0 5 mg tablet, clonazepam 0 5 mg tablet, Disp: , Rfl:   •  Cyanocobalamin 1000 MCG/ML KIT, INJECT 1 ML AS DIRECTED EVERY 30 DAYS, Disp: , Rfl:   •  diazepam (VALIUM) 5 mg tablet, diazepam 5 mg tablet, Disp: , Rfl:   •  diclofenac sodium (VOLTAREN) 1 %, Place on the skin, Disp: , Rfl:   •  dicyclomine (BENTYL) 20 mg tablet, Take 1 tablet (20 mg total) by mouth every 6 (six) hours, Disp: 360 tablet, Rfl: 3  •  dorzolamide-timolol (COSOPT) 22 3-6 8 MG/ML ophthalmic solution, dorzolamide 22 3 mg-timolol 6 8 mg/mL eye drops, Disp: , Rfl:   •  DULoxetine (CYMBALTA) 60 mg delayed release capsule, Take 60 mg by mouth daily, Disp: , Rfl:   •  Eluxadoline 100 MG TABS, Take 1 tablet by mouth 2 (two) times a day, Disp: , Rfl:   •  esomeprazole (NexIUM) 40 MG capsule, Take 1 capsule (40 mg total) by mouth 2 (two) times a day before meals, Disp: 180 capsule, Rfl: 3  •  famotidine (PEPCID) 40 MG tablet, TAKE 1 TABLET BY MOUTH EVERY DAY, Disp: 90 tablet, Rfl: 1  •  fluticasone (FLONASE) 50 mcg/act nasal spray, 2 sprays into each nostril daily, Disp: 16 g, Rfl: 11  •  folic acid (FOLVITE) 1 mg tablet, folic acid 1 mg tablet, Disp: , Rfl:   •  gabapentin (NEURONTIN) 600 MG tablet, gabapentin 600 mg tablet, Disp: , Rfl:   •  guaifenesin-codeine (GUAIFENESIN AC) 100-10 MG/5ML liquid, Take by mouth (Patient not taking: No sig reported), Disp: , Rfl:   •  hydrochlorothiazide (HYDRODIURIL) 25 mg tablet, hydrochlorothiazide 25 mg tablet, Disp: , Rfl:   •  Influenza Vac A&B SA Adj Quad (Fluad Quadrivalent) 0 5 ML Ricky Safe Quad [id number](67CK up)(PF) 60 mcg (15 mcg x 4)/0 5mL IM syringe  PHARMACY ADMINISTERED, Disp: , Rfl:   •  ketoconazole (NIZORAL) 2 % shampoo, ketoconazole 2 % shampoo (Patient not taking: No sig reported), Disp: , Rfl:   •  lamoTRIgine (LaMICtal) 200 MG tablet, , Disp: , Rfl:   •  losartan (COZAAR) 100 MG tablet, Take by mouth, Disp: , Rfl:   •  Magnesium 300 MG CAPS, Take 1 tablet by mouth daily, Disp: , Rfl:   •  meloxicam (MOBIC) 15 mg tablet, , Disp: , Rfl:   •  methocarbamol (ROBAXIN) 500 mg tablet, Take 500 mg by mouth 3 (three) times a day, Disp: , Rfl:   •  modafinil (PROVIGIL) 100 mg tablet, modafinil 100 mg tablet, Disp: , Rfl:   •  mometasone (ELOCON) 0 1 % lotion, mometasone 0 1 % topical solution, Disp: , Rfl:   •  potassium chloride (K-DUR,KLOR-CON) 10 mEq tablet, Take 10 mEq by mouth, Disp: , Rfl:   •  promethazine-dextromethorphan (PHENERGAN-DM) 6 25-15 mg/5 mL oral syrup, Take by mouth (Patient not taking: No sig reported), Disp: , Rfl:   •  rOPINIRole (REQUIP) 0 25 mg tablet, ropinirole 0 25 mg tablet, Disp: , Rfl:   •  sucralfate (CARAFATE) 1 g tablet, TAKE 1 TABLET BY MOUTH FOUR TIMES A DAY (Patient not taking: No sig reported), Disp: 120 tablet, Rfl: 0  •  tetanus-diphtheria-acellular pertussis (BOOSTRIX) injection, Boostrix Tdap 2 5 Lf unit-8 mcg-5 Lf/0 5 mL intramuscular syringe, Disp: , Rfl:   •  timolol (TIMOPTIC) 0 5 % ophthalmic solution, INSTILL 1 DROP INTO LEFT EYE TWICE DAILY (Patient not taking: No sig reported), Disp: , Rfl:   •  tiZANidine (ZANAFLEX) 4 mg tablet, Take by mouth (Patient not taking: No sig reported), Disp: , Rfl:   •  Zoster Vac Recomb Adjuvanted 50 MCG/0 5ML SUSR, Shingrix (PF) 50 mcg/0 5 mL intramuscular suspension, kit, Disp: , Rfl:           • Whom besides the patient is providing clinical information about today's encounter?   o NO ADDITIONAL HISTORIAN (patient alone provided history)    Physical Exam and Assessment/Plan by Diagnosis:

## 2023-04-24 ENCOUNTER — TELEPHONE (OUTPATIENT)
Dept: GASTROENTEROLOGY | Facility: CLINIC | Age: 68
End: 2023-04-24

## 2023-04-24 NOTE — TELEPHONE ENCOUNTER
Pt needs a f/up visit with Joe Irving for medication review (Esomeprazole 40 mg) Last office visit was over 2 years - please call to schedule   Thanks

## 2023-04-24 NOTE — TELEPHONE ENCOUNTER
Spoke to patient    She will phone back and schedule an appt for medication review  Delilah Contreras

## 2023-07-05 ENCOUNTER — TELEPHONE (OUTPATIENT)
Age: 68
End: 2023-07-05

## 2023-07-05 NOTE — TELEPHONE ENCOUNTER
----- Message from Kyler More sent at 7/4/2023 10:49 PM EDT -----  Regarding: Fingers and heel of palms peeling  Contact: [phone]  My fingers and the outer portions of my palms are peeling. I am concerned and wondering what this is? It is not itchy. I would like an appointment to have this looked at.     Sincerely,  Kyler More  BD: [id number]

## 2023-07-12 ENCOUNTER — OFFICE VISIT (OUTPATIENT)
Age: 68
End: 2023-07-12
Payer: MEDICARE

## 2023-07-12 VITALS — WEIGHT: 240 LBS | BODY MASS INDEX: 36.37 KG/M2 | HEIGHT: 68 IN

## 2023-07-12 DIAGNOSIS — Z13.89 SCREENING FOR SKIN CONDITION: Primary | ICD-10-CM

## 2023-07-12 DIAGNOSIS — L24.9 IRRITANT DERMATITIS: ICD-10-CM

## 2023-07-12 PROCEDURE — 99213 OFFICE O/P EST LOW 20 MIN: CPT | Performed by: DERMATOLOGY

## 2023-07-12 NOTE — PROGRESS NOTES
[facility]     Patient Name: Claudene Shi  Encounter Date: 7/12/2023    Have you been cared for by a [facility] in the last 3 years and, if so, which description applies to you? Yes. I have been here within the last 3 years, and my medical history has NOT changed since that time. I am FEMALE/of child-bearing potential.    REVIEW OF SYSTEMS:  Have you recently had or currently have any of the following? · No changes in my recent health. PAST MEDICAL HISTORY:  Have you personally ever had or currently have any of the following? If "YES," then please provide more detail. · No changes in my medical history. FAMILY HISTORY:  Any "first degree relatives" (parent, brother, sister, or child) with the following? • No changes in my family's known health. PATIENT EXPERIENCE:    • Do you want the Dermatologist to perform a COMPLETE skin exam today including a clinical examination under the "bra and underwear" areas? NO  • If necessary, do we have your permission to call and leave a detailed message on your Preferred Phone number that includes your specific medical information?   Yes      Allergies   Allergen Reactions   • Shellfish-Derived Products - Food Allergy Anaphylaxis     Other reaction(s): Unknown   • Wound Dressing Adhesive Rash     Other reaction(s): rash  Other reaction(s): rash   • Hydrocodone Other (See Comments)     No longer   • Medical Tape    • Clarithromycin Rash      Current Outpatient Medications:   •  alosetron (LOTRONEX) 1 MG tablet, Take 1 tablet (1 mg total) by mouth daily, Disp: 30 tablet, Rfl: 1  •  Ascorbic Acid (JC-C PO), 1, Disp: , Rfl:   •  aspirin 81 MG tablet, Take 81 mg by mouth daily, Disp: , Rfl:   •  atoMOXetine (STRATTERA) 40 mg capsule, , Disp: , Rfl:   •  Cholecalciferol (VITAMIN D) 125 MCG (5000 UT) CAPS, , Disp: , Rfl:   •  clonazePAM (KlonoPIN) 0.5 mg tablet, clonazepam 0.5 mg tablet, Disp: , Rfl:   •  Cyanocobalamin 1000 MCG/ML KIT, INJECT 1 ML AS DIRECTED EVERY 30 DAYS, Disp: , Rfl:   •  diazepam (VALIUM) 5 mg tablet, , Disp: , Rfl:   •  diclofenac sodium (VOLTAREN) 1 %, Place on the skin, Disp: , Rfl:   •  dicyclomine (BENTYL) 20 mg tablet, Take 1 tablet (20 mg total) by mouth every 6 (six) hours, Disp: 360 tablet, Rfl: 3  •  dorzolamide-timolol (COSOPT) 22.3-6.8 MG/ML ophthalmic solution, , Disp: , Rfl:   •  DULoxetine (CYMBALTA) 60 mg delayed release capsule, Take 60 mg by mouth daily, Disp: , Rfl:   •  Eluxadoline 75 MG TABS, Take 1 tablet by mouth 2 (two) times a day, Disp: , Rfl:   •  esomeprazole (NexIUM) 40 MG capsule, Take 1 capsule (40 mg total) by mouth 2 (two) times a day before meals, Disp: 180 capsule, Rfl: 3  •  famotidine (PEPCID) 40 MG tablet, TAKE 1 TABLET BY MOUTH EVERY DAY, Disp: 90 tablet, Rfl: 1  •  fluticasone (FLONASE) 50 mcg/act nasal spray, SPRAY 2 SPRAYS INTO EACH NOSTRIL EVERY DAY, Disp: 48 mL, Rfl: 3  •  folic acid (FOLVITE) 1 mg tablet, folic acid 1 mg tablet, Disp: , Rfl:   •  gabapentin (NEURONTIN) 600 MG tablet, gabapentin 600 mg tablet, Disp: , Rfl:   •  hydrochlorothiazide (HYDRODIURIL) 25 mg tablet, hydrochlorothiazide 25 mg tablet, Disp: , Rfl:   •  Influenza Vac A&B SA Adj Quad (Fluad Quadrivalent) 0.5 ML PRSY, , Disp: , Rfl:   •  ketoconazole (NIZORAL) 2 % shampoo, ketoconazole 2 % shampoo, Disp: , Rfl:   •  lamoTRIgine (LaMICtal) 200 MG tablet, , Disp: , Rfl:   •  losartan (COZAAR) 100 MG tablet, Take by mouth, Disp: , Rfl:   •  Magnesium 300 MG CAPS, Take 1 tablet by mouth daily, Disp: , Rfl:   •  meloxicam (MOBIC) 15 mg tablet, , Disp: , Rfl:   •  methocarbamol (ROBAXIN) 500 mg tablet, Take 500 mg by mouth 3 (three) times a day, Disp: , Rfl:   •  mometasone (ELOCON) 0.1 % lotion, , Disp: , Rfl:   •  potassium chloride (K-DUR,KLOR-CON) 10 mEq tablet, Take 10 mEq by mouth, Disp: , Rfl:   •  rOPINIRole (REQUIP) 0.25 mg tablet, ropinirole 0.25 mg tablet, Disp: , Rfl:   •  timolol (TIMOPTIC) 0.5 % ophthalmic solution, INSTILL 1 DROP INTO LEFT EYE TWICE DAILY, Disp: , Rfl:   •  amoxicillin (AMOXIL) 500 mg capsule, , Disp: , Rfl:   •  busPIRone (BUSPAR) 10 mg tablet, buspirone 10 mg tablet, Disp: , Rfl:   •  cetirizine (ZyrTEC) 10 mg tablet, Take 10 mg by mouth daily, Disp: , Rfl:   •  guaifenesin-codeine (GUAIFENESIN AC) 100-10 MG/5ML liquid, Take by mouth (Patient not taking: No sig reported), Disp: , Rfl:   •  modafinil (PROVIGIL) 100 mg tablet, modafinil 100 mg tablet, Disp: , Rfl:   •  promethazine-dextromethorphan (PHENERGAN-DM) 6.25-15 mg/5 mL oral syrup, Take by mouth (Patient not taking: No sig reported), Disp: , Rfl:   •  sucralfate (CARAFATE) 1 g tablet, TAKE 1 TABLET BY MOUTH FOUR TIMES A DAY (Patient not taking: No sig reported), Disp: 120 tablet, Rfl: 0  •  tetanus-diphtheria-acellular pertussis (BOOSTRIX) injection, Boostrix Tdap 2.5 Lf unit-8 mcg-5 Lf/0.5 mL intramuscular syringe (Patient not taking: Reported on 10/24/2022), Disp: , Rfl:   •  tiZANidine (ZANAFLEX) 4 mg tablet, Take by mouth (Patient not taking: No sig reported), Disp: , Rfl:   •  Zoster Vac Recomb Adjuvanted 50 MCG/0.5ML SUSR, Shingrix (PF) 50 mcg/0.5 mL intramuscular suspension, kit (Patient not taking: Reported on 7/12/2023), Disp: , Rfl:           • Whom besides the patient is providing clinical information about today's encounter?   o NO ADDITIONAL HISTORIAN (patient alone provided history)     75year old female presents with a concern of dry skin/peeling in hands and feet. dryness in feet is more in the summer. Tried several hand creams without any relief. Physical Exam and Assessment/Plan by Diagnosis:    RASH    Physical Exam:  • (Anatomic Location); (Size and Morphological Description); (Differential Diagnosis):  o Hands and feet, minimal scaling and erythema.      Additional History of Present Condition:  Present for a couple of weeks  Assessment and Plan:  Based on a thorough discussion of this condition and the management approach to it (including a comprehensive discussion of the known risks, side effects and potential benefits of treatment), the patient (family) agrees to implement the following specific plan:             Process appears quite nonspecific pressure in the face that this is only occurred for the last couple weeks it could very well just be an irritant reaction or could be a mild form of eczema since it is improving no real intervention necessary except for moisturizers  • Reassurance and let us know if it recurs  • Moisturize with CeraVe cream          Scribe Attestation    I,:  Clive Harkins am acting as a scribe while in the presence of the attending physician.:       I,:  Aguila Andersen MD personally performed the services described in this documentation    as scribed in my presence.:

## 2023-10-18 ENCOUNTER — NURSE TRIAGE (OUTPATIENT)
Age: 68
End: 2023-10-18

## 2023-10-18 NOTE — TELEPHONE ENCOUNTER
An urgent apt was scheduled for Friday. Patient complains of increase in GERD symtpoms but also started with a right sided pain that started a few months ago. She is aware that if the pain get worse she should be seen and evaluated in the ED. She verbalized understanding             Reason for Disposition  • MILD pain (e.g., does not interfere with normal activities) and pain comes and goes (cramps) lasts > 48 hours (Exception: this same abdominal pain is a chronic symptom recurrent or ongoing AND present > 4 weeks)    Answer Assessment - Initial Assessment Questions  1. LOCATION: "Where does it hurt?"      Patient complains of pain on the right side of the abd.     3. ONSET: "When did the pain begin?" (e.g., minutes, hours or days ago) " couple of months. 5. PATTERN "Does the pain come and go, or is it constant?"     - If constant: "Is it getting better, staying the same, or worsening?"       (Note: Constant means the pain never goes away completely; most serious pain is constant and it progresses)      Comes and goes  6. SEVERITY: "How bad is the pain?"  (e.g., Scale 1-10; mild, moderate, or severe)  " I'm fine it's like a 4 or a 3 at night it's like an 8 or 9"   7.  RECURRENT SYMPTOM: "Have you ever had this type of stomach pain before?" If Yes, ask: "When was the last time?" and "What happened that time?"    New   8. CAUSE: "What do you think is causing the stomach pain?"    "The GERD is not good either"    Protocols used: Abdominal Pain - Female-ADULT-[place]

## 2023-10-19 RX ORDER — IBUPROFEN 800 MG/1
TABLET ORAL
COMMUNITY

## 2023-10-19 RX ORDER — HYDROCODONE BITARTRATE AND ACETAMINOPHEN 5; 325 MG/1; MG/1
TABLET ORAL
COMMUNITY

## 2023-10-19 RX ORDER — POTASSIUM CHLORIDE 750 MG/1
TABLET, FILM COATED, EXTENDED RELEASE ORAL
COMMUNITY

## 2023-10-19 RX ORDER — MONTELUKAST SODIUM 10 MG/1
TABLET ORAL
COMMUNITY

## 2023-10-19 RX ORDER — OXYBUTYNIN CHLORIDE 10 MG/1
TABLET, EXTENDED RELEASE ORAL
COMMUNITY

## 2023-10-19 RX ORDER — VIBEGRON 75 MG/1
TABLET, FILM COATED ORAL
COMMUNITY

## 2023-10-20 ENCOUNTER — TELEPHONE (OUTPATIENT)
Dept: GASTROENTEROLOGY | Facility: CLINIC | Age: 68
End: 2023-10-20

## 2023-10-20 ENCOUNTER — OFFICE VISIT (OUTPATIENT)
Dept: GASTROENTEROLOGY | Facility: CLINIC | Age: 68
End: 2023-10-20
Payer: MEDICARE

## 2023-10-20 VITALS
HEART RATE: 74 BPM | SYSTOLIC BLOOD PRESSURE: 114 MMHG | OXYGEN SATURATION: 96 % | DIASTOLIC BLOOD PRESSURE: 78 MMHG | WEIGHT: 243.8 LBS | BODY MASS INDEX: 36.95 KG/M2 | HEIGHT: 68 IN

## 2023-10-20 DIAGNOSIS — K58.2 IRRITABLE BOWEL SYNDROME WITH BOTH CONSTIPATION AND DIARRHEA: Primary | ICD-10-CM

## 2023-10-20 DIAGNOSIS — R10.84 GENERALIZED ABDOMINAL PAIN: ICD-10-CM

## 2023-10-20 DIAGNOSIS — K21.9 GASTROESOPHAGEAL REFLUX DISEASE WITHOUT ESOPHAGITIS: ICD-10-CM

## 2023-10-20 PROCEDURE — 99214 OFFICE O/P EST MOD 30 MIN: CPT | Performed by: INTERNAL MEDICINE

## 2023-10-20 RX ORDER — ESOMEPRAZOLE MAGNESIUM 40 MG/1
40 CAPSULE, DELAYED RELEASE ORAL
Qty: 31 CAPSULE | Refills: 6 | Status: SHIPPED | OUTPATIENT
Start: 2023-10-20

## 2023-10-20 RX ORDER — DICYCLOMINE HCL 20 MG
20 TABLET ORAL EVERY 6 HOURS
Qty: 93 TABLET | Refills: 6 | Status: SHIPPED | OUTPATIENT
Start: 2023-10-20

## 2023-10-20 RX ORDER — FAMOTIDINE 40 MG/1
40 TABLET, FILM COATED ORAL DAILY
Qty: 31 TABLET | Refills: 6 | Status: SHIPPED | OUTPATIENT
Start: 2023-10-20

## 2023-10-20 RX ORDER — ALBUTEROL SULFATE 90 UG/1
AEROSOL, METERED RESPIRATORY (INHALATION)
COMMUNITY
Start: 2023-09-04

## 2023-10-20 NOTE — TELEPHONE ENCOUNTER
Dr Shira Conklin pt  Patient asking for [employer] samples?  Please contact patient if we have any to give her

## 2023-10-22 NOTE — PROGRESS NOTES
[facility] - Outpatient Follow-up Note  Odalys Yeung 75 y.o. female MRN: [id number]  Encounter: [id number]          ASSESSMENT AND PLAN:      1. Irritable bowel syndrome with both constipation and diarrhea  - dicyclomine (BENTYL) 20 mg tablet; Take 1 tablet (20 mg total) by mouth every 6 (six) hours  Dispense: 93 tablet; Refill: 6  - CBC (Includes Diff/Plt) (Refl); Future  - Comprehensive metabolic panel; Future  -Low FODMAP diet was discussed with the patient in detail. 2. Generalized abdominal pain  - dicyclomine (BENTYL) 20 mg tablet; Take 1 tablet (20 mg total) by mouth every 6 (six) hours  Dispense: 93 tablet; Refill: 6  - CBC (Includes Diff/Plt) (Refl); Future  - Comprehensive metabolic panel; Future    3. Gastroesophageal reflux disease without esophagitis  - esomeprazole (NexIUM) 40 MG capsule; Take 1 capsule (40 mg total) by mouth daily before breakfast  Dispense: 31 capsule; Refill: 6  - famotidine (PEPCID) 40 MG tablet; Take 1 tablet (40 mg total) by mouth daily  Dispense: 31 tablet; Refill: 6    ______________________________________________________________________    SUBJECTIVE: Sophia Gaines is a 79-year-old female with a history of irritable bowel syndrome which is characterized mostly by diarrhea. She had undergone colonoscopy on September 13, 2019 which included biopsies throughout the colon that were negative for microscopic colitis. She continues to experience abdominal pain which is generalized in association with gaseousness. There is also gastroesophageal reflux disease symptomatology to include heartburn on occasion. She is currently not on probiotics. She is currently not on a fiber supplementation. She was on omeprazole in the past but she states that she ran out of this medication sometime ago. She is currently not on a low FODMAP diet      REVIEW OF SYSTEMS IS OTHERWISE NEGATIVE.       Historical Information   Past Medical History:   Diagnosis Date Arthritis     Colon polyp     CPAP (continuous positive airway pressure) dependence     Fibromyalgia, primary     GERD (gastroesophageal reflux disease)     Hypertension     Hypoglycemia     Irritable bowel syndrome     Liver disease     Osteoporosis     Pneumonia     Rotator cuff tear, non-traumatic, right     Sleep apnea     Stenosis of cervical spine     and neck     Past Surgical History:   Procedure Laterality Date    CARPAL TUNNEL RELEASE      CATARACT EXTRACTION, BILATERAL      w/ repair of retina tear    CHOLECYSTECTOMY      COLONOSCOPY      GASTRIC BYPASS      HYSTERECTOMY      KNEE SURGERY      LUNG SURGERY      per patient "washing out"    SINUS SURGERY      TONSILLECTOMY       Social History   Social History     Substance and Sexual Activity   Alcohol Use Yes    Comment: occasional     Social History     Substance and Sexual Activity   Drug Use Not Currently     Social History     Tobacco Use   Smoking Status Former    Types: Cigarettes    Quit date:     Years since quittin.8   Smokeless Tobacco Never     Family History   Problem Relation Age of Onset    Diabetes Mother     Cancer Mother         lung cacer    Kidney disease Mother     Osteoarthritis Mother     COPD Mother     Blindness Mother     Glaucoma Mother     Macular degeneration Mother     Kidney disease Father     Heart disease Father     Cancer Father         bladder cancer       Meds/Allergies       Current Outpatient Medications:     albuterol (PROVENTIL HFA,VENTOLIN HFA) 90 mcg/act inhaler    Ascorbic Acid (JC-C PO)    cetirizine (ZyrTEC) 10 mg tablet    Cholecalciferol (VITAMIN D) 125 MCG (5000 UT) CAPS    Cyanocobalamin 1000 MCG/ML KIT    diclofenac sodium (VOLTAREN) 1 %    dicyclomine (BENTYL) 20 mg tablet    dorzolamide-timolol (COSOPT) 22.3-6.8 MG/ML ophthalmic solution    esomeprazole (NexIUM) 40 MG capsule    esomeprazole (NexIUM) 40 MG capsule    famotidine (PEPCID) 40 MG tablet    famotidine (PEPCID) 40 MG tablet fluticasone (FLONASE) 50 mcg/act nasal spray    folic acid (FOLVITE) 1 mg tablet    gabapentin (NEURONTIN) 600 MG tablet    guaiFENesin (ROBITUSSIN) 100 mg/5 mL syrup    hydrochlorothiazide (HYDRODIURIL) 25 mg tablet    ibuprofen (MOTRIN) 800 mg tablet    Influenza Vac A&B SA Adj Quad (Fluad Quadrivalent) 0.5 ML PRSY    lamoTRIgine (LaMICtal) 200 MG tablet    losartan (COZAAR) 100 MG tablet    Magnesium 300 MG CAPS    meloxicam (MOBIC) 15 mg tablet    mometasone (ELOCON) 0.1 % lotion    montelukast (SINGULAIR) 10 mg tablet    potassium chloride (K-DUR,KLOR-CON) 10 mEq tablet    potassium chloride (Klor-Con) 10 mEq tablet    rOPINIRole (REQUIP) 0.25 mg tablet    timolol (TIMOPTIC) 0.5 % ophthalmic solution    Zoster Vac Recomb Adjuvanted 50 MCG/0.5ML SUSR    alosetron (LOTRONEX) 1 MG tablet    aspirin 81 MG tablet    atoMOXetine (STRATTERA) 40 mg capsule    clonazePAM (KlonoPIN) 0.5 mg tablet    diazepam (VALIUM) 5 mg tablet    dicyclomine (BENTYL) 20 mg tablet    Eluxadoline 75 MG TABS    HYDROcodone-acetaminophen (NORCO) 5-325 mg per tablet    ketoconazole (NIZORAL) 2 % shampoo    methocarbamol (ROBAXIN) 500 mg tablet    oxybutynin (DITROPAN-XL) 10 MG 24 hr tablet    tetanus-diphtheria-acellular pertussis (BOOSTRIX) injection    Vibegron (Gemtesa) 75 MG TABS    Allergies   Allergen Reactions    Shellfish-Derived Products - Food Allergy Anaphylaxis     Other reaction(s): Unknown    Wound Dressing Adhesive Rash     Other reaction(s): rash  Other reaction(s): rash    Medical Tape     Clarithromycin Rash           Objective     Blood pressure 114/78, pulse 74, height 5' 8" (1.727 m), weight 111 kg (243 lb 12.8 oz), SpO2 96 %. Body mass index is 37.07 kg/m². PHYSICAL EXAM:      General Appearance:   Alert, cooperative, no distress   HEENT:   Normocephalic, atraumatic, anicteric.      Neck:  Supple, symmetrical, trachea midline   Lungs:   Clear to auscultation bilaterally; no rales, rhonchi or wheezing; respirations unlabored    Heart[de-identified]   Regular rate and rhythm; no murmur, rub, or gallop. Abdomen:   Soft, non-tender, non-distended; normal bowel sounds; no masses, no organomegaly    Genitalia:   Deferred    Rectal:   Deferred    Extremities:  No cyanosis, clubbing or edema    Pulses:  2+ and symmetric    Skin:  No jaundice, rashes, or lesions    Lymph nodes:  No palpable cervical lymphadenopathy        Lab Results:   No visits with results within 1 Day(s) from this visit. Latest known visit with results is:   Office Visit on 05/04/2021   Component Date Value     RAPID STREP A 05/04/2021 Negative     Throat Culture 05/04/2021 Negative for beta-hemolytic Streptococcus     SARS-CoV-2 05/04/2021 Negative          Radiology Results:   No results found.

## 2023-10-24 NOTE — TELEPHONE ENCOUNTER
We do not currently have any [employer] samples. Rep will be bring us samples tomorrow for pt. Called and advised pt. Pt was recently seen by Dr Shaq Child in the office on 10/20/23. Hx. IBS with both constipation and diarrhea, Generalized abdominal pain and Gastro Reflux diease without esophagitis. Pt wants to know why onions and garlic is bad to eat. Also is there a difference between powder (dried) or mixing it in . Would it be easier to eat it or just not recommended to eat at all? Please advise. Thanks.

## 2023-10-25 ENCOUNTER — OFFICE VISIT (OUTPATIENT)
Age: 68
End: 2023-10-25
Payer: MEDICARE

## 2023-10-25 ENCOUNTER — APPOINTMENT (OUTPATIENT)
Dept: LAB | Facility: CLINIC | Age: 68
End: 2023-10-25
Payer: MEDICARE

## 2023-10-25 VITALS — WEIGHT: 243 LBS | BODY MASS INDEX: 36.83 KG/M2 | TEMPERATURE: 98.2 F | HEIGHT: 68 IN

## 2023-10-25 DIAGNOSIS — K58.2 IRRITABLE BOWEL SYNDROME WITH BOTH CONSTIPATION AND DIARRHEA: ICD-10-CM

## 2023-10-25 DIAGNOSIS — D22.9 NEVUS: ICD-10-CM

## 2023-10-25 DIAGNOSIS — D18.01 CHERRY ANGIOMA: ICD-10-CM

## 2023-10-25 DIAGNOSIS — Z13.89 SCREENING FOR SKIN CONDITION: Primary | ICD-10-CM

## 2023-10-25 DIAGNOSIS — R10.84 GENERALIZED ABDOMINAL PAIN: ICD-10-CM

## 2023-10-25 DIAGNOSIS — L82.1 SEBORRHEIC KERATOSIS: ICD-10-CM

## 2023-10-25 LAB
ALBUMIN SERPL BCP-MCNC: 4.2 G/DL (ref 3.5–5)
ALP SERPL-CCNC: 117 U/L (ref 34–104)
ALT SERPL W P-5'-P-CCNC: 19 U/L (ref 7–52)
ANION GAP SERPL CALCULATED.3IONS-SCNC: 7 MMOL/L
AST SERPL W P-5'-P-CCNC: 16 U/L (ref 13–39)
BASOPHILS # BLD AUTO: 0.03 THOUSANDS/ÂΜL (ref 0–0.1)
BASOPHILS NFR BLD AUTO: 1 % (ref 0–1)
BILIRUB SERPL-MCNC: 0.8 MG/DL (ref 0.2–1)
BUN SERPL-MCNC: 22 MG/DL (ref 5–25)
CALCIUM SERPL-MCNC: 9.6 MG/DL (ref 8.4–10.2)
CHLORIDE SERPL-SCNC: 103 MMOL/L (ref 96–108)
CO2 SERPL-SCNC: 30 MMOL/L (ref 21–32)
CREAT SERPL-MCNC: 0.86 MG/DL (ref 0.6–1.3)
EOSINOPHIL # BLD AUTO: 0.16 THOUSAND/ÂΜL (ref 0–0.61)
EOSINOPHIL NFR BLD AUTO: 3 % (ref 0–6)
ERYTHROCYTE [DISTWIDTH] IN BLOOD BY AUTOMATED COUNT: 14.1 % (ref 11.6–15.1)
GFR SERPL CREATININE-BSD FRML MDRD: 69 ML/MIN/1.73SQ M
GLUCOSE P FAST SERPL-MCNC: 101 MG/DL (ref 65–99)
HCT VFR BLD AUTO: 40.8 % (ref 34.8–46.1)
HGB BLD-MCNC: 12.6 G/DL (ref 11.5–15.4)
IMM GRANULOCYTES # BLD AUTO: 0.02 THOUSAND/UL (ref 0–0.2)
IMM GRANULOCYTES NFR BLD AUTO: 0 % (ref 0–2)
LYMPHOCYTES # BLD AUTO: 1.8 THOUSANDS/ÂΜL (ref 0.6–4.47)
LYMPHOCYTES NFR BLD AUTO: 34 % (ref 14–44)
MCH RBC QN AUTO: 28.1 PG (ref 26.8–34.3)
MCHC RBC AUTO-ENTMCNC: 30.9 G/DL (ref 31.4–37.4)
MCV RBC AUTO: 91 FL (ref 82–98)
MONOCYTES # BLD AUTO: 0.38 THOUSAND/ÂΜL (ref 0.17–1.22)
MONOCYTES NFR BLD AUTO: 7 % (ref 4–12)
NEUTROPHILS # BLD AUTO: 2.88 THOUSANDS/ÂΜL (ref 1.85–7.62)
NEUTS SEG NFR BLD AUTO: 55 % (ref 43–75)
NRBC BLD AUTO-RTO: 0 /100 WBCS
PLATELET # BLD AUTO: 231 THOUSANDS/UL (ref 149–390)
PMV BLD AUTO: 11.8 FL (ref 8.9–12.7)
POTASSIUM SERPL-SCNC: 4.2 MMOL/L (ref 3.5–5.3)
PROT SERPL-MCNC: 6.5 G/DL (ref 6.4–8.4)
RBC # BLD AUTO: 4.48 MILLION/UL (ref 3.81–5.12)
SODIUM SERPL-SCNC: 140 MMOL/L (ref 135–147)
WBC # BLD AUTO: 5.27 THOUSAND/UL (ref 4.31–10.16)

## 2023-10-25 PROCEDURE — 99213 OFFICE O/P EST LOW 20 MIN: CPT | Performed by: DERMATOLOGY

## 2023-10-25 PROCEDURE — 85025 COMPLETE CBC W/AUTO DIFF WBC: CPT

## 2023-10-25 PROCEDURE — 80053 COMPREHEN METABOLIC PANEL: CPT

## 2023-10-25 PROCEDURE — 36415 COLL VENOUS BLD VENIPUNCTURE: CPT

## 2023-10-25 RX ORDER — KETOCONAZOLE 20 MG/ML
SHAMPOO TOPICAL
Status: CANCELLED | OUTPATIENT
Start: 2023-10-25

## 2023-10-25 NOTE — TELEPHONE ENCOUNTER
Connie Tolliver PA-C  You22 hours ago (0:44 PM)     Garlic and onion in any form is high in fructans which are not absorbed well by the small bowel and therefore ferment and release large amounts of gas

## 2023-10-25 NOTE — PATIENT INSTRUCTIONS
MELANOCYTIC NEVI ("Moles")      Melanocytic nevi ("moles") are tan or brown, raised or flat areas of the skin which have an increased number of melanocytes. Melanocytes are the cells in our body which make pigment and account for skin color. Some moles are present at birth (I.e., "congenital nevi"), while others come up later in life (i.e., "acquired nevi"). The sun can stimulate the body to make more moles. Sunburns are not the only thing that triggers more moles. Chronic sun exposure can do it too. Clinically distinguishing a healthy mole from melanoma may be difficult, even for experienced dermatologists. The "ABCDE's" of moles have been suggested as a means of helping to alert a person to a suspicious mole and the possible increased risk of melanoma. The suggestions for raising alert are as follows:    Asymmetry: Healthy moles tend to be symmetric, while melanomas are often asymmetric. Asymmetry means if you draw a line through the mole, the two halves do not match in color, size, shape, or surface texture. Asymmetry can be a result of rapid enlargement of a mole, the development of a raised area on a previously flat lesion, scaling, ulceration, bleeding or scabbing within the mole. Any mole that starts to demonstrate "asymmetry" should be examined promptly by a board certified dermatologist.     Border: Healthy moles tend to have discrete, even borders. The border of a melanoma often blends into the normal skin and does not sharply delineate the mole from normal skin. Any mole that starts to demonstrate "uneven borders" should be examined promptly by a board certified dermatologist.     Color: Healthy moles tend to be one color throughout. Melanomas tend to be made up of different colors ranging from dark black, blue, white, or red.   Any mole that demonstrates a color change should be examined promptly by a board certified dermatologist.     Diameter: Healthy moles tend to be smaller than 0.6 cm in size; an exception are "congenital nevi" that can be larger. Melanomas tend to grow and can often be greater than 0.6 cm (1/4 of an inch, or the size of a pencil eraser). This is only a guideline, and many normal moles may be larger than 0.6 cm without being unhealthy. Any mole that starts to change in size (small to bigger or bigger to smaller) should be examined promptly by a board certified dermatologist.     Evolving: Healthy moles tend to "stay the same."  Melanomas may often show signs of change or evolution such as a change in size, shape, color, or elevation. Any mole that starts to itch, bleed, crust, burn, hurt, or ulcerate or demonstrate a change or evolution should be examined promptly by a board certified dermatologist.      Dysplastic Nevi  Dysplastic moles are moles that fit the ABCDE rules of melanoma but are not identified as melanomas when examined under the microscope. They may indicate an increased risk of melanoma in that person. If there is a family history of melanoma, most experts agree that the person may be at an increased risk for developing a melanoma. Experts still do not agree on what dysplastic moles mean in patients without a personal or family history of melanoma. Dysplastic moles are usually larger than common moles and have different colors within it with irregular borders. The appearance can be very similar to a melanoma. Biopsies of dysplastic moles may show abnormalities which are different from a regular mole. Melanoma  Malignant melanoma is a type of skin cancer that can be deadly if it spreads throughout the body. The incidence of melanoma in the [facility] is growing faster than any other cancer. Melanoma usually grows near the surface of the skin for a period of time, and then begins to grow deeper into the skin. Once it grows deeper into the skin, the risk of spread to other organs greatly increases.  Therefore, early detection and removal of a malignant melanoma may result in a better chance at a complete cure; removal after the tumor has spread may not be as effective, leading to worse clinical outcomes such as death. The true rate of nevus transformation into a melanoma is unknown. It has been estimated that the lifetime risk for any acquired melanocytic nevus on any 21year-old individual transforming into melanoma by age 80 is 0.03% (1 in 3,164) for men and 0.009% (1 in 10,800) for women. The appearance of a "new mole" remains one of the most reliable methods for identifying a malignant melanoma. Occasionally, melanomas appear as rapidly growing, blue-black, dome-shaped bumps within a previous mole or previous area of normal skin. Other times, melanomas are suspected when a mole suddenly appears or changes. Itching, burning, or pain in a pigmented lesion should increase suspicion, but most patients with early melanoma have no skin discomfort whatsoever. Melanoma can occur anywhere on the skin, including areas that are difficult for self-examination. Many melanomas are first noticed by other family members. Suspicious-looking moles may be removed for microscopic examination. You may be able to prevent death from melanoma by doing two simple things:    Try to avoid unnecessary sun exposure and protect your skin when it is exposed to the sun. People who live near the equator, people who have intermittent exposures to large amounts of sun, and people who have had sunburns in childhood or adolescence have an increased risk for melanoma. Sun sense and vigilant sun protection may be keys to helping to prevent melanoma. We recommend wearing UPF-rated sun protective clothing and sunglasses whenever possible and applying a moisturizer-sunscreen combination product (SPF 50+) such as Neutrogena Daily Defense to sun exposed areas of skin at least three times a day.     Have your moles regularly examined by a board certified dermatologist AND by yourself or a family member/friend at home. We recommend that you have your moles examined at least once a year by a board certified dermatologist.  Use your birthday as an annual reminder to have your "Birthday Suit" (I.e., your skin) examined; it is a nice birthday gift to yourself to know that your skin is healthy appearing! Additionally, at-home self examinations may be helpful for detecting a possible melanoma. Use the ABCDEs we discussed and check your moles once a month at home. SEBORRHEIC KERATOSIS  A seborrheic keratosis is a harmless warty spot that appears during adult life as a common sign of skin aging. Seborrheic keratoses can arise on any area of skin, covered or uncovered, with the usual exception of the palms and soles. They do not arise from mucous membranes. Seborrheic keratoses can have highly variable appearance. Seborrheic keratoses are extremely common. It has been estimated that over 90% of adults over the age of 61 years have one or more of them. They occur in males and females of all races, typically beginning to erupt in the 35s or 45s. They are uncommon under the age of 21 years. The precise cause of seborrhoeic keratoses is not known. Seborrhoeic keratoses are considered degenerative in nature. As time goes by, seborrheic keratoses tend to become more numerous. Some people inherit a tendency to develop a very large number of them; some people may have hundreds of them. The name "seborrheic keratosis" is misleading, because these lesions are not limited to a seborrhoeic distribution (scalp, mid-face, chest, upper back), nor are they formed from sebaceous glands, nor are they associated with sebum -- which is greasy. Seborrheic keratosis may also be called "SK," "Seb K," "basal cell papilloma," "senile wart," or "barnacle."      There is no easy way to remove multiple lesions on a single occasion.   Unless a specific lesion is "inflamed" and is causing pain or stinging/burning or is bleeding, most insurance companies do not authorize treatment. ANGIOMA ("CHERRY ANGIOMA")  [place] angiomas markedly increase in number from about the age of 36, so it has been estimated that 75% of people over 76years of age have them. Although they also called "senile angiomas," they can occur in young people too - 5% of adolescents have been found to have them. Cherry angiomas are very common in males and females of any age or race, with no difference in sexes or races affected. They are however more noticeable in white skin than in skin of colour. There may be a family history of similar lesions. Eruptive (very large number appearing in a short period of time) cherry angiomas have been rarely reported to be associated with internal malignancy and pregnancy.

## 2023-10-25 NOTE — PROGRESS NOTES
[facility] Note     Patient Name: Marlo Mederos  Encounter Date: 10/25/2023     Have you been cared for by a [facility] in the last 3 years and, if so, which description applies to you? Yes. I have been here within the last 3 years, and my medical history has NOT changed since that time. I am FEMALE/of child-bearing potential.    REVIEW OF SYSTEMS:  Have you recently had or currently have any of the following? No changes in my recent health. PAST MEDICAL HISTORY:  Have you personally ever had or currently have any of the following? If "YES," then please provide more detail. No changes in my medical history. HISTORY OF IMMUNOSUPPRESSION: Do you have a history of any of the following:  Systemic Immunosuppression such as Diabetes, Biologic or Immunotherapy, Chemotherapy, Organ Transplantation, Bone Marrow Transplantation? No     Answering "YES" requires the addition of the dotphrase "IMMUNOSUPPRESSED" as the first diagnosis of the patient's visit. FAMILY HISTORY:  Any "first degree relatives" (parent, brother, sister, or child) with the following? No changes in my family's known health. PATIENT EXPERIENCE:    Do you want the Dermatologist to perform a COMPLETE skin exam today including a clinical examination under the "bra and underwear" areas? Yes  If necessary, do we have your permission to call and leave a detailed message on your Preferred Phone number that includes your specific medical information?   Yes      Allergies   Allergen Reactions    Shellfish-Derived Products - Food Allergy Anaphylaxis     Other reaction(s): Unknown    Wound Dressing Adhesive Rash     Other reaction(s): rash  Other reaction(s): rash    Medical Tape     Clarithromycin Rash      Current Outpatient Medications:     albuterol (PROVENTIL HFA,VENTOLIN HFA) 90 mcg/act inhaler, INHALE 2 PUFFS EVERY 6 HOURS AS NEEDED FOR WHEEZING, Disp: , Rfl:     alosetron (LOTRONEX) 1 MG tablet, Take 1 tablet (1 mg total) by mouth daily, Disp: 30 tablet, Rfl: 1    Ascorbic Acid (JC-C PO), 1, Disp: , Rfl:     aspirin 81 MG tablet, Take 81 mg by mouth daily (Patient not taking: Reported on 10/20/2023), Disp: , Rfl:     atoMOXetine (STRATTERA) 40 mg capsule, , Disp: , Rfl:     cetirizine (ZyrTEC) 10 mg tablet, Take 10 mg by mouth daily, Disp: , Rfl:     Cholecalciferol (VITAMIN D) 125 MCG (5000 UT) CAPS, , Disp: , Rfl:     clonazePAM (KlonoPIN) 0.5 mg tablet, clonazepam 0.5 mg tablet (Patient not taking: Reported on 10/20/2023), Disp: , Rfl:     Cyanocobalamin 1000 MCG/ML KIT, INJECT 1 ML AS DIRECTED EVERY 30 DAYS, Disp: , Rfl:     diazepam (VALIUM) 5 mg tablet, , Disp: , Rfl:     diclofenac sodium (VOLTAREN) 1 %, Place on the skin, Disp: , Rfl:     dicyclomine (BENTYL) 20 mg tablet, Take 1 tablet (20 mg total) by mouth every 6 (six) hours (Patient not taking: Reported on 10/20/2023), Disp: 360 tablet, Rfl: 3    dicyclomine (BENTYL) 20 mg tablet, Take 1 tablet (20 mg total) by mouth every 6 (six) hours, Disp: 93 tablet, Rfl: 6    dorzolamide-timolol (COSOPT) 22.3-6.8 MG/ML ophthalmic solution, , Disp: , Rfl:     Eluxadoline 75 MG TABS, Take 1 tablet by mouth 2 (two) times a day (Patient not taking: Reported on 10/20/2023), Disp: , Rfl:     esomeprazole (NexIUM) 40 MG capsule, Take 1 capsule (40 mg total) by mouth 2 (two) times a day before meals, Disp: 180 capsule, Rfl: 3    esomeprazole (NexIUM) 40 MG capsule, Take 1 capsule (40 mg total) by mouth daily before breakfast, Disp: 31 capsule, Rfl: 6    famotidine (PEPCID) 40 MG tablet, TAKE 1 TABLET BY MOUTH EVERY DAY, Disp: 90 tablet, Rfl: 1    famotidine (PEPCID) 40 MG tablet, Take 1 tablet (40 mg total) by mouth daily, Disp: 31 tablet, Rfl: 6    fluticasone (FLONASE) 50 mcg/act nasal spray, SPRAY 2 SPRAYS INTO EACH NOSTRIL EVERY DAY, Disp: 48 mL, Rfl: 3    folic acid (FOLVITE) 1 mg tablet, folic acid 1 mg tablet, Disp: , Rfl:     gabapentin (NEURONTIN) 600 MG tablet, gabapentin 600 mg tablet, Disp: , Rfl:     guaiFENesin (ROBITUSSIN) 100 mg/5 mL syrup, prn, Disp: , Rfl:     hydrochlorothiazide (HYDRODIURIL) 25 mg tablet, hydrochlorothiazide 25 mg tablet, Disp: , Rfl:     HYDROcodone-acetaminophen (NORCO) 5-325 mg per tablet, hydrocodone 5 mg-acetaminophen 325 mg tablet  TAKE 1 TABLET EVERY 6 HOURS BY ORAL ROUTE AS DIRECTED.  (Patient not taking: Reported on 10/20/2023), Disp: , Rfl:     ibuprofen (MOTRIN) 800 mg tablet, prn, Disp: , Rfl:     Influenza Vac A&B SA Adj Quad (Fluad Quadrivalent) 0.5 ML PRSY, , Disp: , Rfl:     ketoconazole (NIZORAL) 2 % shampoo, ketoconazole 2 % shampoo (Patient not taking: Reported on 10/20/2023), Disp: , Rfl:     lamoTRIgine (LaMICtal) 200 MG tablet, , Disp: , Rfl:     losartan (COZAAR) 100 MG tablet, Take by mouth, Disp: , Rfl:     Magnesium 300 MG CAPS, Take 1 tablet by mouth daily, Disp: , Rfl:     meloxicam (MOBIC) 15 mg tablet, , Disp: , Rfl:     methocarbamol (ROBAXIN) 500 mg tablet, Take 500 mg by mouth 3 (three) times a day (Patient not taking: Reported on 10/20/2023), Disp: , Rfl:     mometasone (ELOCON) 0.1 % lotion, , Disp: , Rfl:     montelukast (SINGULAIR) 10 mg tablet, TAKE 1 TABLET BY MOUTH EVERY DAY AT NIGHT, Disp: , Rfl:     oxybutynin (DITROPAN-XL) 10 MG 24 hr tablet, , Disp: , Rfl:     potassium chloride (K-DUR,KLOR-CON) 10 mEq tablet, Take 10 mEq by mouth, Disp: , Rfl:     potassium chloride (Klor-Con) 10 mEq tablet, potassium chloride ER 10 mEq tablet,extended release, Disp: , Rfl:     rOPINIRole (REQUIP) 0.25 mg tablet, ropinirole 0.25 mg tablet, Disp: , Rfl:     tetanus-diphtheria-acellular pertussis (BOOSTRIX) injection, Boostrix Tdap 2.5 Lf unit-8 mcg-5 Lf/0.5 mL intramuscular syringe (Patient not taking: Reported on 10/24/2022), Disp: , Rfl:     timolol (TIMOPTIC) 0.5 % ophthalmic solution, INSTILL 1 DROP INTO LEFT EYE TWICE DAILY, Disp: , Rfl:     Vibegron (Gemtesa) 75 MG TABS, , Disp: , Rfl:     Zoster Vac Recomb Adjuvanted 50 MCG/0.5ML SUSR, , Disp: , Rfl:           Whom besides the patient is providing clinical information about today's encounter? Parent/Guardian provided history (due to age/developmental stage of patient)    Physical Exam and Assessment/Plan by Diagnosis:      MELANOCYTIC NEVI ("Moles")    Physical Exam:  Anatomic Location Affected: Mostly on sun-exposed areas of the body. Morphological Description:  Scattered, 1-4mm round to ovoid, symmetrical-appearing, even bordered, skin colored to dark brown macules/papules, mostly in sun-exposed areas  Pertinent Positives:  Pertinent Negatives: Additional History of Present Condition:  present on exam     Assessment and Plan:  Based on a thorough discussion of this condition and the management approach to it (including a comprehensive discussion of the known risks, side effects and potential benefits of treatment), the patient (family) agrees to implement the following specific plan:  Provided handout with information regarding the ABCDE's of moles   Recommend routine skin exams every year. Sun avoidance, protective clothing (known as UPF clothing), and the use of at least SPF 30 sunscreens is advised. Sunscreen should be reapplied every two hours when outside. SEBORRHEIC KERATOSIS; NON-INFLAMED    Physical Exam:  Anatomic Location Affected:  scattered across trunk, extremities,  face  Morphological Description:  Flat and raised, waxy, smooth to warty textured, yellow to brownish-grey to dark brown to blackish, discrete, "stuck-on" appearing papules. Pertinent Positives:  Pertinent Negatives: Additional History of Present Condition:  Patient reports new bumps on the skin. Denies itch, burn, pain, bleeding or ulceration. Present constantly; nothing seems to make it worse or better. No prior treatment.       Assessment and Plan:  Based on a thorough discussion of this condition and the management approach to it (including a comprehensive discussion of the known risks, side effects and potential benefits of treatment), the patient (family) agrees to implement the following specific plan:  Reassured benign        ANGIOMA ("CHERRY ANGIOMA")    Physical Exam:  Anatomic Location: scattered across sun exposed areas of the trunk and extremities   Morphologic Description: Firm red to reddish-blue discrete papules  Pertinent Positives:  Pertinent Negatives:     Additional History of Present Condition:  Present on exam.     Assessment and Plan:  Reassured benign           Scribe Attestation      I,:  Dani am acting as a scribe while in the presence of the attending physician.:       I,:  Yesica Yarbrough MD personally performed the services described in this documentation    as scribed in my presence.:

## 2023-10-30 ENCOUNTER — TELEPHONE (OUTPATIENT)
Dept: GASTROENTEROLOGY | Facility: CLINIC | Age: 68
End: 2023-10-30

## 2023-10-30 NOTE — TELEPHONE ENCOUNTER
----- Message from Tomasz Jacob DO sent at 10/29/2023 10:35 AM EDT -----  Please call the patient with the chemistry panel results. The chemistry panel result shows a mildly increased glucose level 101 and an alkaline phosphatase level which is mildly increased to 117. There is no further blood testing that is required at this time.

## 2023-11-06 ENCOUNTER — NURSE TRIAGE (OUTPATIENT)
Age: 68
End: 2023-11-06

## 2023-11-06 NOTE — TELEPHONE ENCOUNTER
Regarding: gerd  ----- Message from Talia Drew MA sent at 11/6/2023 11:30 AM EST -----  Pt called in stating that pt started taking new meds given by dr Ron Choudhary with no relief for gerd.  Pt asking for a call back

## 2023-11-06 NOTE — TELEPHONE ENCOUNTER
SPOKE WITH PT, C/O CONSTANT HEARTBURN DESPITE ESOMEPRAZOLE 40 MG QD AND FAMOTIDINE 40MG AT HS. NO RELIEF IN SYMPTOM SINCE OFFICE VISIT. DENIES FEVER, CHILLS, N/V, IBS SYMPTOMS ARE WELL CONTROLLED WITH FODMAP DIET AND DICYCLOMINE. WHAT DO YOU RECOMMEND?

## 2023-11-13 DIAGNOSIS — K21.9 GASTROESOPHAGEAL REFLUX DISEASE WITHOUT ESOPHAGITIS: ICD-10-CM

## 2023-11-13 RX ORDER — FAMOTIDINE 40 MG/1
40 TABLET, FILM COATED ORAL DAILY
Qty: 90 TABLET | Refills: 1 | Status: SHIPPED | OUTPATIENT
Start: 2023-11-13

## 2023-11-13 RX ORDER — ESOMEPRAZOLE MAGNESIUM 40 MG/1
40 CAPSULE, DELAYED RELEASE ORAL
Qty: 90 CAPSULE | Refills: 1 | Status: SHIPPED | OUTPATIENT
Start: 2023-11-13

## 2023-11-16 ENCOUNTER — EVALUATION (OUTPATIENT)
Dept: PHYSICAL THERAPY | Age: 68
End: 2023-11-16
Payer: MEDICARE

## 2023-11-16 DIAGNOSIS — M17.11 PRIMARY OSTEOARTHRITIS OF RIGHT KNEE: Primary | ICD-10-CM

## 2023-11-16 DIAGNOSIS — M25.561 ACUTE PAIN OF RIGHT KNEE: ICD-10-CM

## 2023-11-16 PROCEDURE — 97110 THERAPEUTIC EXERCISES: CPT | Performed by: PHYSICAL THERAPIST

## 2023-11-16 PROCEDURE — 97161 PT EVAL LOW COMPLEX 20 MIN: CPT | Performed by: PHYSICAL THERAPIST

## 2023-11-16 NOTE — LETTER
2023    Ventura Patricio MD  [facility] [id number]    Patient: Sergei Nazario   YOB: 1955   Date of Visit: 2023     Encounter Diagnosis     ICD-10-CM    1. Primary osteoarthritis of right knee  M17.11       2. Acute pain of right knee  M25.561           Dear Dr. Smith Samples: Thank you for your recent referral of Sergei Nazario. Please review the attached evaluation summary from Paulina's recent visit. Please verify that you agree with the plan of care by signing the attached order. If you have any questions or concerns, please do not hesitate to call. I sincerely appreciate the opportunity to share in the care of one of your patients and hope to have another opportunity to work with you in the near future. Sincerely,    Brad Sharp, PT      Referring Provider:      I certify that I have read the below Plan of Care and certify the need for these services furnished under this plan of treatment while under my care. Ventura Patricio MD  [facility] [address]  Via Fax: [phone]          PT Evaluation     Today's date: 2023  Patient name: Sergei Nazario  : 1955  MRN: [id number]  Referring provider: Ventura Patricio MD  Dx:   Encounter Diagnosis     ICD-10-CM    1. Primary osteoarthritis of right knee  M17.11       2. Acute pain of right knee  M25.561           Start Time: 1615  Stop Time: 1705  Total time in clinic (min): 50 minutes    Assessment  Assessment details: Sergei Nazario is a 75 y.o. female who presents with pain, decreased strength, and decreased ROM. Due to these impairments, Patient has difficulty performing a/iadls and recreational activities. Patient's clinical presentation is consistent with their referring diagnosis of right knee pain. Patient seen for pre-op TKR. Instructed in HEP and given copy with blue TB. She has good knowledge of HEP.   Patient instructed in stair and car transfers. Patient is to have home care for a few weeks and will begin OPT at week 4. Patient would benefit from skilled physical therapy to address their aforementioned impairments, improve their level of function and to improve their overall quality of life. Impairments: abnormal or restricted ROM, activity intolerance, impaired physical strength, lacks appropriate home exercise program, pain with function, poor posture  and poor body mechanics    Symptom irritability: moderateUnderstanding of Dx/Px/POC: good   Prognosis: good    Goals  Pre-op ST-3 WEEKS  1. Compliant with HEP  2. Decrease pain to manageable to perform ADL's and IADL's. ST-3 WEEKS post-op  1. Decrease pain right knee < 2/10 on VAS at its worst.  2.  Increase ROM right knee flexion > 128, extension < -5.   3.  Increase right knee strength > 4-/5.  4. Improve ambulation with least AD with safe technique on level surfaces. LT-6 WEEKS  1. Patient to be independent with a/iadls. 2. Increase functional activities for leisure and home activities to previous LOF. 3. Independent with HEP and/or fitness program.  4. Able to perform reciprocal stairs. Plan  Plan details:     Patient would benefit from: skilled physical therapy  Planned modality interventions: cryotherapy, thermotherapy: hydrocollator packs and unattended electrical stimulation  Planned therapy interventions: activity modification, behavior modification, body mechanics training, flexibility, functional ROM exercises, home exercise program, IADL retraining, joint mobilization, manual therapy, neuromuscular re-education, patient education, postural training, strengthening, stretching, therapeutic activities, therapeutic exercise, balance/weight bearing training and gait training  Frequency: 2-3x week.   Duration in weeks: 6  Plan of Care beginning date: 2023  Plan of Care expiration date: 2023  Treatment plan discussed with: patient        Subjective Evaluation    History of Present Illness  Mechanism of injury: Patient here for pre-op TKR. Scheduled for surgery 23 and having home care for 4 weeks. Will resume OPT week 4. Recurrent probem    Patient Goals  Patient goals for therapy: decreased pain, increased motion, improved balance, increased strength and return to sport/leisure activities  Patient goal: prepare for surgery  Pain  Current pain ratin  At worst pain ratin  Location: R knee pain  Quality: tight  Aggravating factors: walking, stair climbing and standing  Progression: worsening    Social Support  Steps to enter house: yes  2  Stairs in house: yes   Lives in: multiple-level home  Lives with: spouse    Employment status: not working    Diagnostic Tests  X-ray: abnormal  Treatments  Previous treatment: injection treatment and physical therapy        Objective     Static Posture     Head  Forward. Shoulders  Rounded. Thoracic Spine  Hyperkyphosis. Comments  Protruding abdomen moderate    Observations     Additional Observation Details  DJD noted right knee    Tenderness     Right Knee   Tenderness in the inferior patella and popliteal fossa. Neurological Testing     Sensation     Knee   Left Knee   Intact: Light touch    Right Knee   Intact: light touch     Reflexes   Left   Patellar (L4): trace (1+)    Right   Patellar (L4): trace (1+)    Active Range of Motion   Left Knee   Flexion: 123 degrees   Extension: 0 degrees     Right Knee   Flexion: 112 degrees with pain  Extension: -5 degrees with pain    Mobility   Patellar Mobility:   Left Knee   Hypomobile: left medial, left lateral, left superior and left inferior    Right Knee   WFL: medial and lateral  Hypomobile: superior and inferior     Patellar Mobility Comments   Right superior tendon comments: pain.      Strength/Myotome Testing     Left Knee   Flexion: 4+  Extension: 4+    Right Knee   Flexion: 4  Extension: 4  Quadriceps contraction: good    Tests     Right Knee   Negative anterior drawer. Swelling     Left Knee Girth Measurement (cm)   Joint line: 44.5 cm  10 cm above joint line: 52 (suprapatella) cm    Right Knee Girth Measurement (cm)   Joint line: 48.4 cm  10 cm above joint line: 54.4 (suprapatella) cm    Ambulation   Weight-Bearing Status   Assistive device used: none    Ambulation: Level Surfaces   Ambulation without assistive device: independent    Ambulation: Stairs   Pattern: non-reciprocal  Railings: two rails  Pattern: non-reciprocal  Railings: two rails    Observational Gait   Gait: antalgic   Decreased walking speed, stride length and right stance time.      Functional Assessment        Single Leg Stance   Left: 3 seconds  Right: 1 seconds           POC expires: 12/31/23  Date 11/16            Visit count 1            FOTO                  Precautions: fusion L4-S1 2/2022, HTN, IBS, LBP, B RTC repair    Daily Treatment diary:    Manuals 11/16                                                                Neuro Re-Ed                                                                                                        Ther Ex             HEP 25'                                                                                                       Ther Activity                                       Gait Training                                       Modalities

## 2023-11-16 NOTE — PROGRESS NOTES
PT Evaluation     Today's date: 2023  Patient name: Maira Duran  : 1955  MRN: [id number]  Referring provider: Diana Singh MD  Dx:   Encounter Diagnosis     ICD-10-CM    1. Primary osteoarthritis of right knee  M17.11       2. Acute pain of right knee  M25.561           Start Time: 1615  Stop Time: 1705  Total time in clinic (min): 50 minutes    Assessment  Assessment details: Maira Duran is a 75 y.o. female who presents with pain, decreased strength, and decreased ROM. Due to these impairments, Patient has difficulty performing a/iadls and recreational activities. Patient's clinical presentation is consistent with their referring diagnosis of right knee pain. Patient seen for pre-op TKR. Instructed in HEP and given copy with blue TB. She has good knowledge of HEP. Patient instructed in stair and car transfers. Patient is to have home care for a few weeks and will begin OPT at week 4. Patient would benefit from skilled physical therapy to address their aforementioned impairments, improve their level of function and to improve their overall quality of life. Impairments: abnormal or restricted ROM, activity intolerance, impaired physical strength, lacks appropriate home exercise program, pain with function, poor posture  and poor body mechanics    Symptom irritability: moderateUnderstanding of Dx/Px/POC: good   Prognosis: good    Goals  Pre-op ST-3 WEEKS  1. Compliant with HEP  2. Decrease pain to manageable to perform ADL's and IADL's. ST-3 WEEKS post-op  1. Decrease pain right knee < 2/10 on VAS at its worst.  2.  Increase ROM right knee flexion > 128, extension < -5.   3.  Increase right knee strength > 4-/5.  4. Improve ambulation with least AD with safe technique on level surfaces. LT-6 WEEKS  1. Patient to be independent with a/iadls. 2. Increase functional activities for leisure and home activities to previous LOF.   3. Independent with HEP and/or fitness program.  4. Able to perform reciprocal stairs. Plan  Plan details:     Patient would benefit from: skilled physical therapy  Planned modality interventions: cryotherapy, thermotherapy: hydrocollator packs and unattended electrical stimulation  Planned therapy interventions: activity modification, behavior modification, body mechanics training, flexibility, functional ROM exercises, home exercise program, IADL retraining, joint mobilization, manual therapy, neuromuscular re-education, patient education, postural training, strengthening, stretching, therapeutic activities, therapeutic exercise, balance/weight bearing training and gait training  Frequency: 2-3x week. Duration in weeks: 6  Plan of Care beginning date: 2023  Plan of Care expiration date: 2023  Treatment plan discussed with: patient        Subjective Evaluation    History of Present Illness  Mechanism of injury: Patient here for pre-op TKR. Scheduled for surgery 23 and having home care for 4 weeks. Will resume OPT week 4. Recurrent probem    Patient Goals  Patient goals for therapy: decreased pain, increased motion, improved balance, increased strength and return to sport/leisure activities  Patient goal: prepare for surgery  Pain  Current pain ratin  At worst pain ratin  Location: R knee pain  Quality: tight  Aggravating factors: walking, stair climbing and standing  Progression: worsening    Social Support  Steps to enter house: yes  2  Stairs in house: yes   Lives in: multiple-level home  Lives with: spouse    Employment status: not working    Diagnostic Tests  X-ray: abnormal  Treatments  Previous treatment: injection treatment and physical therapy        Objective     Static Posture     Head  Forward. Shoulders  Rounded. Thoracic Spine  Hyperkyphosis.     Comments  Protruding abdomen moderate    Observations     Additional Observation Details  DJD noted right knee    Tenderness     Right Knee   Tenderness in the inferior patella and popliteal fossa. Neurological Testing     Sensation     Knee   Left Knee   Intact: Light touch    Right Knee   Intact: light touch     Reflexes   Left   Patellar (L4): trace (1+)    Right   Patellar (L4): trace (1+)    Active Range of Motion   Left Knee   Flexion: 123 degrees   Extension: 0 degrees     Right Knee   Flexion: 112 degrees with pain  Extension: -5 degrees with pain    Mobility   Patellar Mobility:   Left Knee   Hypomobile: left medial, left lateral, left superior and left inferior    Right Knee   WFL: medial and lateral  Hypomobile: superior and inferior     Patellar Mobility Comments   Right superior tendon comments: pain. Strength/Myotome Testing     Left Knee   Flexion: 4+  Extension: 4+    Right Knee   Flexion: 4  Extension: 4  Quadriceps contraction: good    Tests     Right Knee   Negative anterior drawer. Swelling     Left Knee Girth Measurement (cm)   Joint line: 44.5 cm  10 cm above joint line: 52 (suprapatella) cm    Right Knee Girth Measurement (cm)   Joint line: 48.4 cm  10 cm above joint line: 54.4 (suprapatella) cm    Ambulation   Weight-Bearing Status   Assistive device used: none    Ambulation: Level Surfaces   Ambulation without assistive device: independent    Ambulation: Stairs   Pattern: non-reciprocal  Railings: two rails  Pattern: non-reciprocal  Railings: two rails    Observational Gait   Gait: antalgic   Decreased walking speed, stride length and right stance time.      Functional Assessment        Single Leg Stance   Left: 3 seconds  Right: 1 seconds           POC expires: 12/31/23  Date 11/16            Visit count 1            FOTO                  Precautions: fusion L4-S1 2/2022, HTN, IBS, LBP, B RTC repair    Daily Treatment diary:    Manuals 11/16                                                                Neuro Re-Ed                                                                                                        Ther Ex HEP 25'                                                                                                       Ther Activity                                       Gait Training                                       Modalities

## 2023-12-18 ENCOUNTER — OFFICE VISIT (OUTPATIENT)
Dept: PHYSICAL THERAPY | Age: 68
End: 2023-12-18
Payer: MEDICARE

## 2023-12-18 DIAGNOSIS — M17.11 PRIMARY OSTEOARTHRITIS OF RIGHT KNEE: Primary | ICD-10-CM

## 2023-12-18 DIAGNOSIS — M25.561 ACUTE PAIN OF RIGHT KNEE: ICD-10-CM

## 2023-12-18 DIAGNOSIS — Z96.651 STATUS POST TOTAL RIGHT KNEE REPLACEMENT: ICD-10-CM

## 2023-12-18 PROCEDURE — 97110 THERAPEUTIC EXERCISES: CPT | Performed by: PHYSICAL THERAPIST

## 2023-12-18 PROCEDURE — 97164 PT RE-EVAL EST PLAN CARE: CPT | Performed by: PHYSICAL THERAPIST

## 2023-12-18 NOTE — PROGRESS NOTES
PT Re-Evaluation     Today's date: 2023  Patient name: Paulina Sidhu  : 1955  MRN: [id number]  Referring provider: Quincy Lopez MD  Dx:   Encounter Diagnosis     ICD-10-CM    1. Primary osteoarthritis of right knee  M17.11       2. Acute pain of right knee  M25.561       3. Status post total right knee replacement  Z96.651           Start Time: 1302  Stop Time: 1355  Total time in clinic (min): 53 minutes    Assessment  Assessment details: Paulina Sidhu is a 67 y.o. female who presents with pain, decreased strength, and decreased ROM. Due to these impairments, Patient has difficulty performing a/iadls and recreational activities. Patient's clinical presentation is consistent with their referring diagnosis of right knee pain. Patient seen for post-op TKR. She is doing well. Patient would benefit from skilled physical therapy to address their aforementioned impairments, improve their level of function and to improve their overall quality of life.  Impairments: abnormal or restricted ROM, activity intolerance, impaired physical strength, lacks appropriate home exercise program, pain with function, poor posture  and poor body mechanics    Symptom irritability: moderateUnderstanding of Dx/Px/POC: good   Prognosis: good    Goals  ST-3 WEEKS post-op  1.  Decrease pain right knee < 2/10 on VAS at its worst.  2.  Increase ROM right knee flexion > 128.   3.  Increase right knee strength > 4+/5.  4. Improve ambulation with least AD with safe technique on level surfaces.    LT-6 WEEKS  1. Patient to be independent with a/iadls.  2. Increase functional activities for leisure and home activities to previous LOF.  3. Independent with HEP and/or fitness program.  4. Able to perform reciprocal stairs.     Plan  Plan details:     Patient would benefit from: skilled physical therapy  Planned modality interventions: cryotherapy, thermotherapy: hydrocollator packs and unattended electrical  stimulation  Planned therapy interventions: activity modification, behavior modification, body mechanics training, flexibility, functional ROM exercises, home exercise program, IADL retraining, joint mobilization, manual therapy, neuromuscular re-education, patient education, postural training, strengthening, stretching, therapeutic activities, therapeutic exercise, balance/weight bearing training and gait training  Frequency: 2-3x week.  Duration in weeks: 8  Plan of Care beginning date: 2023  Plan of Care expiration date: 2024  Treatment plan discussed with: patient      Subjective Evaluation    History of Present Illness  Mechanism of injury: Patient here for pre-op TKR. Scheduled for surgery 23 and having home care for 4 weeks. Will resume OPT week 4.  23 Patient completed home care and did well. She is able to begin OPT. Staples were removed by home care. She has follow up tomorrow with MD. C/o pain in right knee. Stairs are still difficult. She does c/o left knee bucking at times. She states she is suppose to have the left knee replaced at some time also. She also c/o clicking in right knee also.           Recurrent probem    Patient Goals  Patient goals for therapy: decreased pain, increased motion, increased strength, return to sport/leisure activities and decreased edema  Patient goal: able to walk with no AD, able to do stairs better.  Pain  Current pain ratin  At worst pain ratin  Location: R knee pain  Quality: tight  Aggravating factors: walking, stair climbing and standing  Progression: improved    Social Support  Steps to enter house: yes  2  Stairs in house: yes   Lives in: multiple-level home  Lives with: spouse    Employment status: not working    Diagnostic Tests  X-ray: abnormal  Treatments  Previous treatment: injection treatment and physical therapy      Objective     Static Posture     Head  Forward.    Shoulders  Rounded.    Thoracic  Spine  Hyperkyphosis.    Comments  Protruding abdomen moderate    Neurological Testing     Sensation     Knee   Left Knee   Intact: Light touch    Right Knee   Intact: light touch     Active Range of Motion   Left Knee   Flexion: 130 degrees   Extension: 0 degrees     Right Knee   Flexion: 113 degrees with pain  Extension: 0 degrees     Mobility   Patellar Mobility:   Left Knee   Hypomobile: left medial, left lateral, left superior and left inferior    Right Knee   WFL: medial, lateral, superior and inferior    Strength/Myotome Testing     Left Knee   Flexion: 4+  Extension: 4+    Right Knee   Flexion: 4  Extension: 4  Quadriceps contraction: good    Swelling     Left Knee Girth Measurement (cm)   Joint line: 44.5 cm  10 cm above joint line: 52 (suprapatella) cm    Right Knee Girth Measurement (cm)   Joint line: 44.8 cm  10 cm above joint line: 55 (suprapatella) cm    Ambulation   Weight-Bearing Status   Weight-Bearing Status (Left): weight-bearing as tolerated   Assistive device used: single point cane    Ambulation: Level Surfaces   Ambulation with assistive device: independent  Ambulation without assistive device: independent    Ambulation: Stairs   Pattern: non-reciprocal  Railings: two rails  Pattern: non-reciprocal  Railings: two rails    Observational Gait   Gait: antalgic   Decreased walking speed, stride length and right stance time.     Functional Assessment        Single Leg Stance   Left: 3 seconds  Right: 3 seconds    Comments  TU seconds no AD.       Flowsheet Rows      Flowsheet Row Most Recent Value   PT/OT G-Codes    Current Score 49   Projected Score 64                  POC expires: 23  Date            Visit count 1 2           Re-eval  SG           FOTO  49/64                Precautions: fusion L4-S1 2022, HTN, IBS, LBP, B RTC repair    Daily Treatment diary:    Manuals            R knee/ hamstring  8                                                  Neuro Re-Ed                                                                                                         Ther Ex             HEP 25'            Hip adduction  30x ball           Hip abduction  30x BTB           SLR R  3x10           R SAQ  2# 30x           R LAQ  2# 30x           R heel slides  30x           Slant board             Nustep for strength  5' L4                                                  Ther Activity                                       Gait Training                                       Modalities             Ice after R knee  8'

## 2023-12-21 ENCOUNTER — OFFICE VISIT (OUTPATIENT)
Dept: PHYSICAL THERAPY | Age: 68
End: 2023-12-21
Payer: MEDICARE

## 2023-12-21 DIAGNOSIS — M17.11 PRIMARY OSTEOARTHRITIS OF RIGHT KNEE: Primary | ICD-10-CM

## 2023-12-21 DIAGNOSIS — M25.561 ACUTE PAIN OF RIGHT KNEE: ICD-10-CM

## 2023-12-21 DIAGNOSIS — Z96.651 STATUS POST TOTAL RIGHT KNEE REPLACEMENT: ICD-10-CM

## 2023-12-21 PROCEDURE — 97140 MANUAL THERAPY 1/> REGIONS: CPT | Performed by: PHYSICAL THERAPIST

## 2023-12-21 PROCEDURE — 97110 THERAPEUTIC EXERCISES: CPT | Performed by: PHYSICAL THERAPIST

## 2023-12-21 NOTE — PROGRESS NOTES
"Daily Note     Today's date: 2023  Patient name: Paulina Sidhu  : 1955  MRN: [id number]  Referring provider: Quincy Lopez MD  Dx:   Encounter Diagnosis     ICD-10-CM    1. Primary osteoarthritis of right knee  M17.11       2. Acute pain of right knee  M25.561       3. Status post total right knee replacement  Z96.651           Start Time: 1030  Stop Time: 1115  Total time in clinic (min): 45 minutes    Subjective: Patient reports she saw MD and xrays are good. She stated MD is very happy with her progress. She is able to drive now.       Objective: See treatment diary below      Assessment: Tolerated treatment well. Patient would benefit from continued PT. Ambulates with SPC but full weight bearing. Good ROM right knee flexion and extension. Decreasing swelling in distal RLE and knee.       Plan: Continue per plan of care.  Progress treament per protocol.      POC expires: 23  Date           Visit count 1 2 3          Re-eval  SG           FOTO  49/64                Precautions: fusion L4-S1 2022, HTN, IBS, LBP, B RTC repair    Daily Treatment diary:    Manuals           R knee/ hamstring  8 8                                                 Neuro Re-Ed                                                                                                        Ther Ex             HEP 25'            Hip adduction  30x ball 30x          Hip abduction  30x BTB 30x          SLR R  3x10 3x10          R SAQ  2# 30x 2# 30x          R LAQ  2# 30x 2# 30x          R heel slides  30x 30x          Slant board   L2 30\" 4x          Nustep for strength  5' L4 5' L4                                                 Ther Activity                                       Gait Training                                       Modalities             Ice after R knee  8' 10'                               "

## 2023-12-27 ENCOUNTER — APPOINTMENT (OUTPATIENT)
Dept: PHYSICAL THERAPY | Age: 68
End: 2023-12-27
Payer: MEDICARE

## 2023-12-29 ENCOUNTER — OFFICE VISIT (OUTPATIENT)
Dept: PHYSICAL THERAPY | Age: 68
End: 2023-12-29
Payer: MEDICARE

## 2023-12-29 DIAGNOSIS — Z96.651 STATUS POST TOTAL RIGHT KNEE REPLACEMENT: ICD-10-CM

## 2023-12-29 DIAGNOSIS — M25.561 ACUTE PAIN OF RIGHT KNEE: ICD-10-CM

## 2023-12-29 DIAGNOSIS — M17.11 PRIMARY OSTEOARTHRITIS OF RIGHT KNEE: Primary | ICD-10-CM

## 2023-12-29 PROCEDURE — 97140 MANUAL THERAPY 1/> REGIONS: CPT

## 2023-12-29 PROCEDURE — 97110 THERAPEUTIC EXERCISES: CPT

## 2023-12-29 NOTE — PROGRESS NOTES
"Daily Note     Today's date: 2023  Patient name: Paulina Sidhu  : 1955  MRN: [id number]  Referring provider: Quincy Lopez MD  Dx:   Encounter Diagnosis     ICD-10-CM    1. Primary osteoarthritis of right knee  M17.11       2. Acute pain of right knee  M25.561       3. Status post total right knee replacement  Z96.651           Start Time: 1112  Stop Time: 1153  Total time in clinic (min): 41 minutes    Subjective: Reports B knee pain.       Objective: See treatment diary below      Assessment: Difficulty with mini squats due to L knee pain, cues for proper weight shift and to hinge at hips. Cue to slow down and control movement during LAQ and SAQ. Will progress as able. ROM progressing well at R knee. Patient would benefit from continued PT      Plan: Continue per plan of care.      POC expires: 23  Date          Visit count 1 2 3 4         Re-eval  SG           FOTO  49/64                Precautions: fusion L4-S1 2022, HTN, IBS, LBP, B RTC repair    Daily Treatment diary:    Manuals          R knee/ hamstring  8 8 8'                                                Neuro Re-Ed                                                                                                        Ther Ex             HEP 25'            Hip adduction  30x ball 30x 30x          Hip abduction  30x BTB 30x 30x BTB          SLR R  3x10 3x10 3x10         R SAQ  2# 30x 2# 30x 2# 30x          R LAQ  2# 30x 2# 30x 2# 30x          R heel slides  30x 30x 30x          Slant board   L2 30\" 4x L2 30\"x4          Nustep for strength  5' L4 5' L4 L4 6'                       Standing hip abd    20x          Mini squats    15x                                                 Ther Activity                                       Gait Training                                       Modalities             Ice after R knee  8' 10' Declined                                 "

## 2024-01-02 ENCOUNTER — OFFICE VISIT (OUTPATIENT)
Dept: PHYSICAL THERAPY | Age: 69
End: 2024-01-02
Payer: MEDICARE

## 2024-01-02 DIAGNOSIS — M25.561 ACUTE PAIN OF RIGHT KNEE: ICD-10-CM

## 2024-01-02 DIAGNOSIS — Z96.651 STATUS POST TOTAL RIGHT KNEE REPLACEMENT: ICD-10-CM

## 2024-01-02 DIAGNOSIS — M17.11 PRIMARY OSTEOARTHRITIS OF RIGHT KNEE: Primary | ICD-10-CM

## 2024-01-02 PROCEDURE — 97140 MANUAL THERAPY 1/> REGIONS: CPT | Performed by: PHYSICAL THERAPIST

## 2024-01-02 PROCEDURE — 97110 THERAPEUTIC EXERCISES: CPT | Performed by: PHYSICAL THERAPIST

## 2024-01-02 NOTE — PROGRESS NOTES
"Daily Note     Today's date: 2024  Patient name: Paulina Sidhu  : 1955  MRN: [id number]  Referring provider: Quincy Lopez MD  Dx:   Encounter Diagnosis     ICD-10-CM    1. Primary osteoarthritis of right knee  M17.11       2. Acute pain of right knee  M25.561       3. Status post total right knee replacement  Z96.651                      Subjective: Pt reports that the knee is doing well but it feels like a heavy weight today.       Objective: See treatment diary below      Assessment: Pt arrived to session late, pt was accommodated. Tolerated treatment well. Patient demonstrated fatigue post treatment, exhibited good technique with therapeutic exercises, and would benefit from continued PT. Pt continued to be challenged with quad strengthening, unable to maintain TKE during SLR. Continue to progress as able.       Plan: Continue per plan of care.  Progress treatment as tolerated.       POC expires: 23  Date         Visit count 1 2 3 4         Re-eval  SG           FOTO  49/64                Precautions: fusion L4-S1 2022, HTN, IBS, LBP, B RTC repair    Daily Treatment diary:    Manuals  12        R knee/ hamstring  8 8 8' 8'                                               Neuro Re-Ed                                                                                                        Ther Ex             HEP 25'            Hip adduction  30x ball 30x 30x  30x        Hip abduction  30x BTB 30x 30x BTB  30x BTB         SLR R  3x10 3x10 3x10 3x10        R SAQ  2# 30x 2# 30x 2# 30x  2# 30x         R LAQ  2# 30x 2# 30x 2# 30x  2# 30x         R heel slides  30x 30x 30x  30x        Slant board   L2 30\" 4x L2 30\"x4  L2 30\"x4         Nustep for strength  5' L4 5' L4 L4 6'  L4 6'                      Standing hip abd    20x  20x ea        Mini squats    15x  15x                                                Ther Activity                               "         Gait Training                                       Modalities             Ice after R knee  8' 10' Declined

## 2024-01-05 ENCOUNTER — OFFICE VISIT (OUTPATIENT)
Dept: PHYSICAL THERAPY | Age: 69
End: 2024-01-05
Payer: MEDICARE

## 2024-01-05 DIAGNOSIS — M25.561 ACUTE PAIN OF RIGHT KNEE: ICD-10-CM

## 2024-01-05 DIAGNOSIS — M17.11 PRIMARY OSTEOARTHRITIS OF RIGHT KNEE: Primary | ICD-10-CM

## 2024-01-05 DIAGNOSIS — Z96.651 STATUS POST TOTAL RIGHT KNEE REPLACEMENT: ICD-10-CM

## 2024-01-05 PROCEDURE — 97140 MANUAL THERAPY 1/> REGIONS: CPT | Performed by: PHYSICAL THERAPIST

## 2024-01-05 PROCEDURE — 97110 THERAPEUTIC EXERCISES: CPT | Performed by: PHYSICAL THERAPIST

## 2024-01-05 NOTE — PROGRESS NOTES
"Daily Note     Today's date: 2024  Patient name: Paulina Sidhu  : 1955  MRN: [id number]  Referring provider: Quincy Lopez MD  Dx:   Encounter Diagnosis     ICD-10-CM    1. Primary osteoarthritis of right knee  M17.11       2. Acute pain of right knee  M25.561       3. Status post total right knee replacement  Z96.651           Start Time: 1040  Stop Time: 1135  Total time in clinic (min): 55 minutes    Subjective: Patient walking without AD at all now less.       Objective: See treatment diary below      Assessment: Tolerated treatment well. Patient would benefit from continued PT. Good ROM and making good gains in strength. No AD today. Incision healed well, mild scar tissue with mild tenderness. Good patella mob.       Plan: Continue per plan of care.      POC expires: 24  Date        Visit count 1 2 3 4 5 6       Re-eval  SG           FOTO  49/64           POC expires: 24  Date             Visit count             Re-eval             FOTO                  Precautions: fusion L4-S1 2022, HTN, IBS, LBP, B RTC repair    Daily Treatment diary:    Manuals        R knee/ hamstring  8 8 8' 8' 8'                                              Neuro Re-Ed                                                                                                        Ther Ex             HEP 25'            Hip adduction  30x ball 30x 30x  30x 30# 30x       Hip abduction  30x BTB 30x 30x BTB  30x BTB  30# 30x       SLR R  3x10 3x10 3x10 3x10 3x10       R SAQ  2# 30x 2# 30x 2# 30x  2# 30x  2# 30x       R LAQ  2# 30x 2# 30x 2# 30x  2# 30x  2# 30x       R heel slides  30x 30x 30x  30x 30x       Slant board   L2 30\" 4x L2 30\"x4  L2 30\"x4  L3 30\" 4x       Nustep for strength  5' L4 5' L4 L4 6'  L4 6'  L6 10'                    Standing hip abd    20x  20x ea 2# 30x       Mini squats    15x  15x  30x                                              Ther " "Activity             Step ups      6\" 30x       Lat step down      6\" 10x       Gait Training                                       Modalities             Ice after R knee  8' 10' Declined   10'                                  "

## 2024-01-08 ENCOUNTER — APPOINTMENT (OUTPATIENT)
Dept: PHYSICAL THERAPY | Age: 69
End: 2024-01-08
Payer: MEDICARE

## 2024-01-08 ENCOUNTER — OFFICE VISIT (OUTPATIENT)
Dept: PHYSICAL THERAPY | Age: 69
End: 2024-01-08
Payer: MEDICARE

## 2024-01-08 DIAGNOSIS — M17.11 PRIMARY OSTEOARTHRITIS OF RIGHT KNEE: Primary | ICD-10-CM

## 2024-01-08 DIAGNOSIS — Z96.651 STATUS POST TOTAL RIGHT KNEE REPLACEMENT: ICD-10-CM

## 2024-01-08 DIAGNOSIS — M25.561 ACUTE PAIN OF RIGHT KNEE: ICD-10-CM

## 2024-01-08 PROCEDURE — 97140 MANUAL THERAPY 1/> REGIONS: CPT | Performed by: PHYSICAL THERAPIST

## 2024-01-08 PROCEDURE — 97110 THERAPEUTIC EXERCISES: CPT | Performed by: PHYSICAL THERAPIST

## 2024-01-08 NOTE — PROGRESS NOTES
"Daily Note     Today's date: 2024  Patient name: Paulina Sidhu  : 1955  MRN: [id number]  Referring provider: Quincy Lopez MD  Dx:   Encounter Diagnosis     ICD-10-CM    1. Primary osteoarthritis of right knee  M17.11       2. Acute pain of right knee  M25.561       3. Status post total right knee replacement  Z96.651           Start Time: 1100  Stop Time: 1159  Total time in clinic (min): 59 minutes    Subjective: Patient reports she fell in snow yesterday and hurt her back and left hand/fingers. She had difficulty getting up from the ground.       Objective: See treatment diary below      Assessment: Tolerated treatment well. Patient would benefit from continued PT. Increased fluid in both LE's with pitting distal, but increased firmness in right anterior and posterior thighs. Girth was 47 inferior patella and  57 cm suprapatella. Advised ice and elevation. ROM to 110° flexion 0° extension.       Plan: Continue per plan of care.      POC expires: 24  Date       Visit count 1 2 3 4 5 6 7      Re-eval  SG           FOTO        POC expires: 24  Date             Visit count             Re-eval             FOTO                  Precautions: fusion L4-S1 2022, HTN, IBS, LBP, B RTC repair    Daily Treatment diary:    Manuals       R knee/ hamstring  8 8 8' 8' 8' 5'                                             Neuro Re-Ed                                                                                                        Ther Ex             HEP 25'            Hip adduction  30x ball 30x 30x  30x 30# 30x 30# 30x      Hip abduction  30x BTB 30x 30x BTB  30x BTB  30# 30x 30# 30x      SLR R  3x10 3x10 3x10 3x10 3x10 3x10      R SAQ  2# 30x 2# 30x 2# 30x  2# 30x  2# 30x 03 30x      R LAQ  2# 30x 2# 30x 2# 30x  2# 30x  2# 30x 0# 30x      R heel slides  30x 30x 30x  30x 30x 30x      Slant board   L2 30\" 4x L2 30\"x4 " " L2 30\"x4  L3 30\" 4x L2 30\" 4x      Nustep for strength  5' L4 5' L4 L4 6'  L4 6'  L6 10' L5 10'                   Standing hip abd    20x  20x ea 2# 30x 0#      Mini squats    15x  15x  30x 15x                                             Ther Activity             Step ups      6\" 30x 30x      Lat step down      6\" 10x nt      Gait Training                                       Modalities             Ice after R knee  8' 10' Declined   10' 10'                                   "

## 2024-01-11 ENCOUNTER — CONSULT (OUTPATIENT)
Dept: BARIATRICS | Facility: CLINIC | Age: 69
End: 2024-01-11
Payer: MEDICARE

## 2024-01-11 ENCOUNTER — APPOINTMENT (OUTPATIENT)
Dept: PHYSICAL THERAPY | Age: 69
End: 2024-01-11
Payer: MEDICARE

## 2024-01-11 ENCOUNTER — TELEPHONE (OUTPATIENT)
Dept: PHYSICAL THERAPY | Age: 69
End: 2024-01-11

## 2024-01-11 VITALS
RESPIRATION RATE: 18 BRPM | HEIGHT: 67 IN | OXYGEN SATURATION: 98 % | HEART RATE: 80 BPM | DIASTOLIC BLOOD PRESSURE: 84 MMHG | SYSTOLIC BLOOD PRESSURE: 140 MMHG | WEIGHT: 241.1 LBS | BODY MASS INDEX: 37.84 KG/M2

## 2024-01-11 DIAGNOSIS — E66.9 OBESITY, CLASS II, BMI 35-39.9: ICD-10-CM

## 2024-01-11 DIAGNOSIS — R63.5 WEIGHT GAIN FOLLOWING GASTRIC BYPASS SURGERY: ICD-10-CM

## 2024-01-11 DIAGNOSIS — K91.2 POSTSURGICAL MALABSORPTION: ICD-10-CM

## 2024-01-11 DIAGNOSIS — Z48.815 ENCOUNTER FOR SURGICAL AFTERCARE FOLLOWING SURGERY OF DIGESTIVE SYSTEM: Primary | ICD-10-CM

## 2024-01-11 DIAGNOSIS — K21.9 GERD (GASTROESOPHAGEAL REFLUX DISEASE): ICD-10-CM

## 2024-01-11 DIAGNOSIS — Z98.84 WEIGHT GAIN FOLLOWING GASTRIC BYPASS SURGERY: ICD-10-CM

## 2024-01-11 PROBLEM — I10 ESSENTIAL HYPERTENSION: Status: ACTIVE | Noted: 2017-01-20

## 2024-01-11 PROCEDURE — 99205 OFFICE O/P NEW HI 60 MIN: CPT | Performed by: PHYSICIAN ASSISTANT

## 2024-01-11 RX ORDER — AMOXICILLIN AND CLAVULANATE POTASSIUM 875; 125 MG/1; MG/1
TABLET, FILM COATED ORAL
COMMUNITY
Start: 2023-12-30

## 2024-01-11 NOTE — ASSESSMENT & PLAN NOTE
-On Nesium 40mg BID and Pepcid 40mg   -Advised avoidance of food triggers and gastric irritants, follow anti-reflux diet and lifestyle measures  -Obtain repeat UGI  -Weight loss and diet changes should help with reflux symptoms

## 2024-01-11 NOTE — ASSESSMENT & PLAN NOTE
-At risk for malabsorption of vitamins/minerals secondary to malabsorption and restriction of intake from bariatric surgery  -NOT Currently taking adequate postop bariatric surgery vitamin supplementation: 1,000mcg B12, Vitamin C, Vitamin D 5,000IU, folic acid, potassium - needs to start bariatric MVI and calcium citrate 500mg TID    -Obtain CBC/Metabolic panel  -Patient received education about the importance of adhering to a lifelong supplementation regimen to avoid vitamin/mineral deficiencies

## 2024-01-11 NOTE — ASSESSMENT & PLAN NOTE
-s/p open Nakita-En-Y Gastric Bypass - DS (multiple surgeries) with Dr. Cruz in [place] in 2000.  Struggling with weight regain. Obtain repeat UGI to r/o GGF. Work on diet and lifestyle changes, keep logs/track, consult with LENORA and KERMIT.    Initial: 315lbs  Current: 241.1lbs  [place]: 170lbs  Current BMI is Body mass index is 37.76 kg/m².    Tolerating a regular diet-yes  Eating at least 60 grams of protein per day-no  Following 30/60 minute rule with liquids-no  Drinking at least 64 ounces of fluid per day-no  Drinking carbonated beverages-no  Sufficient exercise-no d/t back and knee pain  Using NSAIDs regularly-no  Using nicotine-no  Using alcohol-no. Advised about the risks of alcohol s/p bariatric surgery and recommend avoiding all alcohol

## 2024-01-11 NOTE — PATIENT INSTRUCTIONS
GOALS:   Continued/Maintain healthy weight loss with good nutrition intakes.  Adequate hydration with at least 64oz. fluid intake.  Normal vitamin and mineral levels.  Exercise as tolerated.  Follow 30/60 minute rule  Avoid juice or any sugary drinks, no NSAIDS    Follow-up in 1 year. We kindly ask that your arrive 15 minutes before your scheduled appointment time with your provider to allow our staff to room you, get your vital signs and update your chart.    Follow diet as discussed.      Get lab work done in the next 2 weeks.  You have been given a lab slip today.  Please call the office if you need a replacement.  It is recommended to check with your insurance BEFORE getting labs done to make sure they are covered by your policy.  Also, please check with your PCP and other providers before getting labs to avoid duplicate labs. Make sure to HOLD any multivitamins that may contain biotin and any biotin supplements FOR 5 DAYS before any labs since it can affect the results.    Follow vitamin and mineral recommendations as reviewed with you.    Call our office if you have any problems with abdominal pain especially associated with fever, chills, nausea, vomiting or any other concerns.    All  Post-bariatric surgery patients should be aware that very small quantities of any alcohol can cause impairment and it is very possible not to feel the effect. The effect can be in the system for several hours.  It is also a stomach irritant.     It is advised to AVOID alcohol, Nonsteroidal antiinflammatory drugs (NSAIDS) and nicotine of all forms . Any of these can cause stomach irritation/pain.

## 2024-01-11 NOTE — TELEPHONE ENCOUNTER
Paulina called the office to let us know that she had just fallen down the stairs at her house as she was leaving to come to her appt. She stated that she was currently up and was hurting. She also stated that she had fallen to her right side as best she could to protect the new surgery side. I recommended that she call  the dr and inform them of what had happened in case they wanted to see her. I also told her to call us if she was feeling alright she should give us a call and we would get her in later today or tomorrow.

## 2024-01-15 ENCOUNTER — OFFICE VISIT (OUTPATIENT)
Dept: PHYSICAL THERAPY | Age: 69
End: 2024-01-15
Payer: MEDICARE

## 2024-01-15 DIAGNOSIS — Z96.651 STATUS POST TOTAL RIGHT KNEE REPLACEMENT: ICD-10-CM

## 2024-01-15 DIAGNOSIS — M17.11 PRIMARY OSTEOARTHRITIS OF RIGHT KNEE: Primary | ICD-10-CM

## 2024-01-15 DIAGNOSIS — M25.561 ACUTE PAIN OF RIGHT KNEE: ICD-10-CM

## 2024-01-15 PROCEDURE — 97110 THERAPEUTIC EXERCISES: CPT

## 2024-01-15 PROCEDURE — 97140 MANUAL THERAPY 1/> REGIONS: CPT

## 2024-01-15 NOTE — PROGRESS NOTES
"Daily Note     Today's date: 1/15/2024  Patient name: Paulina Sidhu  : 1955  MRN: [id number]  Referring provider: Quincy Lopez MD  Dx:   Encounter Diagnosis     ICD-10-CM    1. Primary osteoarthritis of right knee  M17.11       2. Acute pain of right knee  M25.561       3. Status post total right knee replacement  Z96.651           Start Time: 1000  Stop Time: 1055  Total time in clinic (min): 55 minutes    Subjective: Reports being sore from her fall on , patient has been in contact with her MD. States it is mostly her L knee that she went down on trying to avoid her R knee.       Objective: See treatment diary below  Patient was seen 1:1 for 30 min.     Assessment: Modified session today secondary to patient returning to PT after falling on her deck steps. Will resume program when tolerated. Patient is challenged by SLR on RLE, removed weight this date and she was able to complete all reps. Patient would benefit from continued PT.      Plan: Continue per plan of care.      POC expires: 24  Date 11/16 12/18 12/21 12/29 1/2 1/5 1/8 1/15     Visit count 1 2 3 4 5 6 7 8     Re-eval  SG           FOTO               Precautions: fusion L4-S1 2022, HTN, IBS, LBP, B RTC repair    Daily Treatment diary:    Manuals 11/16 12/18 12/21 12/29 1/2 1/5 1/8 1/15     R knee/ hamstring  8 8 8' 8' 8' 5' 5'                                             Neuro Re-Ed                                                                                                        Ther Ex             HEP 25'            Hip adduction  30x ball 30x 30x  30x 30# 30x 30# 30x Ball 30x      Hip abduction  30x BTB 30x 30x BTB  30x BTB  30# 30x 30# 30x BTB 30x      SLR R  3x10 3x10 3x10 3x10 3x10 3x10 3x10     R SAQ  2# 30x 2# 30x 2# 30x  2# 30x  2# 30x 3# 30x 3# 10x, 0# 2x10     R LAQ  2# 30x 2# 30x 2# 30x  2# 30x  2# 30x 0# 30x 0# 30x      R heel slides  30x 30x 30x  30x 30x 30x 30x     Slant board   L2 30\" 4x L2 " "30\"x4  L2 30\"x4  L3 30\" 4x L2 30\" 4x L3 30\"x4     Nustep for strength  5' L4 5' L4 L4 6'  L4 6'  L6 10' L5 10' L5 10'                   Standing hip abd    20x  20x ea 2# 30x 0# 0# 3x10     Mini squats    15x  15x  30x 15x 15x                                             Ther Activity             Step ups      6\" 30x 30x NV     Lat step down      6\" 10x nt NV      Gait Training                                       Modalities             Ice after R knee  8' 10' Declined   10' 10' 10'                                     "

## 2024-01-18 ENCOUNTER — OFFICE VISIT (OUTPATIENT)
Dept: PHYSICAL THERAPY | Age: 69
End: 2024-01-18
Payer: MEDICARE

## 2024-01-18 DIAGNOSIS — M25.561 ACUTE PAIN OF RIGHT KNEE: ICD-10-CM

## 2024-01-18 DIAGNOSIS — M17.11 PRIMARY OSTEOARTHRITIS OF RIGHT KNEE: Primary | ICD-10-CM

## 2024-01-18 DIAGNOSIS — Z96.651 STATUS POST TOTAL RIGHT KNEE REPLACEMENT: ICD-10-CM

## 2024-01-18 PROCEDURE — 97110 THERAPEUTIC EXERCISES: CPT | Performed by: PHYSICAL THERAPIST

## 2024-01-18 NOTE — PROGRESS NOTES
"Daily Note     Today's date: 2024  Patient name: Paulina Sidhu  : 1955  MRN: [id number]  Referring provider: Quincy Lopez MD  Dx:   Encounter Diagnosis     ICD-10-CM    1. Primary osteoarthritis of right knee  M17.11       2. Acute pain of right knee  M25.561       3. Status post total right knee replacement  Z96.651           Start Time: 949  Stop Time: 104  Total time in clinic (min): 53 minutes    Subjective: Patient reports her knee is sore inferior patella and hurts more going on stairs. She is going one step at a time again. She also c/o LBP. She is seeing MD for back tomorrow.       Objective: See treatment diary below      Assessment: Tolerated treatment well. Patient would benefit from continued PT. Swelling is better in right LE and knee. Good ROM. Modified TE's due to c/o pain.       Plan: Continue per plan of care.      POC expires: 24  Date 11/16 12/18 12/21 12/29 1/2 1/5 1/8 1/15 1/18    Visit count 1 2 3 4 5 6 7 8 9    Re-eval  SG           FOTO           POC expires: 24  Date             Visit count             Re-eval             FOTO                    Precautions: fusion L4-S1 2022, HTN, IBS, LBP, B RTC repair    Daily Treatment diary:    Manuals 11/16 12/18 12/21 12/29 1/2 1/5 1/8 1/15 1/18    R knee/ hamstring  8 8 8' 8' 8' 5' 5'  5                                           Neuro Re-Ed                                                                                                        Ther Ex             HEP 25'            Hip adduction  30x ball 30x 30x  30x 30# 30x 30# 30x Ball 30x  30x    Hip abduction  30x BTB 30x 30x BTB  30x BTB  30# 30x 30# 30x BTB 30x  30x    SLR R  3x10 3x10 3x10 3x10 3x10 3x10 3x10 3x10    R SAQ  2# 30x 2# 30x 2# 30x  2# 30x  2# 30x 3# 30x 3# 10x, 0# 2x10 0# 30x    R LAQ  2# 30x 2# 30x 2# 30x  2# 30x  2# 30x 0# 30x 0# 30x  0# 30x    R heel slides  30x 30x 30x  30x 30x 30x 30x 30x    Slant board   L2 30\" 4x L2 30\"x4  L2 " "30\"x4  L3 30\" 4x L2 30\" 4x L3 30\"x4 4x    Nustep for strength  5' L4 5' L4 L4 6'  L4 6'  L6 10' L5 10' L5 10'  10' L5                 Standing hip abd    20x  20x ea 2# 30x 0# 0# 3x10 0# 30x    Mini squats    15x  15x  30x 15x 15x  15x                                           Ther Activity             Step ups      6\" 30x 30x NV hold    Lat step down      6\" 10x nt NV  hold    Gait Training                                       Modalities             Ice after R knee  8' 10' Declined   10' 10' 10'  10'                                     "

## 2024-01-22 ENCOUNTER — APPOINTMENT (OUTPATIENT)
Dept: PHYSICAL THERAPY | Age: 69
End: 2024-01-22
Payer: MEDICARE

## 2024-01-25 ENCOUNTER — HOSPITAL ENCOUNTER (OUTPATIENT)
Dept: RADIOLOGY | Facility: HOSPITAL | Age: 69
End: 2024-01-25
Payer: MEDICARE

## 2024-01-25 ENCOUNTER — EVALUATION (OUTPATIENT)
Dept: PHYSICAL THERAPY | Age: 69
End: 2024-01-25
Payer: MEDICARE

## 2024-01-25 DIAGNOSIS — M17.11 PRIMARY OSTEOARTHRITIS OF RIGHT KNEE: Primary | ICD-10-CM

## 2024-01-25 DIAGNOSIS — Z96.651 STATUS POST TOTAL RIGHT KNEE REPLACEMENT: ICD-10-CM

## 2024-01-25 DIAGNOSIS — Z98.84 WEIGHT GAIN FOLLOWING GASTRIC BYPASS SURGERY: ICD-10-CM

## 2024-01-25 DIAGNOSIS — M25.561 ACUTE PAIN OF RIGHT KNEE: ICD-10-CM

## 2024-01-25 DIAGNOSIS — R63.5 WEIGHT GAIN FOLLOWING GASTRIC BYPASS SURGERY: ICD-10-CM

## 2024-01-25 PROCEDURE — 97110 THERAPEUTIC EXERCISES: CPT | Performed by: PHYSICAL THERAPIST

## 2024-01-25 PROCEDURE — 74240 X-RAY XM UPR GI TRC 1CNTRST: CPT

## 2024-01-25 NOTE — RESULT ENCOUNTER NOTE
Please advise patient that UGI shows diminished esophageal motility, small HH similar to prior studies, no fistula to suggest causes of concern for weight regain. Suggest continue with antiacids and continue with diet and lifestyle changes and consult with [facility]. Thank you

## 2024-01-25 NOTE — LETTER
2024    Quincy Lopez MD  [address] [facility] [address]    Patient: Paulina Sidhu   YOB: 1955   Date of Visit: 2024     Encounter Diagnosis     ICD-10-CM    1. Primary osteoarthritis of right knee  M17.11       2. Acute pain of right knee  M25.561       3. Status post total right knee replacement  Z96.651           Dear Dr. Lopez:    Thank you for your recent referral of Paulina Sidhu. Please review the attached evaluation summary from Paulina's recent visit.     Please verify that you agree with the plan of care by signing the attached order.     If you have any questions or concerns, please do not hesitate to call.     I sincerely appreciate the opportunity to share in the care of one of your patients and hope to have another opportunity to work with you in the near future.       Sincerely,    Ashley Rodas, PT      Referring Provider:      I certify that I have read the below Plan of Care and certify the need for these services furnished under this plan of treatment while under my care.                    Quincy Lopez MD  [address] [facility] [address]  Via Fax: [phone]          PT Re-Evaluation     Today's date: 2024  Patient name: Paulina Sidhu  : 1955  MRN: [id number]  Referring provider: Quincy Lopze MD  Dx:   Encounter Diagnosis     ICD-10-CM    1. Primary osteoarthritis of right knee  M17.11       2. Acute pain of right knee  M25.561       3. Status post total right knee replacement  Z96.651           Start Time: 1515  Stop Time: 1615  Total time in clinic (min): 60 minutes    Assessment  Assessment details: Paulina Sidhu is a 67 y.o. female who presents with pain, decreased strength, and decreased ROM. Due to these impairments, Patient has difficulty performing a/iadls and recreational activities. Patient's clinical presentation is consistent with their referring diagnosis of right knee pain. Patient  seen for post-op TKR. She is doing well. She fell 2 times since her surgery and has more c/o pain in right knee. She has seen ortho and had xrays and all is ok. Patient would benefit from skilled physical therapy to address their aforementioned impairments, improve their level of function and to improve their overall quality of life.  Impairments: abnormal or restricted ROM, activity intolerance, impaired physical strength, lacks appropriate home exercise program, pain with function, poor posture  and poor body mechanics    Symptom irritability: moderateUnderstanding of Dx/Px/POC: good   Prognosis: good    Goals  ST-3 WEEKS post-op  1.  Decrease pain right knee < 2/10 on VAS at its worst. Goal not met. Ongoing goal.   2.  Increase ROM right knee flexion > 128. Progressing towards. Ongoing goal.   3.  Increase right knee strength > 4+/5. Progressing towards. Ongoing goal.   4. Improve ambulation with least AD with safe technique on level surfaces. Progress made. Ongoing goal.     LT-6 WEEKS  1. Patient to be independent with a/iadls. Ongoing goal.   2. Increase functional activities for leisure and home activities to previous LOF. Ongoing goal.   3. Independent with HEP and/or fitness program. Ongoing goal.   4. Able to perform reciprocal stairs. Progressing towards. Ongoing goal.     Plan  Plan details:     Patient would benefit from: skilled physical therapy  Planned modality interventions: cryotherapy, thermotherapy: hydrocollator packs and unattended electrical stimulation  Planned therapy interventions: activity modification, behavior modification, body mechanics training, flexibility, functional ROM exercises, home exercise program, IADL retraining, joint mobilization, manual therapy, neuromuscular re-education, patient education, postural training, strengthening, stretching, therapeutic activities, therapeutic exercise, balance/weight bearing training and gait training  Frequency: 2-3x week.  Duration  in weeks: 8  Plan of Care beginning date: 2023  Plan of Care expiration date: 3/25/2024  Treatment plan discussed with: patient      Subjective Evaluation    History of Present Illness  Mechanism of injury: Patient here for pre-op TKR. Scheduled for surgery 23 and having home care for 4 weeks. Will resume OPT week 4.  23 Patient completed home care and did well. She is able to begin OPT. Staples were removed by home care. She has follow up tomorrow with MD. C/o pain in right knee. Stairs are still difficult. She does c/o left knee bucking at times. She states she is suppose to have the left knee replaced at some time also. She also c/o clicking in right knee also.   24: Im not sleeping well right now since the falls. She states she may have to get a spinal stimulator put in. She has TENS unit also putting on right shoulder. She states her knee is clicking more since the falls.           Recurrent probem    Patient Goals  Patient goals for therapy: decreased pain, increased motion, increased strength and return to sport/leisure activities  Patient goal: able to walk with no AD, able to do stairs better.  Pain  Current pain ratin  At best pain rating: 3  At worst pain ratin  Location: R knee pain  Quality: tight  Aggravating factors: walking, stair climbing and standing  Progression: improved    Social Support  Steps to enter house: yes  2  Stairs in house: yes   Lives in: multiple-level home  Lives with: spouse    Employment status: not working    Diagnostic Tests  X-ray: abnormal  Treatments  Previous treatment: injection treatment and physical therapy      Objective     Static Posture     Head  Forward.    Shoulders  Rounded.    Thoracic Spine  Hyperkyphosis.    Comments  Protruding abdomen moderate    Neurological Testing     Sensation     Knee   Left Knee   Intact: Light touch    Right Knee   Intact: light touch     Active Range of Motion   Left Knee   Flexion: 130 degrees    Extension: 0 degrees     Right Knee   Flexion: 115 degrees with pain  Extension: 0 degrees     Mobility   Patellar Mobility:   Left Knee   Hypomobile: left medial, left lateral, left superior and left inferior    Right Knee   WFL: medial, lateral, superior and inferior    Strength/Myotome Testing     Left Knee   Flexion: 4+  Extension: 4+    Right Knee   Flexion: 4  Extension: 4  Quadriceps contraction: good    Swelling     Left Knee Girth Measurement (cm)   Joint line: 44.5 cm  10 cm above joint line: 52 (suprapatella) cm    Right Knee Girth Measurement (cm)   Joint line: 45 cm  10 cm above joint line: 57.8 (suprapatella) cm    Ambulation   Weight-Bearing Status   Weight-Bearing Status (Right): weight-bearing as tolerated    Assistive device used: single point cane    Ambulation: Level Surfaces   Ambulation with assistive device: independent  Ambulation without assistive device: independent    Ambulation: Stairs   Pattern: non-reciprocal  Railings: one rail  Pattern: non-reciprocal  Railings: one rail    Observational Gait   Gait: antalgic   Decreased walking speed and stride length.     Functional Assessment        Single Leg Stance   Left: 3 seconds  Right: 3 seconds    Comments  TU seconds no AD was 9 seconds.              POC expires: 3/25/24  Date 11/16 12/18 12/21 12/29 1/2 1/5 1/8 1/15 1/18 1/25   Visit count 1 2 3 4 5 6 7 8 9 10   Re-eval  SG        SG   FOTO           POC expires: 3/25/24  Date             Visit count             Re-eval             FOTO                    Precautions: fusion L4-S1 2022, HTN, IBS, LBP, B RTC repair    Daily Treatment diary:    Manuals 11/16 12/18 12/21 12/29 1/2 1/5 1/8 1/15 1/18 1/25   R knee/ hamstring  8 8 8' 8' 8' 5' 5'  5 5'                                          Neuro Re-Ed                                                                                                        Ther Ex             HEP 25'            Hip adduction  30x ball 30x  "30x  30x 30# 30x 30# 30x Ball 30x  30x 30x   Hip abduction  30x BTB 30x 30x BTB  30x BTB  30# 30x 30# 30x BTB 30x  30x 30x   SLR R  3x10 3x10 3x10 3x10 3x10 3x10 3x10 3x10 3x10   R SAQ  2# 30x 2# 30x 2# 30x  2# 30x  2# 30x 3# 30x 3# 10x, 0# 2x10 0# 30x 1# 30x   R LAQ  2# 30x 2# 30x 2# 30x  2# 30x  2# 30x 0# 30x 0# 30x  0# 30x 1# 30x   R heel slides  30x 30x 30x  30x 30x 30x 30x 30x D/c   Slant board   L2 30\" 4x L2 30\"x4  L2 30\"x4  L3 30\" 4x L2 30\" 4x L3 30\"x4 4x 4x   Nustep for strength  5' L4 5' L4 L4 6'  L4 6'  L6 10' L5 10' L5 10'  10' L5 10' L5                Standing hip abd    20x  20x ea 2# 30x 0# 0# 3x10 0# 30x 1# 30x   Mini squats    15x  15x  30x 15x 15x  15x 30x                                          Ther Activity             Step ups      6\" 30x 30x NV hold 30x   Lat step down      6\" 10x nt NV  hold nt   Gait Training                                       Modalities             Ice after R knee  8' 10' Declined   10' 10' 10'  10' 10'                                       "

## 2024-01-25 NOTE — PROGRESS NOTES
"Bariatric Nutrition Assessment  Note    Transfer patient  -s/p open Nakita-En-Y Gastric Bypass - DS (multiple surgeries) with Dr. Cruz in [place] in 2000. -     CC: Weight regain    Height:5 '7\"  Current Weight: 239.4#   BMI: 37.5  Weight prior to Weight Loss Surgery: 315#  Weight in (lb) to have BMI = 25: 159.5  OSBALDO:170#  Regain: 70#    Vitamin Regimen: -NOT Currently taking adequate postop bariatric surgery vitamin supplementation: Includes B12 injections,  Vitamin C, Vitamin D 5,000IU, folic acid, potassium and pre/pro biotic   Reviewed  Bariatric MVI and calcium citrate 500mg TID   Bloodwork to be completed     Diet and exercise:    Tolerating a regular diet - Yes  3 meals: no daily  Snacking/grazing No  Volume:  Dr. Salguero in 2020.  UGI - \"small-moderate HH and moderate GERD.\" And EGD in December 2020 - he notes \"Gastric bypass anatomy with 50 cc gastric pouch.\"   Eating at least 60 grams of protein per day- not always   Protein drinks: None   Following 30/60 minute rule with liquids-yes  Eating/drinking: chewing food well- sipping fluids  Drinking at least 64 ounces of fluid per day-  Drinking carbonated beverages-no  Food logging - in past - not good at   GI discomforts Dx with IBS, + heartburn - has dumping - hypoglycemiia   Exercise:   Other: Retired paraprofessional. Has 3 daughters and  who also had WLS - now needing kidney transplant  Diet Recall:   B - 1 cup coffee w/ egg and toasted bagel or english muffin  L - skips or pizza or biscuit  D - chicken or pasta or fish or venison or barley and mushroom soup of stuffed pepper  HS - chips or pretzels     Fluids - 16oz coffee w/ creamer, 32oz water, sugary iced tea some days, cranberry juice 6oz     Visit Summary  1/26/2024  Transfer patient - has established care at our office. > 20year post op - open procedure - developed intractable diarrhea and underwent an unknown laparoscopic revision x 2 with her same surgeon in 2001 and 2003, which she believes " was a RYGB as well as multiple HH repairs. Maintained her weight for years until 2012 when she retired and started to regain weight. Also noted  increased heartburn despite taking high dose PPI. Reports she loves carbs - has no will power - though her portions limited per restrictive surgery  Reviewed guidelines to optimize use of tool  Advised food logging and calories/macros to aim for. Advised either protein barshake or yogurt for added protein in diet. Questions answered. Pt receptive.    Goals  Follow post op guidelines   Food log -Aim for 1200 calories 75 gram Protein - Continue FOODMAP diet for IBS  Vitamin regimen as discussed - completed bloodwork  Exercise as able  F/U surgical RD as needed  To continue with [facility] program    Time spent: 45 minutes

## 2024-01-25 NOTE — PROGRESS NOTES
PT Re-Evaluation     Today's date: 2024  Patient name: Paulina Sidhu  : 1955  MRN: [id number]  Referring provider: Quincy Lopez MD  Dx:   Encounter Diagnosis     ICD-10-CM    1. Primary osteoarthritis of right knee  M17.11       2. Acute pain of right knee  M25.561       3. Status post total right knee replacement  Z96.651           Start Time: 1515  Stop Time: 1615  Total time in clinic (min): 60 minutes    Assessment  Assessment details: Paulina Sidhu is a 67 y.o. female who presents with pain, decreased strength, and decreased ROM. Due to these impairments, Patient has difficulty performing a/iadls and recreational activities. Patient's clinical presentation is consistent with their referring diagnosis of right knee pain. Patient seen for post-op TKR. She is doing well. She fell 2 times since her surgery and has more c/o pain in right knee. She has seen ortho and had xrays and all is ok. Patient would benefit from skilled physical therapy to address their aforementioned impairments, improve their level of function and to improve their overall quality of life.  Impairments: abnormal or restricted ROM, activity intolerance, impaired physical strength, lacks appropriate home exercise program, pain with function, poor posture  and poor body mechanics    Symptom irritability: moderateUnderstanding of Dx/Px/POC: good   Prognosis: good    Goals  ST-3 WEEKS post-op  1.  Decrease pain right knee < 2/10 on VAS at its worst. Goal not met. Ongoing goal.   2.  Increase ROM right knee flexion > 128. Progressing towards. Ongoing goal.   3.  Increase right knee strength > 4+/5. Progressing towards. Ongoing goal.   4. Improve ambulation with least AD with safe technique on level surfaces. Progress made. Ongoing goal.     LT-6 WEEKS  1. Patient to be independent with a/iadls. Ongoing goal.   2. Increase functional activities for leisure and home activities to previous LOF. Ongoing goal.   3.  Independent with HEP and/or fitness program. Ongoing goal.   4. Able to perform reciprocal stairs. Progressing towards. Ongoing goal.     Plan  Plan details:     Patient would benefit from: skilled physical therapy  Planned modality interventions: cryotherapy, thermotherapy: hydrocollator packs and unattended electrical stimulation  Planned therapy interventions: activity modification, behavior modification, body mechanics training, flexibility, functional ROM exercises, home exercise program, IADL retraining, joint mobilization, manual therapy, neuromuscular re-education, patient education, postural training, strengthening, stretching, therapeutic activities, therapeutic exercise, balance/weight bearing training and gait training  Frequency: 2-3x week.  Duration in weeks: 8  Plan of Care beginning date: 12/18/2023  Plan of Care expiration date: 3/25/2024  Treatment plan discussed with: patient      Subjective Evaluation    History of Present Illness  Mechanism of injury: Patient here for pre-op TKR. Scheduled for surgery 11/20/23 and having home care for 4 weeks. Will resume OPT week 4.  12/18/23 Patient completed home care and did well. She is able to begin OPT. Staples were removed by home care. She has follow up tomorrow with MD. C/o pain in right knee. Stairs are still difficult. She does c/o left knee bucking at times. She states she is suppose to have the left knee replaced at some time also. She also c/o clicking in right knee also.   1/25/24: Im not sleeping well right now since the falls. She states she may have to get a spinal stimulator put in. She has TENS unit also putting on right shoulder. She states her knee is clicking more since the falls.           Recurrent probem    Patient Goals  Patient goals for therapy: decreased pain, increased motion, increased strength and return to sport/leisure activities  Patient goal: able to walk with no AD, able to do stairs better.  Pain  Current pain rating:  5  At best pain rating: 3  At worst pain ratin  Location: R knee pain  Quality: tight  Aggravating factors: walking, stair climbing and standing  Progression: improved    Social Support  Steps to enter house: yes  2  Stairs in house: yes   Lives in: multiple-level home  Lives with: spouse    Employment status: not working    Diagnostic Tests  X-ray: abnormal  Treatments  Previous treatment: injection treatment and physical therapy      Objective     Static Posture     Head  Forward.    Shoulders  Rounded.    Thoracic Spine  Hyperkyphosis.    Comments  Protruding abdomen moderate    Neurological Testing     Sensation     Knee   Left Knee   Intact: Light touch    Right Knee   Intact: light touch     Active Range of Motion   Left Knee   Flexion: 130 degrees   Extension: 0 degrees     Right Knee   Flexion: 115 degrees with pain  Extension: 0 degrees     Mobility   Patellar Mobility:   Left Knee   Hypomobile: left medial, left lateral, left superior and left inferior    Right Knee   WFL: medial, lateral, superior and inferior    Strength/Myotome Testing     Left Knee   Flexion: 4+  Extension: 4+    Right Knee   Flexion: 4  Extension: 4  Quadriceps contraction: good    Swelling     Left Knee Girth Measurement (cm)   Joint line: 44.5 cm  10 cm above joint line: 52 (suprapatella) cm    Right Knee Girth Measurement (cm)   Joint line: 45 cm  10 cm above joint line: 57.8 (suprapatella) cm    Ambulation   Weight-Bearing Status   Weight-Bearing Status (Right): weight-bearing as tolerated    Assistive device used: single point cane    Ambulation: Level Surfaces   Ambulation with assistive device: independent  Ambulation without assistive device: independent    Ambulation: Stairs   Pattern: non-reciprocal  Railings: one rail  Pattern: non-reciprocal  Railings: one rail    Observational Gait   Gait: antalgic   Decreased walking speed and stride length.     Functional Assessment        Single Leg Stance   Left: 3 seconds  Right:  "3 seconds    Comments  TU seconds no AD was 9 seconds.              POC expires: 3/25/24  Date 11/16 12/18 12/21 12/29 1/2 1/5 1/8 1/15 1/18 1/25   Visit count 1 2 3 4 5 6 7 8 9 10   Re-eval  SG        SG   FOTO           POC expires: 3/25/24  Date             Visit count             Re-eval             FOTO                    Precautions: fusion L4-S1 2022, HTN, IBS, LBP, B RTC repair    Daily Treatment diary:    Manuals  1/2 1/5 1/8 1/15 1/18 1/25   R knee/ hamstring  8 8 8' 8' 8' 5' 5'  5 5'                                          Neuro Re-Ed                                                                                                        Ther Ex             HEP 25'            Hip adduction  30x ball 30x 30x  30x 30# 30x 30# 30x Ball 30x  30x 30x   Hip abduction  30x BTB 30x 30x BTB  30x BTB  30# 30x 30# 30x BTB 30x  30x 30x   SLR R  3x10 3x10 3x10 3x10 3x10 3x10 3x10 3x10 3x10   R SAQ  2# 30x 2# 30x 2# 30x  2# 30x  2# 30x 3# 30x 3# 10x, 0# 2x10 0# 30x 1# 30x   R LAQ  2# 30x 2# 30x 2# 30x  2# 30x  2# 30x 0# 30x 0# 30x  0# 30x 1# 30x   R heel slides  30x 30x 30x  30x 30x 30x 30x 30x D/c   Slant board   L2 30\" 4x L2 30\"x4  L2 30\"x4  L3 30\" 4x L2 30\" 4x L3 30\"x4 4x 4x   Nustep for strength  5' L4 5' L4 L4 6'  L4 6'  L6 10' L5 10' L5 10'  10' L5 10' L5                Standing hip abd    20x  20x ea 2# 30x 0# 0# 3x10 0# 30x 1# 30x   Mini squats    15x  15x  30x 15x 15x  15x 30x                                          Ther Activity             Step ups      6\" 30x 30x NV hold 30x   Lat step down      6\" 10x nt NV  hold nt   Gait Training                                       Modalities             Ice after R knee  8' 10' Declined   10' 10' 10'  10' 10'                         "

## 2024-01-26 ENCOUNTER — OFFICE VISIT (OUTPATIENT)
Dept: BARIATRICS | Facility: CLINIC | Age: 69
End: 2024-01-26

## 2024-01-26 ENCOUNTER — CLINICAL SUPPORT (OUTPATIENT)
Dept: BARIATRICS | Facility: CLINIC | Age: 69
End: 2024-01-26

## 2024-01-26 VITALS — BODY MASS INDEX: 37.5 KG/M2 | WEIGHT: 239.4 LBS

## 2024-01-26 DIAGNOSIS — R63.5 WEIGHT GAIN FOLLOWING GASTRIC BYPASS SURGERY: Primary | ICD-10-CM

## 2024-01-26 DIAGNOSIS — Z98.84 WEIGHT GAIN FOLLOWING GASTRIC BYPASS SURGERY: Primary | ICD-10-CM

## 2024-01-26 DIAGNOSIS — R63.5 ABNORMAL WEIGHT GAIN: Primary | ICD-10-CM

## 2024-01-26 PROCEDURE — WMDI30

## 2024-01-26 PROCEDURE — RECHECK

## 2024-01-26 NOTE — PROGRESS NOTES
Post op transfer patient. S/P open RNY- DS in [place] in 2000.   Started gaining weight back after retiring in 2012, previously had maintained weigh loss.  Dr Cunha scheduled 2/29/24. 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 Still very angry about this. Patient lives with her . Has 3 adult children and 2 adult step children 4 step grandchildren. Got  to her second  later in life, first  is the father to her children. Stress with  in kidney failure. Youngest daughter has mental health issues- Bipolar and OCD. First  cheated, passed away in March 2023. Lost her dog May 2023,  doesn't want to get another one. May start fostering dogs.Patient involved in the [employer] and [employer] club. Will skip meals- typically lunch. Drinking 32 oz herbal tea, seltzer, water, and soup, and 10-16 oz coffee. Mindless snacking at times. Has a therapist- Yumiko Edwards with [facility] and takes medication for depression.  Goals discussed:   Increase water intake  Be mindful not to skip meals  Be mindful of mindless snaking  Angely Durbin LCSW

## 2024-01-29 ENCOUNTER — OFFICE VISIT (OUTPATIENT)
Dept: PHYSICAL THERAPY | Age: 69
End: 2024-01-29
Payer: MEDICARE

## 2024-01-29 DIAGNOSIS — M17.11 PRIMARY OSTEOARTHRITIS OF RIGHT KNEE: Primary | ICD-10-CM

## 2024-01-29 DIAGNOSIS — Z96.651 STATUS POST TOTAL RIGHT KNEE REPLACEMENT: ICD-10-CM

## 2024-01-29 DIAGNOSIS — M25.561 ACUTE PAIN OF RIGHT KNEE: ICD-10-CM

## 2024-01-29 PROCEDURE — 97110 THERAPEUTIC EXERCISES: CPT | Performed by: PHYSICAL THERAPIST

## 2024-01-29 NOTE — PROGRESS NOTES
"Daily Note     Today's date: 2024  Patient name: Paulina Sidhu  : 1955  MRN: [id number]  Referring provider: Quincy Lopez MD  Dx:   Encounter Diagnosis     ICD-10-CM    1. Primary osteoarthritis of right knee  M17.11       2. Acute pain of right knee  M25.561       3. Status post total right knee replacement  Z96.651           Start Time: 1047  Stop Time: 1135  Total time in clinic (min): 48 minutes    Subjective: Patient reports turning in bed wakes her up with pain in her knee and back.       Objective: See treatment diary below      Assessment: Tolerated treatment well. Patient would benefit from continued PT. Patient completes program well. Good ROM right knee. Decreased swelling, mild scar tissue supra patella.       Plan: Continue per plan of care.      POC expires: 3/25/24  Date 11/16 12/18 12/21 12/29 1/2 1/5 1/8 1/15 1/18 1/25   Visit count 1 2 3 4 5 6 7 8 9 10   Re-eval  SG        SG   FOTO  49/64     52/64         POC expires: 3/25/24  Date             Visit count 11            Re-eval             FOTO                    Precautions: fusion L4-S1 2022, HTN, IBS, LBP, B RTC repair    Daily Treatment diary:    Manuals 1/29   12/29 1/2 1/5 1/8 1/15 1/18 1/25   R knee/ hamstring 5'   8' 8' 8' 5' 5'  5 5'                                          Neuro Re-Ed                                                                                                        Ther Ex             HEP             Hip abd- cybex 30# 30x            Hip adduction 30x ball   30x  30x 30# 30x 30# 30x Ball 30x  30x 30x   Hip abduction 30x   30x BTB  30x BTB  30# 30x 30# 30x BTB 30x  30x 30x   SLR R 3x10   3x10 3x10 3x10 3x10 3x10 3x10 3x10   R SAQ 1# 30x   2# 30x  2# 30x  2# 30x 3# 30x 3# 10x, 0# 2x10 0# 30x 1# 30x   R LAQ 1# 30x   2# 30x  2# 30x  2# 30x 0# 30x 0# 30x  0# 30x 1# 30x                Slant board 4x L3   L2 30\"x4  L2 30\"x4  L3 30\" 4x L2 30\" 4x L3 30\"x4 4x 4x   Nustep for strength 10' L5   L4 6'  " "L4 6'  L6 10' L5 10' L5 10'  10' L5 10' L5                Standing hip abd 1# 30x   20x  20x ea 2# 30x 0# 0# 3x10 0# 30x 1# 30x   Mini squats nt   15x  15x  30x 15x 15x  15x 30x                                          Ther Activity             Step ups 30x     6\" 30x 30x NV hold 30x   Lat step down      6\" 10x nt NV  hold nt   Gait Training                                       Modalities             Ice after R knee 10'   Declined   10' 10' 10'  10' 10'                              "

## 2024-01-31 ENCOUNTER — LAB (OUTPATIENT)
Dept: LAB | Facility: CLINIC | Age: 69
End: 2024-01-31
Payer: MEDICARE

## 2024-01-31 DIAGNOSIS — K91.2 POSTSURGICAL MALABSORPTION: ICD-10-CM

## 2024-01-31 DIAGNOSIS — E66.9 OBESITY, CLASS II, BMI 35-39.9: ICD-10-CM

## 2024-01-31 PROCEDURE — 85025 COMPLETE CBC W/AUTO DIFF WBC: CPT

## 2024-01-31 PROCEDURE — 82728 ASSAY OF FERRITIN: CPT

## 2024-01-31 PROCEDURE — 36415 COLL VENOUS BLD VENIPUNCTURE: CPT

## 2024-01-31 PROCEDURE — 83970 ASSAY OF PARATHORMONE: CPT

## 2024-01-31 PROCEDURE — 82306 VITAMIN D 25 HYDROXY: CPT

## 2024-01-31 PROCEDURE — 82607 VITAMIN B-12: CPT

## 2024-01-31 PROCEDURE — 83540 ASSAY OF IRON: CPT

## 2024-01-31 PROCEDURE — 84597 ASSAY OF VITAMIN K: CPT

## 2024-01-31 PROCEDURE — 84446 ASSAY OF VITAMIN E: CPT

## 2024-01-31 PROCEDURE — 84630 ASSAY OF ZINC: CPT

## 2024-01-31 PROCEDURE — 82746 ASSAY OF FOLIC ACID SERUM: CPT

## 2024-01-31 PROCEDURE — 84590 ASSAY OF VITAMIN A: CPT

## 2024-01-31 PROCEDURE — 84425 ASSAY OF VITAMIN B-1: CPT

## 2024-01-31 PROCEDURE — 80053 COMPREHEN METABOLIC PANEL: CPT

## 2024-01-31 PROCEDURE — 83550 IRON BINDING TEST: CPT

## 2024-02-01 ENCOUNTER — TELEPHONE (OUTPATIENT)
Dept: BARIATRICS | Facility: CLINIC | Age: 69
End: 2024-02-01

## 2024-02-01 ENCOUNTER — OFFICE VISIT (OUTPATIENT)
Dept: PHYSICAL THERAPY | Age: 69
End: 2024-02-01
Payer: MEDICARE

## 2024-02-01 ENCOUNTER — PATIENT MESSAGE (OUTPATIENT)
Dept: BARIATRICS | Facility: CLINIC | Age: 69
End: 2024-02-01

## 2024-02-01 DIAGNOSIS — M17.11 PRIMARY OSTEOARTHRITIS OF RIGHT KNEE: Primary | ICD-10-CM

## 2024-02-01 DIAGNOSIS — R79.0 LOW FERRITIN: ICD-10-CM

## 2024-02-01 DIAGNOSIS — Z96.651 STATUS POST TOTAL RIGHT KNEE REPLACEMENT: ICD-10-CM

## 2024-02-01 DIAGNOSIS — M25.561 ACUTE PAIN OF RIGHT KNEE: ICD-10-CM

## 2024-02-01 DIAGNOSIS — K91.2 POSTSURGICAL MALABSORPTION: Primary | ICD-10-CM

## 2024-02-01 DIAGNOSIS — E53.8 VITAMIN B12 DEFICIENCY: ICD-10-CM

## 2024-02-01 LAB
25(OH)D3 SERPL-MCNC: 42.1 NG/ML (ref 30–100)
ALBUMIN SERPL BCP-MCNC: 4.2 G/DL (ref 3.5–5)
ALP SERPL-CCNC: 94 U/L (ref 34–104)
ALT SERPL W P-5'-P-CCNC: 14 U/L (ref 7–52)
ANION GAP SERPL CALCULATED.3IONS-SCNC: 10 MMOL/L
AST SERPL W P-5'-P-CCNC: 13 U/L (ref 13–39)
BASOPHILS # BLD AUTO: 0.02 THOUSANDS/ÂΜL (ref 0–0.1)
BASOPHILS NFR BLD AUTO: 0 % (ref 0–1)
BILIRUB SERPL-MCNC: 0.95 MG/DL (ref 0.2–1)
BUN SERPL-MCNC: 18 MG/DL (ref 5–25)
CALCIUM SERPL-MCNC: 10.1 MG/DL (ref 8.4–10.2)
CHLORIDE SERPL-SCNC: 102 MMOL/L (ref 96–108)
CO2 SERPL-SCNC: 26 MMOL/L (ref 21–32)
CREAT SERPL-MCNC: 0.86 MG/DL (ref 0.6–1.3)
EOSINOPHIL # BLD AUTO: 0.17 THOUSAND/ÂΜL (ref 0–0.61)
EOSINOPHIL NFR BLD AUTO: 4 % (ref 0–6)
ERYTHROCYTE [DISTWIDTH] IN BLOOD BY AUTOMATED COUNT: 14.8 % (ref 11.6–15.1)
FERRITIN SERPL-MCNC: 15 NG/ML (ref 11–307)
FOLATE SERPL-MCNC: >22.3 NG/ML
GFR SERPL CREATININE-BSD FRML MDRD: 69 ML/MIN/1.73SQ M
GLUCOSE P FAST SERPL-MCNC: 88 MG/DL (ref 65–99)
HCT VFR BLD AUTO: 39.8 % (ref 34.8–46.1)
HGB BLD-MCNC: 12.4 G/DL (ref 11.5–15.4)
IMM GRANULOCYTES # BLD AUTO: 0.01 THOUSAND/UL (ref 0–0.2)
IMM GRANULOCYTES NFR BLD AUTO: 0 % (ref 0–2)
IRON SATN MFR SERPL: 26 % (ref 15–50)
IRON SERPL-MCNC: 124 UG/DL (ref 50–212)
LYMPHOCYTES # BLD AUTO: 1.4 THOUSANDS/ÂΜL (ref 0.6–4.47)
LYMPHOCYTES NFR BLD AUTO: 31 % (ref 14–44)
MCH RBC QN AUTO: 27.9 PG (ref 26.8–34.3)
MCHC RBC AUTO-ENTMCNC: 31.2 G/DL (ref 31.4–37.4)
MCV RBC AUTO: 89 FL (ref 82–98)
MONOCYTES # BLD AUTO: 0.32 THOUSAND/ÂΜL (ref 0.17–1.22)
MONOCYTES NFR BLD AUTO: 7 % (ref 4–12)
NEUTROPHILS # BLD AUTO: 2.67 THOUSANDS/ÂΜL (ref 1.85–7.62)
NEUTS SEG NFR BLD AUTO: 58 % (ref 43–75)
NRBC BLD AUTO-RTO: 0 /100 WBCS
PLATELET # BLD AUTO: 270 THOUSANDS/UL (ref 149–390)
PMV BLD AUTO: 12 FL (ref 8.9–12.7)
POTASSIUM SERPL-SCNC: 4.1 MMOL/L (ref 3.5–5.3)
PROT SERPL-MCNC: 7 G/DL (ref 6.4–8.4)
PTH-INTACT SERPL-MCNC: 61.3 PG/ML (ref 12–88)
RBC # BLD AUTO: 4.45 MILLION/UL (ref 3.81–5.12)
SODIUM SERPL-SCNC: 138 MMOL/L (ref 135–147)
TIBC SERPL-MCNC: 476 UG/DL (ref 250–450)
UIBC SERPL-MCNC: 352 UG/DL (ref 155–355)
VIT B12 SERPL-MCNC: 172 PG/ML (ref 180–914)
WBC # BLD AUTO: 4.59 THOUSAND/UL (ref 4.31–10.16)

## 2024-02-01 PROCEDURE — 97110 THERAPEUTIC EXERCISES: CPT

## 2024-02-01 NOTE — RESULT ENCOUNTER NOTE
Please advise patient of labs:    -B12 deficiency -  Take an additional 2,000mcg sublingual B12 daily x 3 months. This can be found inexpensively OTC. I also recommend 1,000mcg IM B12 shots in the office; once weekly x 4 doses.    -Ferritin (iron stores) are low - Needs to start Jamie MVI with 45mg iron and OTC Vitron C, Blood builder, or Slow Fe daily if tolerated and repeat labs in 3 months    Thank you

## 2024-02-01 NOTE — TELEPHONE ENCOUNTER
Left message on patient's voice mail regarding labs. Informed message sent to patient's MY CHART PORTAL. Requested patient call to schedule B12 injections.

## 2024-02-01 NOTE — PROGRESS NOTES
Daily Note     Today's date: 2024  Patient name: Paulina Sidhu  : 1955  MRN: [id number]  Referring provider: Quincy Lopez MD  Dx:   Encounter Diagnosis     ICD-10-CM    1. Primary osteoarthritis of right knee  M17.11       2. Acute pain of right knee  M25.561       3. Status post total right knee replacement  Z96.651           Start Time: 1000  Stop Time: 1100  Total time in clinic (min): 60 minutes    Subjective: Reports her knee is sore today      Objective: See treatment diary below      Assessment: Discomfort at knee and calf with ext PROM. Minimal restrictions noted with R knee flexion PROM, no guarding or report of pain at end range. Cues for carryover of program and to ensure proper dosage of prescribed exercises are performed. Cues for proper weight shifting during squats to avoid ant translation of tibia. Patient would benefit from continued PT      Plan: Continue per plan of care.      POC expires: 3/25/24  Date 11/16 12/18 12/21 12/29 1/2 1/5 1/8 1/15 1/18 1/25   Visit count 1 2 3 4 5 6 7 8 9 10   Re-eval  SG        SG   FOTO  49/64     52/64         POC expires: 3/25/24  Date            Visit count 11 12           Re-eval             FOTO                    Precautions: fusion L4-S1 2022, HTN, IBS, LBP, B RTC repair    Daily Treatment diary:    Manuals 1/29 2/1  12/29 1/2 1/5 1/8 1/15 1/18 1/25   R knee/ hamstring 5' 5'   8' 8' 8' 5' 5'  5 5'                                          Neuro Re-Ed                                                                                                        Ther Ex             HEP             Hip abd- cybex 30# 30x 30# 30x            Hip adduction 30x ball Ball 30x   30x  30x 30# 30x 30# 30x Ball 30x  30x 30x   Hip abduction 30x D/C   30x BTB  30x BTB  30# 30x 30# 30x BTB 30x  30x 30x   SLR R 3x10 3x10   3x10 3x10 3x10 3x10 3x10 3x10 3x10   R SAQ 1# 30x 1# 30x   2# 30x  2# 30x  2# 30x 3# 30x 3# 10x, 0# 2x10 0# 30x 1# 30x   R LAQ 1# 30x  "1# 30x   2# 30x  2# 30x  2# 30x 0# 30x 0# 30x  0# 30x 1# 30x                Slant board 4x L3 L3 30\"x4   L2 30\"x4  L2 30\"x4  L3 30\" 4x L2 30\" 4x L3 30\"x4 4x 4x   Nustep for strength 10' L5 L5 10'   L4 6'  L4 6'  L6 10' L5 10' L5 10'  10' L5 10' L5                Standing hip abd 1# 30x 1# 30x  20x  20x ea 2# 30x 0# 0# 3x10 0# 30x 1# 30x   Mini squats nt 27x   15x  15x  30x 15x 15x  15x 30x   TKE ball on wall   20x                                      Ther Activity             Step ups 30x 6\" 30x     6\" 30x 30x NV hold 30x   Lat step down      6\" 10x nt NV  hold nt   Gait Training                                       Modalities             Ice after R knee 10' 10'  Declined   10' 10' 10'  10' 10'                                "

## 2024-02-02 NOTE — TELEPHONE ENCOUNTER
Good Day Dorys,    Please see patient note and advise staff your thoughts. Did request patient call office on voice mail.    Thank you.

## 2024-02-02 NOTE — PATIENT COMMUNICATION
"Pt calling back, relayed message regarding calcium, pt already aware. Pt requesting rx for syringes, noted b12 injection rx that was sent was \"kit\", pt will check with pharmacy and let us know of any insurance issues and needs syringes sent differently. No other questions at this time.  "

## 2024-02-02 NOTE — TELEPHONE ENCOUNTER
Called patient to discuss lab results:    -B12 deficiency (172) -  Take an additional 2,000mcg sublingual B12 daily x 3 months. This can be found inexpensively OTC. I also recommend 1,000mcg IM B12 shots in the office; once weekly x 4 doses. - she already has B12 injections that she gives herself at home -sent refill x 4 doses     -Ferritin (iron stores) are low (15) - Needs to start Jamie MVI with 45mg iron and OTC Vitron C, Blood builder, or Slow Fe daily if tolerated and repeat labs in 3 months

## 2024-02-05 ENCOUNTER — OFFICE VISIT (OUTPATIENT)
Dept: PHYSICAL THERAPY | Age: 69
End: 2024-02-05
Payer: MEDICARE

## 2024-02-05 DIAGNOSIS — M25.561 ACUTE PAIN OF RIGHT KNEE: ICD-10-CM

## 2024-02-05 DIAGNOSIS — Z96.651 STATUS POST TOTAL RIGHT KNEE REPLACEMENT: ICD-10-CM

## 2024-02-05 DIAGNOSIS — M17.11 PRIMARY OSTEOARTHRITIS OF RIGHT KNEE: Primary | ICD-10-CM

## 2024-02-05 PROCEDURE — 97140 MANUAL THERAPY 1/> REGIONS: CPT

## 2024-02-05 PROCEDURE — 97110 THERAPEUTIC EXERCISES: CPT

## 2024-02-05 NOTE — PROGRESS NOTES
"Daily Note     Today's date: 2024  Patient name: Paulina Sidhu  : 1955  MRN: [id number]  Referring provider: Quincy Lopez MD  Dx:   Encounter Diagnosis     ICD-10-CM    1. Primary osteoarthritis of right knee  M17.11       2. Acute pain of right knee  M25.561       3. Status post total right knee replacement  Z96.651           Start Time: 1000  Stop Time: 1050  Total time in clinic (min): 50 minutes    Subjective: Reports increased pain this morning      Objective: See treatment diary below      Assessment: Painful with PROM R knee extension that eased when OP was released. Cues for carryover of program and some encouragement to complete program as documented below. Emphasis on proper weight shifting during squats, demonstrations for proper form. Patient would benefit from continued PT      Plan: Continue per plan of care.      POC expires: 3/25/24  Date 11/16 12/18 12/21 12/29 1/2 1/5 1/8 1/15 1/18 1/25   Visit count 1 2 3 4 5 6 7 8 9 10   Re-eval  SG        SG   FOTO           POC expires: 3/25/24  Date           Visit count 11 12 13          Re-eval             FOTO                    Precautions: fusion L4-S1 2022, HTN, IBS, LBP, B RTC repair    Daily Treatment diary:    Manuals 1/29 2/1 2/5  1/2 1/5 1/8 1/15 1/18 1/25   R knee/ hamstring 5' 5'  5'   8' 8' 5' 5'  5 5'                                          Neuro Re-Ed                                                                                                        Ther Ex             HEP             Hip abd- cybex 30# 30x 30# 30x  30# 30x           Hip adduction 30x ball Ball 30x  30# 30x   30x 30# 30x 30# 30x Ball 30x  30x 30x   Hip abduction 30x D/C    30x BTB  30# 30x 30# 30x BTB 30x  30x 30x   SLR R 3x10 3x10  3x10   3x10 3x10 3x10 3x10 3x10 3x10   QS   5\"x20           R SAQ 1# 30x 1# 30x  1# 30x   2# 30x  2# 30x 3# 30x 3# 10x, 0# 2x10 0# 30x 1# 30x   R LAQ 1# 30x 1# 30x  1# 30x   2# 30x  2# 30x 0# 30x " "0# 30x  0# 30x 1# 30x                Slant board 4x L3 L3 30\"x4  L3 30\"x4   L2 30\"x4  L3 30\" 4x L2 30\" 4x L3 30\"x4 4x 4x   Nustep for strength 10' L5 L5 10'  L5 10'   L4 6'  L6 10' L5 10' L5 10'  10' L5 10' L5                Standing hip abd 1# 30x 1# 30x 1# 30x  20x ea 2# 30x 0# 0# 3x10 0# 30x 1# 30x   Mini squats nt 27x  30x  15x  30x 15x 15x  15x 30x   TKE ball on wall   20x  20x                                     Ther Activity             Step ups 30x 6\" 30x  6\" 30x    6\" 30x 30x NV hold 30x   Lat step down      6\" 10x nt NV  hold nt   Gait Training                                       Modalities             Ice after R knee 10' 10' 10'   10' 10' 10'  10' 10'                                  "

## 2024-02-06 LAB — VIT B1 BLD-SCNC: 131.3 NMOL/L (ref 66.5–200)

## 2024-02-07 LAB — VIT A SERPL-MCNC: 38 UG/DL (ref 22–69.5)

## 2024-02-08 ENCOUNTER — APPOINTMENT (OUTPATIENT)
Dept: PHYSICAL THERAPY | Age: 69
End: 2024-02-08
Payer: MEDICARE

## 2024-02-08 LAB
A-TOCOPHEROL VIT E SERPL-MCNC: 10.9 MG/L (ref 9–29)
GAMMA TOCOPHEROL SERPL-MCNC: 1.1 MG/L (ref 0.5–4.9)

## 2024-02-08 RX ORDER — BENZONATATE 200 MG/1
CAPSULE ORAL
COMMUNITY
Start: 2024-01-03

## 2024-02-08 RX ORDER — PHENAZOPYRIDINE HYDROCHLORIDE 200 MG/1
1 TABLET, FILM COATED ORAL 3 TIMES DAILY PRN
COMMUNITY

## 2024-02-08 RX ORDER — DEXTROMETHORPHAN HYDROBROMIDE AND PROMETHAZINE HYDROCHLORIDE 15; 6.25 MG/5ML; MG/5ML
SYRUP ORAL
COMMUNITY
Start: 2023-12-30

## 2024-02-09 ENCOUNTER — OFFICE VISIT (OUTPATIENT)
Dept: GASTROENTEROLOGY | Facility: CLINIC | Age: 69
End: 2024-02-09
Payer: MEDICARE

## 2024-02-09 VITALS
HEART RATE: 65 BPM | OXYGEN SATURATION: 96 % | SYSTOLIC BLOOD PRESSURE: 115 MMHG | WEIGHT: 235 LBS | HEIGHT: 67 IN | BODY MASS INDEX: 36.88 KG/M2 | DIASTOLIC BLOOD PRESSURE: 71 MMHG

## 2024-02-09 DIAGNOSIS — R10.11 RIGHT UPPER QUADRANT ABDOMINAL PAIN: ICD-10-CM

## 2024-02-09 DIAGNOSIS — K21.9 GASTROESOPHAGEAL REFLUX DISEASE WITHOUT ESOPHAGITIS: Primary | ICD-10-CM

## 2024-02-09 DIAGNOSIS — E66.01 OBESITY, MORBID (HCC): ICD-10-CM

## 2024-02-09 DIAGNOSIS — K58.0 IRRITABLE BOWEL SYNDROME WITH DIARRHEA: ICD-10-CM

## 2024-02-09 LAB — PHYTONADIONE SERPL-MCNC: 0.28 NG/ML (ref 0.1–2.2)

## 2024-02-09 PROCEDURE — 99214 OFFICE O/P EST MOD 30 MIN: CPT | Performed by: INTERNAL MEDICINE

## 2024-02-09 NOTE — PROGRESS NOTES
[facility] - Outpatient Follow-up Note  Paulina Sidhu 68 y.o. female MRN: [id number]  Encounter: [id number]          ASSESSMENT AND PLAN:      1. Gastroesophageal reflux disease without esophagitis  - EGD; Future    2. Irritable bowel syndrome with diarrhea/constipation    3. Right upper quadrant abdominal pain  - CT abdomen pelvis w contrast; Future  - EGD; Future    4. Obesity, morbid (HCC)    ______________________________________________________________________    SUBJECTIVE: 68-year-old female with problems with irritable bowel syndrome and gastroesophageal reflux disease.  She experiences both alternating diarrhea and constipation.  She is now experiencing a right upper quadrant abdominal pain over the past several months.  There is no associated nausea or vomiting.  She is always experiencing heartburn symptoms despite taking esomeprazole twice daily and famotidine every day.  She denies rectal bleeding, melena, bloating, gaseousness.   History of gastric bypass operation with a Nakita-en-Y.  Upper GI x-ray was performed on January 25, 2024.  The results showed postsurgical changes of a Nakita-en-Y gastric bypass with a slightly increased size of the gastric pouch from the prior examination and no evidence of an gastrogastric fistula.  There is a small hiatal hernia.  There is mildly diminished esophageal motility.      Historical Information   Past Medical History:   Diagnosis Date    Arthritis     Colon polyp     CPAP (continuous positive airway pressure) dependence     Fibromyalgia, primary     GERD (gastroesophageal reflux disease)     Hypertension     Hypoglycemia     Irritable bowel syndrome     Liver disease     Osteoporosis     Pneumonia     Rotator cuff tear, non-traumatic, right     Sleep apnea     Stenosis of cervical spine     and neck     Past Surgical History:   Procedure Laterality Date    CARPAL TUNNEL RELEASE      CATARACT EXTRACTION, BILATERAL      w/ repair of  "retina tear    CHOLECYSTECTOMY      COLONOSCOPY      GASTRIC BYPASS      HYSTERECTOMY      KNEE SURGERY      LUNG SURGERY      per patient \"washing out\"    REPLACEMENT TOTAL KNEE Right 2023    SINUS SURGERY      TONSILLECTOMY       Social History   Social History     Substance and Sexual Activity   Alcohol Use Yes    Comment: occasional     Social History     Substance and Sexual Activity   Drug Use Not Currently     Social History     Tobacco Use   Smoking Status Former    Current packs/day: 0.00    Types: Cigarettes    Quit date:     Years since quittin.1   Smokeless Tobacco Never     Family History   Problem Relation Age of Onset    Diabetes Mother     Cancer Mother         lung cacer    Kidney disease Mother     Osteoarthritis Mother     COPD Mother     Blindness Mother     Glaucoma Mother     Macular degeneration Mother     Kidney disease Father     Heart disease Father     Cancer Father         bladder cancer       Meds/Allergies       Current Outpatient Medications:     albuterol (PROVENTIL HFA,VENTOLIN HFA) 90 mcg/act inhaler    Ascorbic Acid (JC-C PO)    aspirin 81 MG tablet    cetirizine (ZyrTEC) 10 mg tablet    Cholecalciferol (VITAMIN D) 125 MCG (5000 UT) CAPS    Cyanocobalamin 1000 MCG/ML KIT    diclofenac sodium (VOLTAREN) 1 %    famotidine (PEPCID) 40 MG tablet    fluticasone (FLONASE) 50 mcg/act nasal spray    folic acid (FOLVITE) 1 mg tablet    gabapentin (NEURONTIN) 600 MG tablet    hydrochlorothiazide (HYDRODIURIL) 25 mg tablet    Influenza Vac A&B SA Adj Quad (Fluad Quadrivalent) 0.5 ML PRSY    losartan (COZAAR) 100 MG tablet    Magnesium 300 MG CAPS    mometasone (ELOCON) 0.1 % lotion    montelukast (SINGULAIR) 10 mg tablet    potassium chloride (K-DUR,KLOR-CON) 10 mEq tablet    promethazine-dextromethorphan (PHENERGAN-DM) 6.25-15 mg/5 mL oral syrup    rOPINIRole (REQUIP) 0.25 mg tablet    timolol (TIMOPTIC) 0.5 % ophthalmic solution    Zoster Vac Recomb Adjuvanted 50 MCG/0.5ML " "SUSR    apixaban (Eliquis) 2.5 mg    benzonatate (TESSALON) 200 MG capsule    clonazePAM (KlonoPIN) 0.5 mg tablet    phenazopyridine (PYRIDIUM) 200 mg tablet    tetanus-diphtheria-acellular pertussis (BOOSTRIX) injection    Allergies   Allergen Reactions    Shellfish-Derived Products - Food Allergy Anaphylaxis     Other reaction(s): Unknown    Wound Dressing Adhesive Rash     Other reaction(s): rash  Other reaction(s): rash    Medical Tape     Clarithromycin Rash           Objective     Blood pressure 115/71, pulse 65, height 5' 7\" (1.702 m), weight 107 kg (235 lb), SpO2 96%. Body mass index is 36.81 kg/m².      PHYSICAL EXAM:      General Appearance:   Alert, cooperative, no distress   HEENT:   Normocephalic, atraumatic, anicteric.     Neck:  Supple, symmetrical, trachea midline   Lungs:   Clear to auscultation bilaterally; no rales, rhonchi or wheezing; respirations unlabored    Heart::   Regular rate and rhythm; no murmur, rub, or gallop.   Abdomen:   Soft, non-tender, non-distended; normal bowel sounds; no masses, no organomegaly    Genitalia:   Deferred    Rectal:   Deferred    Extremities:  No cyanosis, clubbing or edema    Pulses:  2+ and symmetric    Skin:  No jaundice, rashes, or lesions    Lymph nodes:  No palpable cervical lymphadenopathy        Lab Results:   No visits with results within 1 Day(s) from this visit.   Latest known visit with results is:   Lab on 01/31/2024   Component Date Value    WBC 01/31/2024 4.59     RBC 01/31/2024 4.45     Hemoglobin 01/31/2024 12.4     Hematocrit 01/31/2024 39.8     MCV 01/31/2024 89     MCH 01/31/2024 27.9     MCHC 01/31/2024 31.2 (L)     RDW 01/31/2024 14.8     MPV 01/31/2024 12.0     Platelets 01/31/2024 270     nRBC 01/31/2024 0     Neutrophils Relative 01/31/2024 58     Immat GRANS % 01/31/2024 0     Lymphocytes Relative 01/31/2024 31     Monocytes Relative 01/31/2024 7     Eosinophils Relative 01/31/2024 4     Basophils Relative 01/31/2024 0     Neutrophils " Absolute 01/31/2024 2.67     Immature Grans Absolute 01/31/2024 0.01     Lymphocytes Absolute 01/31/2024 1.40     Monocytes Absolute 01/31/2024 0.32     Eosinophils Absolute 01/31/2024 0.17     Basophils Absolute 01/31/2024 0.02     Sodium 01/31/2024 138     Potassium 01/31/2024 4.1     Chloride 01/31/2024 102     CO2 01/31/2024 26     ANION GAP 01/31/2024 10     BUN 01/31/2024 18     Creatinine 01/31/2024 0.86     Glucose, Fasting 01/31/2024 88     Calcium 01/31/2024 10.1     AST 01/31/2024 13     ALT 01/31/2024 14     Alkaline Phosphatase 01/31/2024 94     Total Protein 01/31/2024 7.0     Albumin 01/31/2024 4.2     Total Bilirubin 01/31/2024 0.95     eGFR 01/31/2024 69     Folate 01/31/2024 >22.3     Vit D, 25-Hydroxy 01/31/2024 42.1     Vitamin B-12 01/31/2024 172 (L)     Vitamin B1, Whole Blood 01/31/2024 131.3     Vitamin A 01/31/2024 38.0     PTH 01/31/2024 61.3     Vitamin E(Alpha Tocopher* 01/31/2024 10.9     Vitamin E(Gamma Tocopher* 01/31/2024 1.1     Vitamin K 01/31/2024 0.28     Iron Saturation 01/31/2024 26     TIBC 01/31/2024 476 (H)     Iron 01/31/2024 124     UIBC 01/31/2024 352     Ferritin 01/31/2024 15          Radiology Results:   FL UPPER GI UGI    Result Date: 1/25/2024  Narrative: UPPER GI SERIES  DOUBLE CONTRAST INDICATION:   R63.5: Abnormal weight gain Z98.84: Bariatric surgery status. COMPARISON: Upper GI fluoroscopy 11/30/2020. IMAGES: 463 FLUOROSCOPY TIME:  1.4 minutes TECHNIQUE:  The patient was given effervescent granules and barium  by mouth and images of the esophagus, stomach, and proximal small bowel were obtained. FINDINGS:  images demonstrate surgical clips overlying the right upper quadrant, lumbar fusion hardware. The esophagus is normal in caliber.  Esophageal motility mildly diminished and there is stasis of contrast into the upper esophagus no evidence of mucosal mass, ulceration or fold thickening. Small hiatal hernia observed in recumbent positioning, unchanged.  Postoperative changes of Nakita-en-Y gastric bypass. There appears to be slightly increased size of the gastric pouch since prior study. Contrast flows freely from the gastric pouch into the alimentary limb. No opacification of the excluded stomach to suggest gastro-gastric fistula. Normal appearance of the proximal small bowel. No opacification of the biliopancreatic limb.     Impression: 1.  Postsurgical changes of Nakita-en-Y gastric bypass, with slightly increased size of the gastric pouch from prior exam. No evidence of gastrogastric fistula. 2.  Small hiatal hernia similar to prior. 3.  Mildly diminished esophageal motility. Workstation performed: [id number]     CT lung nodule follow-up    Result Date: 1/19/2024  Narrative: CT Lung Cancer Screening History: Smoking history Comparison: 8/11/2023, 2/14/2023 Technique: Low Dose CT of the chest was performed without intravenous contrast. Findings: Lungs: No suspicious mass or nodule. At the right lung apex posteromedially on image 54 is a stable 4 mm groundglass nodule. Pleura: Unremarkable. Airways: Unremarkable. Mediastinum/Lymph nodes: No enlarged adenopathy seen within the limitations of this unenhanced examination. Cardiovascular: No pericardial effusion. No CT detectable coronary calcifications. Stable dilatation of the ascending thoracic aorta is seen measuring up to 4.4 cm without atherosclerotic calcifications. Thyroid/chest wall: Unremarkable. Upper abdomen: Similar small hiatal hernia. Evidence of bariatric surgery. Status post cholecystectomy. Bones: No acute osseous abnormality or aggressive lesion.    Impression: Impression: No suspicious mass or nodule. Degree of coronary artery calcification: None. Degree of thoracic aortic calcification: None. Lung-RADS Category 2 - Benign. Continue annual screening with low dose CT in 12 months. Workstation:[id number]    CT spine lumbar wo contrast    Result Date: 1/17/2024  Narrative: History: Low back pain,  symptoms persist with > 6 wks treatment. Exam: CT lumbar spine without contrast Procedure: CT scan of the lumbar spine was performed with thin section axial images followed by sagittal and coronal reformations. Comparison: 1/31/2022 Findings:   Postoperative changes of posterior L4-S1 transpedicular fusion with screws extending through the L4 and S1 pedicles. There are also L4-L5 and L5-S1 laminectomies with intervertebral disc spacers with partial maturation of the graft. There are also L5-S1 laminectomies. No acute fracture or dislocation. Grade 1 anterolisthesis of L3 on L4. L1-L2: Circumferential disc bulge results in mild left without right foraminal narrowing. No spinal canal stenosis. L2-L3: Circumferential disc bulge, ligamentum flavum thickening and facet arthropathy results in mild spinal canal stenosis with mild bilateral foraminal narrowing. L3-L4: Unchanged pseudodisc bulge with facet arthropathy results in mild right greater than left foraminal narrowing with mild spinal canal stenosis. L4-L5: Hemilaminectomy with posterior fusion and residual disc which results in mild right without left foraminal narrowing. L5-S1: Postoperative changes of laminectomy. Disc osteophyte complex and facet arthropathy results in moderate bilateral foraminal narrowing with mild spinal canal stenosis. Small hiatal hernia.    Impression: Impression: 1. Postoperative changes of L4-S1 fusion with L4-L5 and L5-S1 laminectomies and improved spinal canal stenosis or foraminal narrowing. 2. Unchanged disc osteophyte complex and facet arthropathy at L5-S1 results in moderate bilateral foraminal narrowing. Workstation:[id number]    Answers submitted by the patient for this visit:  Abdominal Pain Questionnaire (Submitted on 2/8/2024)  Chief Complaint: Abdominal pain  Chronicity: recurrent  Onset: more than 1 year ago  Onset quality: undetermined  Frequency: intermittently  Progression since onset: unchanged  Pain location: RUQ,  epigastric region, periumbilical region  Pain - numeric: 6/10  Pain quality: aching, cramping, sharp  anorexia: No  arthralgias: Yes  belching: No  constipation: Yes  diarrhea: Yes  dysuria: No  fever: No  flatus: No  frequency: No  headaches: No  hematochezia: No  hematuria: No  melena: No  myalgias: No  nausea: No  weight loss: No  vomiting: No  Aggravated by: movement  Relieved by: bowel movements, palpation  Diagnostic workup: CT scan, upper endoscopy

## 2024-02-09 NOTE — PATIENT INSTRUCTIONS
Scheduled date of EGD(as of today):2/27/24  Physician performing EGD:Devin  Location of EGD:[place]  Instructions reviewed with patient by:Ki rubin  Clearances:  none

## 2024-02-10 LAB — ZINC SERPL-MCNC: 79 UG/DL (ref 44–115)

## 2024-02-12 ENCOUNTER — OFFICE VISIT (OUTPATIENT)
Dept: PHYSICAL THERAPY | Age: 69
End: 2024-02-12
Payer: MEDICARE

## 2024-02-12 DIAGNOSIS — Z96.651 STATUS POST TOTAL RIGHT KNEE REPLACEMENT: ICD-10-CM

## 2024-02-12 DIAGNOSIS — M25.561 ACUTE PAIN OF RIGHT KNEE: ICD-10-CM

## 2024-02-12 DIAGNOSIS — M17.11 PRIMARY OSTEOARTHRITIS OF RIGHT KNEE: Primary | ICD-10-CM

## 2024-02-12 PROCEDURE — 97110 THERAPEUTIC EXERCISES: CPT

## 2024-02-12 PROCEDURE — 97140 MANUAL THERAPY 1/> REGIONS: CPT

## 2024-02-12 NOTE — PROGRESS NOTES
"Daily Note     Today's date: 2024  Patient name: Paulina Sidhu  : 1955  MRN: [id number]  Referring provider: Quincy Lopez MD  Dx:   Encounter Diagnosis     ICD-10-CM    1. Primary osteoarthritis of right knee  M17.11       2. Acute pain of right knee  M25.561       3. Status post total right knee replacement  Z96.651           Start Time: 1000  Stop Time: 1055  Total time in clinic (min): 55 minutes    Subjective: Reports doing more sitting this weekend. Her knee is stiff and a little sore. States her knee clicks a lot, but does not increase pain.       Objective: See treatment diary below      Assessment: Better tolerance to PROM today ,continues with some discomfort into ext but tolerates mod OP. Cues for carryover of program and to ensure proper dosage is completed. Patient would benefit from continued PT      Plan: Continue per plan of care.      POC expires: 3/25/24  Date 11/16 12/18 12/21 12/29 1/2 1/5 1/8 1/15 1/18 1/25   Visit count 1 2 3 4 5 6 7 8 9 10   Re-eval  SG        SG   FOTO           POC expires: 3/25/24  Date          Visit count 11 12 13 14         Re-eval             FOTO                    Precautions: fusion L4-S1 2022, HTN, IBS, LBP, B RTC repair    Daily Treatment diary:    Manuals 1/29 2/1 2/5 2/12  1/5 1/8 1/15 1/18 1/25   R knee/ hamstring 5' 5'  5'  5'  8' 5' 5'  5 5'                                          Neuro Re-Ed                                                                                                        Ther Ex             HEP             Hip abd- cybex 30# 30x 30# 30x  30# 30x  30# 30x          Hip adduction 30x ball Ball 30x  30# 30x  30# 30x   30# 30x 30# 30x Ball 30x  30x 30x   Hip abduction 30x D/C     30# 30x 30# 30x BTB 30x  30x 30x   SLR R 3x10 3x10  3x10  3x10   3x10 3x10 3x10 3x10 3x10   QS   5\"x20  5\"x20          R SAQ 1# 30x 1# 30x  1# 30x  2# 30x   2# 30x 3# 30x 3# 10x, 0# 2x10 0# 30x 1# 30x   R LAQ 1# " "30x 1# 30x  1# 30x  2# 30x   2# 30x 0# 30x 0# 30x  0# 30x 1# 30x                Slant board 4x L3 L3 30\"x4  L3 30\"x4  L3 30\"x4   L3 30\" 4x L2 30\" 4x L3 30\"x4 4x 4x   Nustep for strength 10' L5 L5 10'  L5 10'  L5 10'   L6 10' L5 10' L5 10'  10' L5 10' L5                Standing hip abd 1# 30x 1# 30x 1# 30x 2# 30x   2# 30x 0# 0# 3x10 0# 30x 1# 30x   Mini squats nt 27x  30x 20x   30x 15x 15x  15x 30x   TKE ball on wall   20x  20x  20x                                    Ther Activity             Step ups 30x 6\" 30x  6\" 30x  6\" 30x   6\" 30x 30x NV hold 30x   Lat step down      6\" 10x nt NV  hold nt   Gait Training                                       Modalities             Ice after R knee 10' 10' 10' NP  10' 10' 10'  10' 10'                                    "

## 2024-02-16 ENCOUNTER — OFFICE VISIT (OUTPATIENT)
Dept: PHYSICAL THERAPY | Age: 69
End: 2024-02-16
Payer: MEDICARE

## 2024-02-16 DIAGNOSIS — M17.11 PRIMARY OSTEOARTHRITIS OF RIGHT KNEE: Primary | ICD-10-CM

## 2024-02-16 DIAGNOSIS — Z96.651 STATUS POST TOTAL RIGHT KNEE REPLACEMENT: ICD-10-CM

## 2024-02-16 DIAGNOSIS — M25.561 ACUTE PAIN OF RIGHT KNEE: ICD-10-CM

## 2024-02-16 PROCEDURE — 97140 MANUAL THERAPY 1/> REGIONS: CPT | Performed by: PHYSICAL THERAPIST

## 2024-02-16 PROCEDURE — 97110 THERAPEUTIC EXERCISES: CPT | Performed by: PHYSICAL THERAPIST

## 2024-02-16 NOTE — PROGRESS NOTES
"Daily Note     Today's date: 2024  Patient name: Paulina Sidhu  : 1955  MRN: [id number]  Referring provider: Quincy Lopez MD  Dx:   Encounter Diagnosis     ICD-10-CM    1. Primary osteoarthritis of right knee  M17.11       2. Acute pain of right knee  M25.561       3. Status post total right knee replacement  Z96.651           Start Time: 1000  Stop Time: 1055  Total time in clinic (min): 55 minutes    Subjective: Patient reports she saw MD and advised another month of PT. Still swelling as the day goes by but not as warm.       Objective: See treatment diary below      Assessment: Tolerated treatment well. Patient would benefit from continued PT. Good ROM in right knee, swelling decreasing in right knee. Slight warmth to palpate. LB restricts her ability to do some exercises. Modified technique as needed.       Plan: Continue per plan of care.      POC expires: 3/25/24  Date 11/16 12/18 12/21 12/29 1/2 1/5 1/8 1/15 1/18 1/25   Visit count 1 2 3 4 5 6 7 8 9 10   Re-eval  SG        SG   FOTO  64     52/         POC expires: 3/25/24  Date         Visit count 11 12 13 14 15        Re-eval             FOTO                    Precautions: fusion L4-S1 2022, HTN, IBS, LBP, B RTC repair    Daily Treatment diary:    Manuals 1/29 2/1 2/5 2/12 2/16   1/15 1/18 1/25   R knee/ hamstring 5' 5'  5'  5' 5'   5'  5 5'                                          Neuro Re-Ed                                                                                                        Ther Ex             HEP             Hip abd- cybex 30# 30x 30# 30x  30# 30x  30# 30x  30# 30x        Hip adduction 30x ball Ball 30x  30# 30x  30# 30x  30# 30x   Ball 30x  30x 30x   Hip abduction 30x D/C       BTB 30x  30x 30x   SLR R 3x10 3x10  3x10  3x10  3x10 1#  3x10 3x10 3x10   QS   5\"x20  5\"x20          R SAQ 1# 30x 1# 30x  1# 30x  2# 30x  2# 30x   3# 10x, 0# 2x10 0# 30x 1# 30x   R LAQ 1# 30x 1# 30x  1# 30x  2# " "30x  2# 30x   0# 30x  0# 30x 1# 30x                Slant board 4x L3 L3 30\"x4  L3 30\"x4  L3 30\"x4  4x   L3 30\"x4 4x 4x   Nustep for strength 10' L5 L5 10'  L5 10'  L5 10'  L5 10'   L5 10'  10' L5 10' L5                Standing hip abd 1# 30x 1# 30x 1# 30x 2# 30x  2# 30x   0# 3x10 0# 30x 1# 30x   Mini squats nt 27x  30x 20x  STS 20x   15x  15x 30x   TKE ball on wall   20x  20x  20x  20x                                  Ther Activity             Step ups 30x 6\" 30x  6\" 30x  6\" 30x  6\" 30x   NV hold 30x   Lat step down        NV  hold nt   Gait Training                                       Modalities             Ice after R knee 10' 10' 10' NP Home.  10' 10' 10'  10' 10'                                      "

## 2024-02-19 ENCOUNTER — OFFICE VISIT (OUTPATIENT)
Dept: PHYSICAL THERAPY | Age: 69
End: 2024-02-19
Payer: MEDICARE

## 2024-02-19 DIAGNOSIS — Z96.651 STATUS POST TOTAL RIGHT KNEE REPLACEMENT: ICD-10-CM

## 2024-02-19 DIAGNOSIS — M25.561 ACUTE PAIN OF RIGHT KNEE: ICD-10-CM

## 2024-02-19 DIAGNOSIS — M17.11 PRIMARY OSTEOARTHRITIS OF RIGHT KNEE: Primary | ICD-10-CM

## 2024-02-19 PROCEDURE — 97110 THERAPEUTIC EXERCISES: CPT

## 2024-02-19 NOTE — PROGRESS NOTES
"Daily Note     Today's date: 2024  Patient name: Paulina Sidhu  : 1955  MRN: [id number]  Referring provider: Quincy Lopez MD  Dx:   Encounter Diagnosis     ICD-10-CM    1. Primary osteoarthritis of right knee  M17.11       2. Acute pain of right knee  M25.561       3. Status post total right knee replacement  Z96.651                      Subjective: pt notes R knee is stiff after sitting and she continues w/ pain. Difficulty w/ sit to stand, getting out of a chair due to pain. She notes some days she can go up steps one at a time, step over step and some days she has to go up side ways.       Objective: See treatment diary below      Assessment: Tolerated treatment well. Pt able to complete all ex's as per flowsheet but STS is very difficult for pt. Pt needs a rest and uses a lot of UE strength. Poor placement for LE's during squats. Cueing needed to improve. Patient would benefit from continued PT      Plan: Continue per plan of care.      POC expires: 3/25/24  Date 11/16 12/18 12/21 12/29 1/2 1/5 1/8 1/15 1/18 1/25   Visit count 1 2 3 4 5 6 7 8 9 10   Re-eval  SG        SG   FOTO           POC expires: 3/25/24  Date        Visit count 11 12 13 14 15 16       Re-eval             FOTO                    Precautions: fusion L4-S1 2022, HTN, IBS, LBP, B RTC repair    Daily Treatment diary:    Manuals 1/29 2/1 2/5 2/12 2/16 2/19  1/15 1/18 1/25   R knee/ hamstring 5' 5'  5'  5' 5' 5'  5'  5 5'                                          Neuro Re-Ed                                                                                                        Ther Ex             HEP             Hip abd- cybex 30# 30x 30# 30x  30# 30x  30# 30x  30# 30x 30# x30       Hip adduction 30x ball Ball 30x  30# 30x  30# 30x  30# 30x 30# x30  Ball 30x  30x 30x   Hip abduction 30x D/C       BTB 30x  30x 30x   SLR R 3x10 3x10  3x10  3x10  3x10 1# 3x10   3x10 3x10 3x10   QS   5\"x20  " "5\"x20   5\"x20       R SAQ 1# 30x 1# 30x  1# 30x  2# 30x  2# 30x 2# 30x   3# 10x, 0# 2x10 0# 30x 1# 30x   R LAQ 1# 30x 1# 30x  1# 30x  2# 30x  2# 30x 2# 30x   0# 30x  0# 30x 1# 30x                Slant board 4x L3 L3 30\"x4  L3 30\"x4  L3 30\"x4  4x x4  L3 30\"x4 4x 4x   Nustep for strength 10' L5 L5 10'  L5 10'  L5 10'  L5 10' L5 x10'  L5 10'  10' L5 10' L5                Standing hip abd 1# 30x 1# 30x 1# 30x 2# 30x  2# 30x 2# 30x   0# 3x10 0# 30x 1# 30x   Mini squats nt 27x  30x 20x  STS 20x STS x20  15x  15x 30x   TKE ball on wall   20x  20x  20x  20x x20                                 Ther Activity             Step ups 30x 6\" 30x  6\" 30x  6\" 30x  6\" 30x 6\" 30x   NV hold 30x   Lat step down        NV  hold nt   Gait Training                                       Modalities             Ice after R knee 10' 10' 10' NP Home.  10' 10' 10'  10' 10'                                        "

## 2024-02-20 ENCOUNTER — TELEPHONE (OUTPATIENT)
Age: 69
End: 2024-02-20

## 2024-02-20 NOTE — TELEPHONE ENCOUNTER
Patients GI provider:  Dr. Beltran    Number to return call: [phone]    Reason for call: Pooja was calling in to say that the EGD was approved and the prior auth is  [id number]    Scheduled procedure/appointment date if applicable: procedure 2/27/24

## 2024-02-23 ENCOUNTER — APPOINTMENT (OUTPATIENT)
Dept: PHYSICAL THERAPY | Age: 69
End: 2024-02-23
Payer: MEDICARE

## 2024-02-26 ENCOUNTER — OFFICE VISIT (OUTPATIENT)
Dept: PHYSICAL THERAPY | Age: 69
End: 2024-02-26
Payer: MEDICARE

## 2024-02-26 DIAGNOSIS — M25.561 ACUTE PAIN OF RIGHT KNEE: ICD-10-CM

## 2024-02-26 DIAGNOSIS — M17.11 PRIMARY OSTEOARTHRITIS OF RIGHT KNEE: Primary | ICD-10-CM

## 2024-02-26 DIAGNOSIS — Z96.651 STATUS POST TOTAL RIGHT KNEE REPLACEMENT: ICD-10-CM

## 2024-02-26 PROCEDURE — 97110 THERAPEUTIC EXERCISES: CPT

## 2024-02-26 NOTE — PROGRESS NOTES
"Daily Note     Today's date: 2024  Patient name: Paulina Sidhu  : 1955  MRN: [id number]  Referring provider: Quincy Lopez MD  Dx:   Encounter Diagnosis     ICD-10-CM    1. Primary osteoarthritis of right knee  M17.11       2. Acute pain of right knee  M25.561       3. Status post total right knee replacement  Z96.651           Start Time: 1600  Stop Time: 1656  Total time in clinic (min): 56 minutes    Subjective: Everything hurts today       Objective: See treatment diary below      Assessment: Cues for carryover of program and proper dosage. Cues and demonstrations for STS for proper weight shifting and hip hinging to avoid UE use on armrests. Patient was able to complete program despite elevated pain levels upon arrival. Patient would benefit from continued PT.      Plan: Continue per plan of care.      POC expires: 3/25/24  Date 11/16 12/18 12/21 12/29 1/2 1/5 1/8 1/15 1/18 1/25   Visit count 1 2 3 4 5 6 7 8 9 10   Re-eval  SG        SG   FOTO           POC expires: 3/25/24  Date        Visit count 11 12 13 14 15 16 17      Re-eval             FOTO                    Precautions: fusion L4-S1 2022, HTN, IBS, LBP, B RTC repair    Daily Treatment diary:    Manuals    R knee/ hamstring 5' 5'  5'  5' 5' 5' 5'   5 5'                                          Neuro Re-Ed                                                                                                        Ther Ex             HEP             Hip abd- cybex 30# 30x 30# 30x  30# 30x  30# 30x  30# 30x 30# x30 30# x30      Hip adduction 30x ball Ball 30x  30# 30x  30# 30x  30# 30x 30# x30 30# x30  30x 30x   Hip abduction 30x D/C        30x 30x   SLR R 3x10 3x10  3x10  3x10  3x10 1# 3x10  1# 3x10   3x10 3x10   QS   5\"x20  5\"x20   5\"x20       R SAQ 1# 30x 1# 30x  1# 30x  2# 30x  2# 30x 2# 30x  3# 30x  0# 30x 1# 30x   R LAQ 1# 30x 1# 30x  1# 30x  2# " "30x  2# 30x 2# 30x  3# 30x   0# 30x 1# 30x                Slant board 4x L3 L3 30\"x4  L3 30\"x4  L3 30\"x4  4x x4 X4   4x 4x   Nustep for strength 10' L5 L5 10'  L5 10'  L5 10'  L5 10' L5 x10' L5 x10'  10' L5 10' L5                Standing hip abd 1# 30x 1# 30x 1# 30x 2# 30x  2# 30x 2# 30x  2# 30x   0# 30x 1# 30x   Mini squats nt 27x  30x 20x  STS 20x STS x20 STS x20  15x 30x   TKE ball on wall   20x  20x  20x  20x x20 X20                                 Ther Activity             Step ups 30x 6\" 30x  6\" 30x  6\" 30x  6\" 30x 6\" 30x  6\" 30x   hold 30x   Lat step down         hold nt   Gait Training                                       Modalities             Ice after R knee 10' 10' 10' NP Home.  10' 10'  10' 10'                                          "

## 2024-02-27 ENCOUNTER — ANESTHESIA (OUTPATIENT)
Dept: GASTROENTEROLOGY | Facility: HOSPITAL | Age: 69
End: 2024-02-27

## 2024-02-27 ENCOUNTER — HOSPITAL ENCOUNTER (OUTPATIENT)
Dept: GASTROENTEROLOGY | Facility: HOSPITAL | Age: 69
Setting detail: OUTPATIENT SURGERY
Discharge: HOME | End: 2024-02-27
Attending: INTERNAL MEDICINE | Admitting: INTERNAL MEDICINE
Payer: MEDICARE

## 2024-02-27 ENCOUNTER — ANESTHESIA EVENT (OUTPATIENT)
Dept: GASTROENTEROLOGY | Facility: HOSPITAL | Age: 69
End: 2024-02-27

## 2024-02-27 VITALS
DIASTOLIC BLOOD PRESSURE: 65 MMHG | TEMPERATURE: 97.2 F | OXYGEN SATURATION: 98 % | HEART RATE: 61 BPM | SYSTOLIC BLOOD PRESSURE: 116 MMHG | HEIGHT: 67 IN | BODY MASS INDEX: 36.4 KG/M2 | RESPIRATION RATE: 18 BRPM | WEIGHT: 231.92 LBS

## 2024-02-27 DIAGNOSIS — R10.11 RIGHT UPPER QUADRANT ABDOMINAL PAIN: ICD-10-CM

## 2024-02-27 DIAGNOSIS — K21.9 GASTROESOPHAGEAL REFLUX DISEASE WITHOUT ESOPHAGITIS: ICD-10-CM

## 2024-02-27 PROBLEM — E66.9 CLASS 2 OBESITY WITH BODY MASS INDEX (BMI) OF 36.0 TO 36.9 IN ADULT: Status: ACTIVE | Noted: 2024-02-09

## 2024-02-27 PROBLEM — Z99.89 CPAP (CONTINUOUS POSITIVE AIRWAY PRESSURE) DEPENDENCE: Status: ACTIVE | Noted: 2024-02-27

## 2024-02-27 PROBLEM — E66.812 CLASS 2 OBESITY WITH BODY MASS INDEX (BMI) OF 36.0 TO 36.9 IN ADULT: Status: ACTIVE | Noted: 2024-02-09

## 2024-02-27 PROCEDURE — 88305 TISSUE EXAM BY PATHOLOGIST: CPT | Performed by: PATHOLOGY

## 2024-02-27 PROCEDURE — 43239 EGD BIOPSY SINGLE/MULTIPLE: CPT | Performed by: INTERNAL MEDICINE

## 2024-02-27 RX ORDER — WHEAT DEXTRIN/ASPARTAME 3 G/6 G
1 POWDER IN PACKET (EA) ORAL DAILY
COMMUNITY

## 2024-02-27 RX ORDER — ELECTROLYTES/DEXTROSE
1 SOLUTION, ORAL ORAL
COMMUNITY

## 2024-02-27 RX ORDER — LIDOCAINE HYDROCHLORIDE 20 MG/ML
INJECTION, SOLUTION EPIDURAL; INFILTRATION; INTRACAUDAL; PERINEURAL AS NEEDED
Status: DISCONTINUED | OUTPATIENT
Start: 2024-02-27 | End: 2024-02-27

## 2024-02-27 RX ORDER — SODIUM CHLORIDE, SODIUM LACTATE, POTASSIUM CHLORIDE, CALCIUM CHLORIDE 600; 310; 30; 20 MG/100ML; MG/100ML; MG/100ML; MG/100ML
INJECTION, SOLUTION INTRAVENOUS CONTINUOUS PRN
Status: DISCONTINUED | OUTPATIENT
Start: 2024-02-27 | End: 2024-02-27

## 2024-02-27 RX ORDER — PROPOFOL 10 MG/ML
INJECTION, EMULSION INTRAVENOUS AS NEEDED
Status: DISCONTINUED | OUTPATIENT
Start: 2024-02-27 | End: 2024-02-27

## 2024-02-27 RX ADMIN — PROPOFOL 20 MG: 10 INJECTION, EMULSION INTRAVENOUS at 11:26

## 2024-02-27 RX ADMIN — PROPOFOL 40 MG: 10 INJECTION, EMULSION INTRAVENOUS at 11:24

## 2024-02-27 RX ADMIN — PROPOFOL 20 MG: 10 INJECTION, EMULSION INTRAVENOUS at 11:28

## 2024-02-27 RX ADMIN — SODIUM CHLORIDE, SODIUM LACTATE, POTASSIUM CHLORIDE, AND CALCIUM CHLORIDE: .6; .31; .03; .02 INJECTION, SOLUTION INTRAVENOUS at 11:13

## 2024-02-27 RX ADMIN — LIDOCAINE HYDROCHLORIDE 100 MG: 20 INJECTION, SOLUTION EPIDURAL; INFILTRATION; INTRACAUDAL; PERINEURAL at 11:22

## 2024-02-27 RX ADMIN — PROPOFOL 130 MG: 10 INJECTION, EMULSION INTRAVENOUS at 11:22

## 2024-02-27 NOTE — H&P
"History and Physical -  [facility]  Paulina Sidhu 68 y.o. female MRN: [id number]      HPI: Paulina Sidhu is a 68 y.o. year old female who presents for evaluation of gastroesophageal reflux disease and right upper quadrant abdominal pain      REVIEW OF SYSTEMS: Per the HPI, and otherwise unremarkable.    Historical Information   Past Medical History:   Diagnosis Date    Aortic aneurysm (HCC)     Arthritis     Colon polyp     CPAP (continuous positive airway pressure) dependence     DVT (deep venous thrombosis) (HCC)     Fibromyalgia, primary     GERD (gastroesophageal reflux disease)     Hypertension     Hypoglycemia     Irritable bowel syndrome     Liver disease     fatty liver    Osteoporosis     Pneumonia     Rotator cuff tear, non-traumatic, right     Sleep apnea     Stenosis of cervical spine     and neck     Past Surgical History:   Procedure Laterality Date    CARPAL TUNNEL RELEASE      CATARACT EXTRACTION, BILATERAL      w/ repair of retina tear    CHOLECYSTECTOMY      COLONOSCOPY      GASTRIC BYPASS      HYSTERECTOMY      KNEE SURGERY      LUNG SURGERY      per patient \"washing out\"    REPLACEMENT TOTAL KNEE Right 2023    SHOULDER SURGERY      SINUS SURGERY      TONSILLECTOMY       Social History   Social History     Substance and Sexual Activity   Alcohol Use Yes    Comment: occasional     Social History     Substance and Sexual Activity   Drug Use Never     Social History     Tobacco Use   Smoking Status Former    Current packs/day: 0.00    Types: Cigarettes    Quit date:     Years since quittin.1   Smokeless Tobacco Never     Family History   Problem Relation Age of Onset    Diabetes Mother     Cancer Mother         lung cacer    Kidney disease Mother     Osteoarthritis Mother     COPD Mother     Blindness Mother     Glaucoma Mother     Macular degeneration Mother     Kidney disease Father     Heart disease Father     Cancer Father         bladder cancer " "      Meds/Allergies     (Not in a hospital admission)      Allergies   Allergen Reactions    Shellfish-Derived Products - Food Allergy Anaphylaxis    Clarithromycin Rash    Medical Tape Itching and Other (See Comments)     Localized redness       Objective     Blood pressure 123/73, pulse 59, temperature (!) 97.2 °F (36.2 °C), temperature source Temporal, resp. rate 20, height 5' 7\" (1.702 m), weight 105 kg (231 lb 14.8 oz), SpO2 100%.      PHYSICAL EXAM    Gen: NAD  CV: RRR  CHEST: Clear  ABD: soft, NT/ND  EXT: no edema      ASSESSMENT/PLAN:  This is a 68 y.o. year old female here for EGD with biopsies, and she is stable and optimized for her procedure.          "

## 2024-02-27 NOTE — ANESTHESIA PREPROCEDURE EVALUATION
"Procedure:  EGD    S/p gastric bypass    Relevant Problems   CARDIO   (+) Essential hypertension      GI/HEPATIC   (+) GERD (gastroesophageal reflux disease)      PULMONARY   (+) ANDRAE (obstructive sleep apnea)      Other   (+) CPAP (continuous positive airway pressure) dependence   (+) Class 2 obesity with body mass index (BMI) of 36.0 to 36.9 in adult        Physical Exam    Airway    Mallampati score: III  TM Distance: <3 FB  Neck ROM: full     Dental   Comment: Invisiline spacers in place, not removeable     Cardiovascular      Pulmonary      Other Findings  post-pubertal.      Anesthesia Plan  ASA Score- 3     Anesthesia Type- IV sedation with anesthesia with ASA Monitors.         Additional Monitors:     Airway Plan:     Comment: Recent labs personally reviewed:  Lab Results       Component                Value               Date                       WBC                      4.59                01/31/2024                 HGB                      12.4                01/31/2024                 PLT                      270                 01/31/2024            Lab Results       Component                Value               Date                       K                        3.6                 02/09/2024                 BUN                      15                  02/09/2024                 CREATININE               1.05                02/09/2024            No results found for: \"PTT\"   Lab Results       Component                Value               Date                       INR                      1.0                 02/09/2024              Blood type     I, Cheyenne Foreman MD, have personally seen and evaluated the patient prior to anesthetic care.  I have reviewed the pre-anesthetic record, medical history, allergies, medications and any other medical records if appropriate to the anesthetic care.  If a CRNA is involved in the case, I have reviewed the CRNA assessment, if present, and agree. Patient consented for IV " Sedation, general anesthesia as back up. Discussed risks of aspiration, IV infiltration, indications for conversion to general anesthesia. All questions and concerns addressed.     .       Plan Factors-Exercise tolerance (METS): >4 METS.    Chart reviewed.   Existing labs reviewed. Patient summary reviewed.    Patient is not a current smoker.  Patient did not smoke on day of surgery.    Obstructive sleep apnea risk education given perioperatively.        Induction- intravenous.    Postoperative Plan-     Informed Consent- Anesthetic plan and risks discussed with patient.  I personally reviewed this patient with the CRNA. Discussed and agreed on the Anesthesia Plan with the CRNA..

## 2024-02-27 NOTE — ANESTHESIA POSTPROCEDURE EVALUATION
Post-Op Assessment Note    CV Status:  Stable  Pain Score: 0    Pain management: adequate    Multimodal analgesia used between 6 hours prior to anesthesia start to PACU discharge    Mental Status:  Sleepy   Hydration Status:  Stable   PONV Controlled:  None   Airway Patency:  Patent  Two or more mitigation strategies used for obstructive sleep apnea   Post Op Vitals Reviewed: Yes    No anethesia notable event occurred.    Staff: CRNA               BP   121/61   Temp   36.5   Pulse  66   Resp   14   SpO2   97

## 2024-02-27 NOTE — DISCHARGE INSTRUCTIONS
Upper Endoscopy   WHAT YOU NEED TO KNOW:   An upper endoscopy is also called an upper gastrointestinal (GI) endoscopy, or an esophagogastroduodenoscopy (EGD). It is a procedure to examine the inside of your esophagus, stomach, and duodenum (first part of the small intestine) with a scope. You may feel bloated, gassy, or have some abdominal discomfort after your procedure. Your throat may be sore for 24 to 36 hours. You may burp or pass gas from air that is still inside your body.         DISCHARGE INSTRUCTIONS:   Seek care immediately if:   You have sudden, severe abdominal pain.     You have problems swallowing.     You have a large amount of black, sticky bowel movements or blood in your bowel movements.     You have sudden trouble breathing.     You feel weak, lightheaded, or faint or your heart beats faster than normal for you.     Contact your healthcare provider if:   You have a fever and chills.      You have nausea or are vomiting.      Your abdomen is bloated or feels full and hard.     You have abdominal pain.   You have black, sticky bowel movements or blood in your bowel movements.  You have not had a bowel movement for 3 days after your procedure.  You have rash or hives.  You have questions or concerns about your procedure.    Activity:   Do not lift, strain, or run for 24 hours after your procedure.     Rest after your procedure. You have been given medicine to relax you. Do not drive or make important decisions until the day after your procedure. Return to your normal activity as directed.     Relieve gas and discomfort from bloating by lying on your right side with a heating pad on your abdomen. You may need to take short walks to help the gas move out. Eat small meals until bloating is relieved.  Follow up with your healthcare provider as directed: Write down your questions so you remember to ask them during your visits.     If you take a “blood thinner”, please review the specific instructions  from your endoscopist about when you should resume it. These can be found in the “Recommendation” and “Your Medication list” sections of this After Visit Summary.

## 2024-02-29 ENCOUNTER — CONSULT (OUTPATIENT)
Dept: BARIATRICS | Facility: CLINIC | Age: 69
End: 2024-02-29
Payer: MEDICARE

## 2024-02-29 VITALS
WEIGHT: 231 LBS | BODY MASS INDEX: 36.26 KG/M2 | HEART RATE: 64 BPM | SYSTOLIC BLOOD PRESSURE: 130 MMHG | HEIGHT: 67 IN | RESPIRATION RATE: 12 BRPM | DIASTOLIC BLOOD PRESSURE: 80 MMHG

## 2024-02-29 DIAGNOSIS — E04.1 THYROID NODULE: ICD-10-CM

## 2024-02-29 DIAGNOSIS — E66.9 CLASS 2 OBESITY WITH BODY MASS INDEX (BMI) OF 36.0 TO 36.9 IN ADULT: Primary | ICD-10-CM

## 2024-02-29 DIAGNOSIS — K91.2 POSTSURGICAL MALABSORPTION: ICD-10-CM

## 2024-02-29 PROCEDURE — 99203 OFFICE O/P NEW LOW 30 MIN: CPT | Performed by: FAMILY MEDICINE

## 2024-02-29 PROCEDURE — 88305 TISSUE EXAM BY PATHOLOGIST: CPT | Performed by: PATHOLOGY

## 2024-02-29 NOTE — PROGRESS NOTES
Assessment/Plan:  Meri was seen today for consult.    Diagnoses and all orders for this visit:    Class 2 obesity with body mass index (BMI) of 36.0 to 36.9 in adult  Patient noted changes to work on until next visit.   Declines GLP-1 for now  Benign thyroid nodule  Not a candidate for Phentermine/Qsymia/Contrave due to cardiac risk and bipolar ds  Postsurgical malabsorption  Advised goal of protein 60g daily     '2022 IMPRESSION:     1. Diffuse heterogeneous thyroid with increased vascularity and multiple nodules   with JOLIE low suspicion pattern. Recommend continue follow-up with ultrasound as   per clinical guidelines.   Calorie goal handouts provided:1000kcal rec 1300kcal daily     Encourage mindful eating, portion control, motivational interview performed to help patient reach goals   Encouraged exercise start 10 min daily goal 150 min weekly    Total time spent: 30 minutes with >50%  face-to-face time spent counseling patient on nonsurgical interventions for the treatment of excess weight.   Discussed the role of weight loss medications.  Counseled patient on diet behavior and exercise modification for weight loss.    Follow up in approximately 4 mo      Subjective:   Chief Complaint   Patient presents with    Consult     Initial visit with Medical Bariatrician. She does have sleep apnea.     Patient here to discuss weight associated problems and nutrition goals  HPI: Paulina Sidhu  is a 68 y.o. female with excess weight/obesity here to pursue weight management.  Patient is pursuing Conservative Program.   Most recent notes and records were reviewed.    Wt Readings from Last 10 Encounters:   02/29/24 105 kg (231 lb)   02/27/24 105 kg (231 lb 14.8 oz)   02/21/24 107 kg (235 lb)   02/09/24 107 kg (235 lb)   01/26/24 109 kg (239 lb 6.4 oz)   01/11/24 109 kg (241 lb 1.6 oz)   10/25/23 110 kg (243 lb)   10/20/23 111 kg (243 lb 12.8 oz)   07/12/23 109 kg (240 lb)   10/24/22 109 kg (240 lb)   s/p open Nakita-En-Y  "Gastric Bypass - DS (multiple surgeries) with Dr. Cruz in [place] in 2000.  Struggling with weight regain. Obtain repeat UGI to r/o GGF.  Initial: 315lbs  Warren: 170lbs    Initial weight:231lbs  In the process for qualifying as a kidney donor for her   Food logging:no  Increased appetite/cravings:always hungry  B:skips  L:skips  D:one meal per day   Has IBS and needs to avoid some vegetables like cabbage  Exercise: no because of knee surgery  Hydration:16 oz coffee and seltzer  Alcohol:rare  Sleep:has a CPAP , cannot sleep in bed only in recliner  Occupation:no      The following portions of the patient's history were reviewed and updated as appropriate: allergies, current medications, past family history, past medical history, past social history, past surgical history, and problem list.      Review of Systems   Constitutional: Negative for activity change. Fatigue  HENT: Negative for trouble swallowing.    Respiratory: Negative for shortness of breath.    Cardiovascular: Negative for chest pain, edema  Gastrointestinal: + for abdominal pain when standing for a long time, nausea and vomiting, acid reflux, constipation/diarrhea, sometimes gets dumping sdr  Psychiatric/Behavioral: Negative for behavioral problems , anxiety or depression    Objective:  /80 (BP Location: Left arm, Patient Position: Sitting, Cuff Size: Large)   Pulse 64   Resp 12   Ht 5' 7\" (1.702 m)   Wt 105 kg (231 lb)   BMI 36.18 kg/m²   Constitutional: Well-developed, well-nourished and Obese Body mass index is 36.18 kg/m²..  HEENT: No conjunctival pallor or jaundice.  Pulmonary: No increased work of breathing or signs of respiratory distress.  CV: well perfused, no edema  GI: Obese. Non-distended   Neuro: Oriented to person, place and time. Normal Speech. Normal gait.  Psych: Normal affect and mood. Normal thought process no delusions   Labs and Imaging  Recent labs and imaging have been personally reviewed.  Lab Results   Component " Value Date    WBC 4.59 01/31/2024    HGB 12.4 01/31/2024    HCT 39.8 01/31/2024    MCV 89 01/31/2024     01/31/2024     Lab Results   Component Value Date    SODIUM 142 02/09/2024    K 3.6 02/09/2024     02/09/2024    CO2 29 02/09/2024    AGAP 12 (H) 02/09/2024    BUN 15 02/09/2024    CREATININE 1.05 02/09/2024    GLUC 96 02/09/2024    GLUF 88 01/31/2024    CALCIUM 10.2 (H) 02/09/2024    AST 15 02/09/2024    ALT 15 02/09/2024    ALKPHOS 93 02/09/2024    TP 6.9 02/09/2024    TBILI 0.9 02/09/2024    EGFR 57 (L) 02/09/2024     Lab Results   Component Value Date    HGBA1C 5.5 01/31/2024     Lab Results   Component Value Date    TSH 2.41 05/26/2021

## 2024-03-01 ENCOUNTER — OFFICE VISIT (OUTPATIENT)
Dept: PHYSICAL THERAPY | Age: 69
End: 2024-03-01
Payer: MEDICARE

## 2024-03-01 DIAGNOSIS — Z96.651 STATUS POST TOTAL RIGHT KNEE REPLACEMENT: ICD-10-CM

## 2024-03-01 DIAGNOSIS — M25.561 ACUTE PAIN OF RIGHT KNEE: ICD-10-CM

## 2024-03-01 DIAGNOSIS — M17.11 PRIMARY OSTEOARTHRITIS OF RIGHT KNEE: Primary | ICD-10-CM

## 2024-03-01 PROCEDURE — 97110 THERAPEUTIC EXERCISES: CPT

## 2024-03-01 NOTE — PROGRESS NOTES
Daily Note     Today's date: 3/1/2024  Patient name: Paulina Sidhu  : 1955  MRN: [id number]  Referring provider: Quincy Lopez MD  Dx:   Encounter Diagnosis     ICD-10-CM    1. Primary osteoarthritis of right knee  M17.11       2. Acute pain of right knee  M25.561       3. Status post total right knee replacement  Z96.651           Start Time: 1025  Stop Time: 1115  Total time in clinic (min): 50 minutes    Subjective: Patient reports throbbing pain on the medial aspect of her knee that worsens at night.       Objective: See treatment diary below      Assessment: Patient tolerated treatment session well. Patient able to complete all TE without incident. Cues required for proper glute engagement and posturing while performing STS with good carryover exhibited. End range manual knee extension continues to be painful. Improved knee flexibility/ROM exhibiited post treatment. Ended session with CP to modulate pain and reduce effects of DOMS. Patient left clinic in good condition and would benefit from continued stretching/strengthening.       Plan: Continue per POC. Increase reps/resistance as tolerated.      POC expires: 3/25/24  Date 11/16 12/18 12/21 12/29 1/2 1/5 1/8 1/15 1/18 1/25   Visit count 1 2 3 4 5 6 7 8 9 10   Re-eval  SG        SG   FOTO           POC expires: 3/25/24  Date 1/29 2/1 2/5 2/12 2/16 2/19 2/26  3/1     Visit count 11 12 13 14 15 16 17 18     Re-eval             FOTO                    Precautions: fusion L4-S1 2022, HTN, IBS, LBP, B RTC repair    Daily Treatment diary:    Manuals 1/29 2/1 2/5 2/12 2/16 2/19 2/26  3/1 1/18 1/25   R knee/ hamstring 5' 5'  5'  5' 5' 5' 5'  5' 5 5'                                          Neuro Re-Ed                                                                                                        Ther Ex             HEP             Hip abd- cybex 30# 30x 30# 30x  30# 30x  30# 30x  30# 30x 30# x30 30# x30 30# x30     Hip adduction  "30x ball Ball 30x  30# 30x  30# 30x  30# 30x 30# x30 30# x30 30# x30 30x 30x   Hip abduction 30x D/C        30x 30x   SLR R 3x10 3x10  3x10  3x10  3x10 1# 3x10  1# 3x10  1# 3x10 3x10 3x10   QS   5\"x20  5\"x20   5\"x20       R SAQ 1# 30x 1# 30x  1# 30x  2# 30x  2# 30x 2# 30x  3# 30x 3# 30x 0# 30x 1# 30x   R LAQ 1# 30x 1# 30x  1# 30x  2# 30x  2# 30x 2# 30x  3# 30x  3# 30x  0# 30x 1# 30x                Slant board 4x L3 L3 30\"x4  L3 30\"x4  L3 30\"x4  4x x4 X4  x4 4x 4x   Nustep for strength 10' L5 L5 10'  L5 10'  L5 10'  L5 10' L5 x10' L5 x10' L5 x10' 10' L5 10' L5                Standing hip abd 1# 30x 1# 30x 1# 30x 2# 30x  2# 30x 2# 30x  2# 30x  2# 30x 0# 30x 1# 30x   Mini squats nt 27x  30x 20x  STS 20x STS x20 STS x20 STS x20 15x 30x   TKE ball on wall   20x  20x  20x  20x x20 X20  x20                               Ther Activity             Step ups 30x 6\" 30x  6\" 30x  6\" 30x  6\" 30x 6\" 30x  6\" 30x  6\" 30x  hold 30x   Lat step down         hold nt   Gait Training                                       Modalities             Ice after R knee 10' 10' 10' NP Home.  10' 10' 10' 10' 10'                                            "

## 2024-03-04 ENCOUNTER — OFFICE VISIT (OUTPATIENT)
Dept: PHYSICAL THERAPY | Age: 69
End: 2024-03-04
Payer: MEDICARE

## 2024-03-04 DIAGNOSIS — Z96.651 STATUS POST TOTAL RIGHT KNEE REPLACEMENT: ICD-10-CM

## 2024-03-04 DIAGNOSIS — M17.11 PRIMARY OSTEOARTHRITIS OF RIGHT KNEE: ICD-10-CM

## 2024-03-04 DIAGNOSIS — M25.561 ACUTE PAIN OF RIGHT KNEE: Primary | ICD-10-CM

## 2024-03-04 PROCEDURE — 97110 THERAPEUTIC EXERCISES: CPT

## 2024-03-04 NOTE — PROGRESS NOTES
Daily Note     Today's date: 3/4/2024  Patient name: Pauilna Sidhu  : 1955  MRN: [id number]  Referring provider: Quincy Lopez MD  Dx:   Encounter Diagnosis     ICD-10-CM    1. Acute pain of right knee  M25.561       2. Primary osteoarthritis of right knee  M17.11       3. Status post total right knee replacement  Z96.651           Start Time: 1633  Stop Time: 1723  Total time in clinic (min): 50 minutes    Subjective: Patient reports feeling very fatigued pre-tx d/t having 5 vile's of blood taken.       Objective: See treatment diary below      Assessment: Patient tolerated treatment session well. Held off on progressing POC 2* subjective reports. Despite patient arriving to PT fatigued, she was able to complete POC with good technique, form and recall. Improved tolerability to PROM compared to previous visits. Discomfort and pain continues to be reported while performing knee ext stretches with OP. Patient would benefit from continued stretching and strengthening to improve level of function.       Plan: Continue per POC. Increase reps/resistance as tolerated.      POC expires: 3/25/24  Date 11/16 12/18 12/21 12/29 1/2 1/5 1/8 1/15 1/18 1/25   Visit count 1 2 3 4 5 6 7 8 9 10   Re-eval  SG        SG   FOTO           POC expires: 3/25/24  Date 1/29 2/1 2/5 2/12 2/16 2/19 2/26  3/1 3/4    Visit count 11 12 13 14 15 16 17 18 19    Re-eval             FOTO                    Precautions: fusion L4-S1 2022, HTN, IBS, LBP, B RTC repair    Daily Treatment diary:    Manuals 1/29 2/1 2/5 2/12 2/16 2/19 2/26  3/1 3/4 1/18 1/25   R knee/ hamstring 5' 5'  5'  5' 5' 5' 5'  5' 5' 5 5'                                             Neuro Re-Ed                                                                                                                Ther Ex              HEP              Hip abd- cybex 30# 30x 30# 30x  30# 30x  30# 30x  30# 30x 30# x30 30# x30 30# x30 30# x30     Hip adduction 30x  "ball Ball 30x  30# 30x  30# 30x  30# 30x 30# x30 30# x30 30# x30 30# x30 30x 30x   Hip abduction 30x D/C         30x 30x   SLR R 3x10 3x10  3x10  3x10  3x10 1# 3x10  1# 3x10  1# 3x10 1# 3x10 3x10 3x10   QS   5\"x20  5\"x20   5\"x20        R SAQ 1# 30x 1# 30x  1# 30x  2# 30x  2# 30x 2# 30x  3# 30x 3# 30x  3# 30x  0# 30x 1# 30x   R LAQ 1# 30x 1# 30x  1# 30x  2# 30x  2# 30x 2# 30x  3# 30x  3# 30x  3# 30x  0# 30x 1# 30x                 Slant board 4x L3 L3 30\"x4  L3 30\"x4  L3 30\"x4  4x x4 X4  x4 x4 4x 4x   Nustep for strength 10' L5 L5 10'  L5 10'  L5 10'  L5 10' L5 x10' L5 x10' L5 x10' L5 x 5' 10' L5 10' L5                 Standing hip abd 1# 30x 1# 30x 1# 30x 2# 30x  2# 30x 2# 30x  2# 30x  2# 30x 2# 30x 0# 30x 1# 30x   Mini squats nt 27x  30x 20x  STS 20x STS x20 STS x20 STS x20 STS x20 15x 30x   TKE ball on wall   20x  20x  20x  20x x20 X20  x20 x20                                 Ther Activity              Step ups 30x 6\" 30x  6\" 30x  6\" 30x  6\" 30x 6\" 30x  6\" 30x  6\" 30x  6\" 20x hold 30x   Lat step down          hold nt   Gait Training                                          Modalities              Ice after R knee 10' 10' 10' NP Home.  10' 10' 10' 10' 10' 10'                                               "

## 2024-03-05 ENCOUNTER — TELEPHONE (OUTPATIENT)
Age: 69
End: 2024-03-05

## 2024-03-05 DIAGNOSIS — K21.9 GASTROESOPHAGEAL REFLUX DISEASE WITHOUT ESOPHAGITIS: Primary | ICD-10-CM

## 2024-03-05 DIAGNOSIS — K58.0 IRRITABLE BOWEL SYNDROME WITH DIARRHEA: ICD-10-CM

## 2024-03-05 NOTE — TELEPHONE ENCOUNTER
Scheduled date of colonoscopy (as of today): 03/26/2024  Physician performing colonoscopy: Dr Beltran  Location of colonoscopy: [facility]  Bowel prep reviewed with patient: 2 day kade   Instructions reviewed with patient by:sent via my chart   Clearances: n/a    Please send Golytely to patients pharmacy thank you

## 2024-03-06 ENCOUNTER — TELEPHONE (OUTPATIENT)
Age: 69
End: 2024-03-06

## 2024-03-06 NOTE — TELEPHONE ENCOUNTER
Patients GI provider:  Dr. Beltran    Number to return call: [phone]     Reason for call: Pt called and scheduled a colonoscopy and stated this needs to go under her [employer] insurance only since this is a clearance for a transplant please  do not process under medicare as per pt request review thank you     Scheduled procedure/appointment date if applicable: procedure 03/26/2024

## 2024-03-08 ENCOUNTER — OFFICE VISIT (OUTPATIENT)
Dept: PHYSICAL THERAPY | Age: 69
End: 2024-03-08
Payer: MEDICARE

## 2024-03-08 DIAGNOSIS — M25.561 ACUTE PAIN OF RIGHT KNEE: Primary | ICD-10-CM

## 2024-03-08 DIAGNOSIS — Z96.651 STATUS POST TOTAL RIGHT KNEE REPLACEMENT: ICD-10-CM

## 2024-03-08 DIAGNOSIS — M17.11 PRIMARY OSTEOARTHRITIS OF RIGHT KNEE: ICD-10-CM

## 2024-03-08 PROCEDURE — 97110 THERAPEUTIC EXERCISES: CPT

## 2024-03-08 NOTE — PROGRESS NOTES
"Daily Note     Today's date: 3/8/2024  Patient name: Paulina Sidhu  : 1955  MRN: [id number]  Referring provider: Quincy Lopez MD  Dx:   Encounter Diagnosis     ICD-10-CM    1. Acute pain of right knee  M25.561       2. Primary osteoarthritis of right knee  M17.11       3. Status post total right knee replacement  Z96.651           Start Time: 1115  Stop Time: 1205  Total time in clinic (min): 50 minutes    Subjective: Reports feeling better today       Objective: See treatment diary below      Assessment: Cues for carryover of program and to ensure she maintains proper form. Rest breaks needed when performing SLR in supine with progression to 2# weight. Appropriately challenged. Patient would benefit from continued PT      Plan: Continue per plan of care.      POC expires: 3/25/24  Date 11/16 12/18 12/21 12/29 1/2 1/5 1/8 1/15 1/18 1/25   Visit count 1 2 3 4 5 6 7 8 9 10   Re-eval  SG        SG   FOTO           POC expires: 3/25/24  Date 1/29 2/1 2/5 2/12 2/16 2/19 2/26  3/1 3/4 3/8   Visit count 11 12 13 14 15 16 17 18 19 20   Re-eval          NV   FOTO          NV          Precautions: fusion L4-S1 2022, HTN, IBS, LBP, B RTC repair    Daily Treatment diary:    Manuals  2/5 2/12 2/16 2/19 2/26  3/1 3/4 3/8    R knee/ hamstring 5' 5'  5'  5' 5' 5' 5'  5' 5' 5'  5'                                             Neuro Re-Ed                                                                                                                Ther Ex              HEP              Hip abd- cybex 30# 30x 30# 30x  30# 30x  30# 30x  30# 30x 30# x30 30# x30 30# x30 30# x30 30# 30x    Hip adduction 30x ball Ball 30x  30# 30x  30# 30x  30# 30x 30# x30 30# x30 30# x30 30# x30 30# 30x 30x   Hip abduction 30x D/C          30x   SLR R 3x10 3x10  3x10  3x10  3x10 1# 3x10  1# 3x10  1# 3x10 1# 3x10 2# 3x10  3x10   QS   5\"x20  5\"x20   5\"x20        R SAQ 1# 30x 1# 30x  1# 30x  2# 30x  2# 30x 2# 30x  3# 30x " "3# 30x  3# 30x  3# 30x  1# 30x   R LAQ 1# 30x 1# 30x  1# 30x  2# 30x  2# 30x 2# 30x  3# 30x  3# 30x  3# 30x  3# 30x  1# 30x                 Slant board 4x L3 L3 30\"x4  L3 30\"x4  L3 30\"x4  4x x4 X4  x4 x4 4x 4x   Nustep for strength 10' L5 L5 10'  L5 10'  L5 10'  L5 10' L5 x10' L5 x10' L5 x10' L5 x 5' L5 10'  10' L5                 Standing hip abd 1# 30x 1# 30x 1# 30x 2# 30x  2# 30x 2# 30x  2# 30x  2# 30x 2# 30x 2# 30x  1# 30x   Mini squats nt 27x  30x 20x  STS 20x STS x20 STS x20 STS x20 STS x20 20x STS  30x   TKE ball on wall   20x  20x  20x  20x x20 X20  x20 x20 NT                                Ther Activity              Step ups 30x 6\" 30x  6\" 30x  6\" 30x  6\" 30x 6\" 30x  6\" 30x  6\" 30x  6\" 20x 6\" 20x 30x   Lat step down           nt   Gait Training                                          Modalities              Ice after R knee 10' 10' 10' NP Home.  10' 10' 10' 10' NT  10'                                                 "

## 2024-03-11 ENCOUNTER — EVALUATION (OUTPATIENT)
Dept: PHYSICAL THERAPY | Age: 69
End: 2024-03-11
Payer: MEDICARE

## 2024-03-11 DIAGNOSIS — M17.11 PRIMARY OSTEOARTHRITIS OF RIGHT KNEE: ICD-10-CM

## 2024-03-11 DIAGNOSIS — Z96.651 STATUS POST TOTAL RIGHT KNEE REPLACEMENT: ICD-10-CM

## 2024-03-11 DIAGNOSIS — M25.561 ACUTE PAIN OF RIGHT KNEE: Primary | ICD-10-CM

## 2024-03-11 PROCEDURE — 97110 THERAPEUTIC EXERCISES: CPT | Performed by: PHYSICAL THERAPIST

## 2024-03-11 PROCEDURE — 97530 THERAPEUTIC ACTIVITIES: CPT | Performed by: PHYSICAL THERAPIST

## 2024-03-11 NOTE — LETTER
2024    Quincy Lopez MD  [address] [facility] [address]    Patient: Paulina Sidhu   YOB: 1955   Date of Visit: 3/11/2024     Encounter Diagnosis     ICD-10-CM    1. Acute pain of right knee  M25.561       2. Primary osteoarthritis of right knee  M17.11       3. Status post total right knee replacement  Z96.651           Dear Dr. Lopez:    Thank you for your recent referral of Paulina Sidhu. Please review the attached evaluation summary from Paulina's recent visit.     Please verify that you agree with the plan of care by signing the attached order.     If you have any questions or concerns, please do not hesitate to call.     I sincerely appreciate the opportunity to share in the care of one of your patients and hope to have another opportunity to work with you in the near future.       Sincerely,    Ashley Rodas, PT      Referring Provider:      I certify that I have read the below Plan of Care and certify the need for these services furnished under this plan of treatment while under my care.                    Quincy Lopez MD  [address] [facility] [address]  Via Fax: [phone]          PT Re-Evaluation     Today's date: 3/11/2024  Patient name: Paulina Sidhu  : 1955  MRN: [id number]  Referring provider: Quincy Lopez MD  Dx:   Encounter Diagnosis     ICD-10-CM    1. Acute pain of right knee  M25.561       2. Primary osteoarthritis of right knee  M17.11       3. Status post total right knee replacement  Z96.651           Start Time: 845  Stop Time: 944  Total time in clinic (min): 59 minutes    Assessment  Assessment details: Paulina Sidhu is a 67 y.o. female who presents with pain, decreased strength, and decreased ROM. Due to these impairments, Patient has difficulty performing a/iadls and recreational activities. Patient's clinical presentation is consistent with their referring diagnosis of right knee pain. Patient  seen for post-op TKR. She is doing well. She is doing better. Patient would benefit from skilled physical therapy to address their aforementioned impairments, improve their level of function and to improve their overall quality of life.  Impairments: abnormal or restricted ROM, activity intolerance, impaired physical strength, lacks appropriate home exercise program, pain with function, poor posture  and poor body mechanics    Symptom irritability: moderateUnderstanding of Dx/Px/POC: good   Prognosis: good    Goals  ST-3 WEEKS post-op  1.  Decrease pain right knee < 2/10 on VAS at its worst. Progressing towards. Ongoing goal.   2.  Increase ROM right knee flexion > 128. Progressing towards. Ongoing goal.   3.  Increase right knee strength > 4+/5. Progressing towards. Ongoing goal.   4. Improve ambulation with least AD with safe technique on level surfaces. Progress made. Ongoing goal.     LT-6 WEEKS  1. Patient to be independent with a/iadls. Ongoing goal.   2. Increase functional activities for leisure and home activities to previous LOF. Ongoing goal.   3. Independent with HEP and/or fitness program. Ongoing goal.   4. Able to perform reciprocal stairs. Progressing towards. Ongoing goal.     Plan  Plan details:     Patient would benefit from: skilled physical therapy  Planned modality interventions: cryotherapy, thermotherapy: hydrocollator packs and unattended electrical stimulation  Planned therapy interventions: activity modification, behavior modification, body mechanics training, flexibility, functional ROM exercises, home exercise program, IADL retraining, joint mobilization, manual therapy, neuromuscular re-education, patient education, postural training, strengthening, stretching, therapeutic activities, therapeutic exercise, balance/weight bearing training and gait training  Frequency: 2-3x week.  Duration in weeks: 6  Plan of Care beginning date: 2023  Plan of Care expiration date:  2024  Treatment plan discussed with: patient        Subjective Evaluation    History of Present Illness  Mechanism of injury: Patient here for pre-op TKR. Scheduled for surgery 23 and having home care for 4 weeks. Will resume OPT week 4.  23 Patient completed home care and did well. She is able to begin OPT. Staples were removed by home care. She has follow up tomorrow with MD. C/o pain in right knee. Stairs are still difficult. She does c/o left knee bucking at times. She states she is suppose to have the left knee replaced at some time also. She also c/o clicking in right knee also.   24: Im not sleeping well right now since the falls. She states she may have to get a spinal stimulator put in. She has TENS unit also putting on right shoulder. She states her knee is clicking more since the falls.   3/11/24; Patient reports she is getting better overall. She still gets sharp pains in right knee at times.           Recurrent probem    Patient Goals  Patient goals for therapy: decreased pain, increased motion, increased strength and return to sport/leisure activities  Patient goal: able to walk with no AD, able to do stairs better.  Pain  Current pain ratin  At best pain ratin  At worst pain ratin  Location: R knee pain  Quality: tight  Aggravating factors: walking, stair climbing and standing  Progression: improved    Social Support  Steps to enter house: yes  2  Stairs in house: yes   Lives in: multiple-level home  Lives with: spouse    Employment status: not working    Diagnostic Tests  X-ray: abnormal  Treatments  Previous treatment: injection treatment and physical therapy        Objective     Static Posture     Head  Forward.    Shoulders  Rounded.    Thoracic Spine  Hyperkyphosis.    Comments  Protruding abdomen moderate    Neurological Testing     Sensation     Knee   Left Knee   Intact: Light touch    Right Knee   Intact: light touch     Active Range of Motion   Left Knee    Flexion: 130 degrees   Extension: 0 degrees     Right Knee   Flexion: 122 degrees with pain  Extension: 0 degrees     Mobility   Patellar Mobility:   Left Knee   Hypomobile: left medial, left lateral, left superior and left inferior    Right Knee   WFL: medial, lateral, superior and inferior    Strength/Myotome Testing     Left Knee   Flexion: 4+  Extension: 4+    Right Knee   Flexion: 4+  Extension: 4+  Quadriceps contraction: good    Swelling     Left Knee Girth Measurement (cm)   Joint line: 44.5 cm  10 cm above joint line: 52 (suprapatella) cm    Right Knee Girth Measurement (cm)   Joint line: 44.3 cm  10 cm above joint line: 54.5 (suprapatella) cm    Ambulation   Weight-Bearing Status   Weight-Bearing Status (Right): weight-bearing as tolerated    Assistive device used: single point cane    Ambulation: Level Surfaces   Ambulation with assistive device: independent  Ambulation without assistive device: independent    Ambulation: Stairs   Pattern: non-reciprocal  Railings: one rail  Pattern: non-reciprocal  Railings: one rail    Observational Gait   Gait: antalgic   Decreased walking speed and stride length.     Functional Assessment        Single Leg Stance   Left: 3 seconds  Right: 3 seconds      Flowsheet Rows      Flowsheet Row Most Recent Value   PT/OT G-Codes    Current Score 61   Projected Score 64               POC expires: 3/25/24  Date 11/16 12/18 12/21 12/29 1/2 1/5 1/8 1/15 1/18 1/25   Visit count 1 2 3 4 5 6 7 8 9 10   Re-eval  SG        SG   FOTO  49/64     52/64         POC expires: 3/25/24  Date 1/29 2/1 2/5 2/12 2/16 2/19 2/26  3/1 3/4 3/8   Visit count 11 12 13 14 15 16 17 18 19 20   Re-eval          NV   FOTO          NV          POC expires: 3/25/24  Date 3/11            Visit count 21            Re-eval SG            FOTO 61/64              Precautions: fusion L4-S1 2/2022, HTN, IBS, LBP, B RTC repair    Daily Treatment diary:    Manuals 1/29 2/1 2/5 2/12 2/16 2/19 2/26  3/1 3/4 3/8 3/11  "  R knee/ hamstring 5' 5'  5'  5' 5' 5' 5'  5' 5' 5'  5'                                             Neuro Re-Ed              Side stepping           Blue TB 2 laps                                                                                       Ther Ex              HEP              Hip abd- cybex 30# 30x 30# 30x  30# 30x  30# 30x  30# 30x 30# x30 30# x30 30# x30 30# x30 30# 30x 35# 30x   Hip adduction 30x ball Ball 30x  30# 30x  30# 30x  30# 30x 30# x30 30# x30 30# x30 30# x30 30# 30x 35# 30x   Hip abduction 30x D/C             SLR R 3x10 3x10  3x10  3x10  3x10 1# 3x10  1# 3x10  1# 3x10 1# 3x10 2# 3x10  2# 30x   QS   5\"x20  5\"x20   5\"x20        R SAQ 1# 30x 1# 30x  1# 30x  2# 30x  2# 30x 2# 30x  3# 30x 3# 30x  3# 30x  3# 30x  3# 30x   R LAQ 1# 30x 1# 30x  1# 30x  2# 30x  2# 30x 2# 30x  3# 30x  3# 30x  3# 30x  3# 30x  3# 30x                 Slant board 4x L3 L3 30\"x4  L3 30\"x4  L3 30\"x4  4x x4 X4  x4 x4 4x 4x   Nustep for strength 10' L5 L5 10'  L5 10'  L5 10'  L5 10' L5 x10' L5 x10' L5 x10' L5 x 5' L5 10'  10' L5                 Standing hip abd 1# 30x 1# 30x 1# 30x 2# 30x  2# 30x 2# 30x  2# 30x  2# 30x 2# 30x 2# 30x  3# 30x   Mini squats nt 27x  30x 20x  STS 20x STS x20 STS x20 STS x20 STS x20 20x STS  30x   TKE ball on wall   20x  20x  20x  20x x20 X20  x20 x20 NT 30x                               Ther Activity              Step ups 30x 6\" 30x  6\" 30x  6\" 30x  6\" 30x 6\" 30x  6\" 30x  6\" 30x  6\" 20x 6\" 20x 8\" 20x   Lat step down              Gait Training                                          Modalities              Ice after R knee 10' 10' 10' NP Home.  10' 10' 10' 10' NT  5'                                          "

## 2024-03-11 NOTE — PROGRESS NOTES
PT Re-Evaluation     Today's date: 3/11/2024  Patient name: Paulina Sidhu  : 1955  MRN: [id number]  Referring provider: Quincy Lopez MD  Dx:   Encounter Diagnosis     ICD-10-CM    1. Acute pain of right knee  M25.561       2. Primary osteoarthritis of right knee  M17.11       3. Status post total right knee replacement  Z96.651           Start Time: 845  Stop Time: 944  Total time in clinic (min): 59 minutes    Assessment  Assessment details: Paulina Sidhu is a 67 y.o. female who presents with pain, decreased strength, and decreased ROM. Due to these impairments, Patient has difficulty performing a/iadls and recreational activities. Patient's clinical presentation is consistent with their referring diagnosis of right knee pain. Patient seen for post-op TKR. She is doing well. She is doing better. Patient would benefit from skilled physical therapy to address their aforementioned impairments, improve their level of function and to improve their overall quality of life.  Impairments: abnormal or restricted ROM, activity intolerance, impaired physical strength, lacks appropriate home exercise program, pain with function, poor posture  and poor body mechanics    Symptom irritability: moderateUnderstanding of Dx/Px/POC: good   Prognosis: good    Goals  ST-3 WEEKS post-op  1.  Decrease pain right knee < 2/10 on VAS at its worst. Progressing towards. Ongoing goal.   2.  Increase ROM right knee flexion > 128. Progressing towards. Ongoing goal.   3.  Increase right knee strength > 4+/5. Progressing towards. Ongoing goal.   4. Improve ambulation with least AD with safe technique on level surfaces. Progress made. Ongoing goal.     LT-6 WEEKS  1. Patient to be independent with a/iadls. Ongoing goal.   2. Increase functional activities for leisure and home activities to previous LOF. Ongoing goal.   3. Independent with HEP and/or fitness program. Ongoing goal.   4. Able to perform reciprocal stairs.  Progressing towards. Ongoing goal.     Plan  Plan details:     Patient would benefit from: skilled physical therapy  Planned modality interventions: cryotherapy, thermotherapy: hydrocollator packs and unattended electrical stimulation  Planned therapy interventions: activity modification, behavior modification, body mechanics training, flexibility, functional ROM exercises, home exercise program, IADL retraining, joint mobilization, manual therapy, neuromuscular re-education, patient education, postural training, strengthening, stretching, therapeutic activities, therapeutic exercise, balance/weight bearing training and gait training  Frequency: 2-3x week.  Duration in weeks: 6  Plan of Care beginning date: 12/18/2023  Plan of Care expiration date: 4/11/2024  Treatment plan discussed with: patient        Subjective Evaluation    History of Present Illness  Mechanism of injury: Patient here for pre-op TKR. Scheduled for surgery 11/20/23 and having home care for 4 weeks. Will resume OPT week 4.  12/18/23 Patient completed home care and did well. She is able to begin OPT. Staples were removed by home care. She has follow up tomorrow with MD. C/o pain in right knee. Stairs are still difficult. She does c/o left knee bucking at times. She states she is suppose to have the left knee replaced at some time also. She also c/o clicking in right knee also.   1/25/24: Im not sleeping well right now since the falls. She states she may have to get a spinal stimulator put in. She has TENS unit also putting on right shoulder. She states her knee is clicking more since the falls.   3/11/24; Patient reports she is getting better overall. She still gets sharp pains in right knee at times.           Recurrent probem    Patient Goals  Patient goals for therapy: decreased pain, increased motion, increased strength and return to sport/leisure activities  Patient goal: able to walk with no AD, able to do stairs better.  Pain  Current pain  ratin  At best pain ratin  At worst pain ratin  Location: R knee pain  Quality: tight  Aggravating factors: walking, stair climbing and standing  Progression: improved    Social Support  Steps to enter house: yes  2  Stairs in house: yes   Lives in: multiple-level home  Lives with: spouse    Employment status: not working    Diagnostic Tests  X-ray: abnormal  Treatments  Previous treatment: injection treatment and physical therapy        Objective     Static Posture     Head  Forward.    Shoulders  Rounded.    Thoracic Spine  Hyperkyphosis.    Comments  Protruding abdomen moderate    Neurological Testing     Sensation     Knee   Left Knee   Intact: Light touch    Right Knee   Intact: light touch     Active Range of Motion   Left Knee   Flexion: 130 degrees   Extension: 0 degrees     Right Knee   Flexion: 122 degrees with pain  Extension: 0 degrees     Mobility   Patellar Mobility:   Left Knee   Hypomobile: left medial, left lateral, left superior and left inferior    Right Knee   WFL: medial, lateral, superior and inferior    Strength/Myotome Testing     Left Knee   Flexion: 4+  Extension: 4+    Right Knee   Flexion: 4+  Extension: 4+  Quadriceps contraction: good    Swelling     Left Knee Girth Measurement (cm)   Joint line: 44.5 cm  10 cm above joint line: 52 (suprapatella) cm    Right Knee Girth Measurement (cm)   Joint line: 44.3 cm  10 cm above joint line: 54.5 (suprapatella) cm    Ambulation   Weight-Bearing Status   Weight-Bearing Status (Right): weight-bearing as tolerated    Assistive device used: single point cane    Ambulation: Level Surfaces   Ambulation with assistive device: independent  Ambulation without assistive device: independent    Ambulation: Stairs   Pattern: non-reciprocal  Railings: one rail  Pattern: non-reciprocal  Railings: one rail    Observational Gait   Gait: antalgic   Decreased walking speed and stride length.     Functional Assessment        Single Leg Stance   Left: 3  "seconds  Right: 3 seconds      Flowsheet Rows      Flowsheet Row Most Recent Value   PT/OT G-Codes    Current Score 61   Projected Score 64               POC expires: 3/25/24  Date 11/16 12/18 12/21 12/29 1/2 1/5 1/8 1/15 1/18 1/25   Visit count 1 2 3 4 5 6 7 8 9 10   Re-eval  SG        SG   FOTO  49/64     52/64         POC expires: 3/25/24  Date 1/29 2/1 2/5 2/12 2/16 2/19 2/26  3/1 3/4 3/8   Visit count 11 12 13 14 15 16 17 18 19 20   Re-eval          NV   FOTO          NV          POC expires: 3/25/24  Date 3/11            Visit count 21            Re-eval SG            FOTO 61/64              Precautions: fusion L4-S1 2/2022, HTN, IBS, LBP, B RTC repair    Daily Treatment diary:    Manuals 1/29 2/1 2/5 2/12 2/16 2/19 2/26  3/1 3/4 3/8 3/11   R knee/ hamstring 5' 5'  5'  5' 5' 5' 5'  5' 5' 5'  5'                                             Neuro Re-Ed              Side stepping           Blue TB 2 laps                                                                                       Ther Ex              HEP              Hip abd- cybex 30# 30x 30# 30x  30# 30x  30# 30x  30# 30x 30# x30 30# x30 30# x30 30# x30 30# 30x 35# 30x   Hip adduction 30x ball Ball 30x  30# 30x  30# 30x  30# 30x 30# x30 30# x30 30# x30 30# x30 30# 30x 35# 30x   Hip abduction 30x D/C             SLR R 3x10 3x10  3x10  3x10  3x10 1# 3x10  1# 3x10  1# 3x10 1# 3x10 2# 3x10  2# 30x   QS   5\"x20  5\"x20   5\"x20        R SAQ 1# 30x 1# 30x  1# 30x  2# 30x  2# 30x 2# 30x  3# 30x 3# 30x  3# 30x  3# 30x  3# 30x   R LAQ 1# 30x 1# 30x  1# 30x  2# 30x  2# 30x 2# 30x  3# 30x  3# 30x  3# 30x  3# 30x  3# 30x                 Slant board 4x L3 L3 30\"x4  L3 30\"x4  L3 30\"x4  4x x4 X4  x4 x4 4x 4x   Nustep for strength 10' L5 L5 10'  L5 10'  L5 10'  L5 10' L5 x10' L5 x10' L5 x10' L5 x 5' L5 10'  10' L5                 Standing hip abd 1# 30x 1# 30x 1# 30x 2# 30x  2# 30x 2# 30x  2# 30x  2# 30x 2# 30x 2# 30x  3# 30x   Mini squats nt 27x  30x 20x  STS 20x STS " "x20 STS x20 STS x20 STS x20 20x STS  30x   TKE ball on wall   20x  20x  20x  20x x20 X20  x20 x20 NT 30x                               Ther Activity              Step ups 30x 6\" 30x  6\" 30x  6\" 30x  6\" 30x 6\" 30x  6\" 30x  6\" 30x  6\" 20x 6\" 20x 8\" 20x   Lat step down              Gait Training                                          Modalities              Ice after R knee 10' 10' 10' NP Home.  10' 10' 10' 10' NT  5'                            "

## 2024-03-15 ENCOUNTER — TELEPHONE (OUTPATIENT)
Dept: GASTROENTEROLOGY | Facility: CLINIC | Age: 69
End: 2024-03-15

## 2024-03-15 ENCOUNTER — OFFICE VISIT (OUTPATIENT)
Dept: PHYSICAL THERAPY | Age: 69
End: 2024-03-15
Payer: MEDICARE

## 2024-03-15 DIAGNOSIS — M25.561 ACUTE PAIN OF RIGHT KNEE: Primary | ICD-10-CM

## 2024-03-15 DIAGNOSIS — M17.11 PRIMARY OSTEOARTHRITIS OF RIGHT KNEE: ICD-10-CM

## 2024-03-15 DIAGNOSIS — Z96.651 STATUS POST TOTAL RIGHT KNEE REPLACEMENT: ICD-10-CM

## 2024-03-15 PROCEDURE — 97110 THERAPEUTIC EXERCISES: CPT

## 2024-03-15 NOTE — TELEPHONE ENCOUNTER
Called patient and explained her Colonoscopy on 3/26 wit Dr Beltran will have to go through her Medicare and [employer] insurances   she has a recall this year due from her 2019 history of polyps     She understood and stated it will be covered then under her Ins

## 2024-03-15 NOTE — PROGRESS NOTES
Daily Note     Today's date: 3/15/2024  Patient name: Paulina Sidhu  : 1955  MRN: [id number]  Referring provider: Quincy Lopez MD  Dx:   Encounter Diagnosis     ICD-10-CM    1. Acute pain of right knee  M25.561       2. Primary osteoarthritis of right knee  M17.11       3. Status post total right knee replacement  Z96.651           Start Time: 1415  Stop Time: 1450  Total time in clinic (min): 35 minutes    Subjective: Patient reports changing from sitting to standing position continues to be her biggest deficit as well as ascending steps in her house. Patient arrived late and therefore had a shortened session today.      Objective: See treatment diary below      Assessment: Tolerated treatment well. Patient did not experience any worsening of symptoms throughout today's session. Though patient displayed compensations during SLR and standing hip abduction exercises, patient was aware when compensations occurred and was able to self correct her body mechanics and foot placement in order to activate the correct muscles. Improvements in symptoms noted after manual stretching at the conclusion of session. Patient exhibited good technique with therapeutic exercises and would benefit from continued PT      Plan: Continue per plan of care.      POC expires: 3/25/24  Date 11/16 12/18 12/21 12/29 1/2 1/5 1/8 1/15 1/18 1/25   Visit count 1 2 3 4 5 6 7 8 9 10   Re-eval  SG        SG   FOTO  49/64     52/64         POC expires: 3/25/24  Date 1/29 2/1 2/5 2/12 2/16 2/19 2/26  3/1 3/4 3/8   Visit count 11 12 13 14 15 16 17 18 19 20   Re-eval          NV   FOTO          NV          POC expires: 3/25/24  Date 3/11 3/15           Visit count 21 22           Re-eval SG            FOTO 61/64              Precautions: fusion L4-S1 2022, HTN, IBS, LBP, B RTC repair    Daily Treatment diary:    Manuals  2/5 2/12 2/16 2/19 2/26  3/1 3/4 3/8 3/11 3/15   R knee/ hamstring 5' 5'  5'  5' 5' 5' 5'  5' 5' 5'  5' 5'       "                                          Neuro Re-Ed               Side stepping           Blue TB 2 laps nt                                                                                             Ther Ex               HEP               Hip abd- cybex 30# 30x 30# 30x  30# 30x  30# 30x  30# 30x 30# x30 30# x30 30# x30 30# x30 30# 30x 35# 30x 35# 30x   Hip adduction 30x ball Ball 30x  30# 30x  30# 30x  30# 30x 30# x30 30# x30 30# x30 30# x30 30# 30x 35# 30x 35# 30x   Hip abduction 30x D/C              SLR R 3x10 3x10  3x10  3x10  3x10 1# 3x10  1# 3x10  1# 3x10 1# 3x10 2# 3x10  2# 30x 2# 30x   QS   5\"x20  5\"x20   5\"x20         R SAQ 1# 30x 1# 30x  1# 30x  2# 30x  2# 30x 2# 30x  3# 30x 3# 30x  3# 30x  3# 30x  3# 30x 3# 30x   R LAQ 1# 30x 1# 30x  1# 30x  2# 30x  2# 30x 2# 30x  3# 30x  3# 30x  3# 30x  3# 30x  3# 30x 3# 30x                  Slant board 4x L3 L3 30\"x4  L3 30\"x4  L3 30\"x4  4x x4 X4  x4 x4 4x 4x 4x   Nustep for strength 10' L5 L5 10'  L5 10'  L5 10'  L5 10' L5 x10' L5 x10' L5 x10' L5 x 5' L5 10'  10' L5 L5 10'                  Standing hip abd 1# 30x 1# 30x 1# 30x 2# 30x  2# 30x 2# 30x  2# 30x  2# 30x 2# 30x 2# 30x  3# 30x 3# 30x   Mini squats nt 27x  30x 20x  STS 20x STS x20 STS x20 STS x20 STS x20 20x STS  30x nt   TKE ball on wall   20x  20x  20x  20x x20 X20  x20 x20 NT 30x 30x                                 Ther Activity               Step ups 30x 6\" 30x  6\" 30x  6\" 30x  6\" 30x 6\" 30x  6\" 30x  6\" 30x  6\" 20x 6\" 20x 8\" 20x 8\" 20x   Lat step down               Gait Training                                             Modalities               Ice after R knee 10' 10' 10' NP Home.  10' 10' 10' 10' NT  5' 5'                                  "

## 2024-03-19 ENCOUNTER — APPOINTMENT (OUTPATIENT)
Dept: PHYSICAL THERAPY | Age: 69
End: 2024-03-19
Payer: MEDICARE

## 2024-03-21 ENCOUNTER — OFFICE VISIT (OUTPATIENT)
Dept: PHYSICAL THERAPY | Age: 69
End: 2024-03-21
Payer: MEDICARE

## 2024-03-21 DIAGNOSIS — Z96.651 STATUS POST TOTAL RIGHT KNEE REPLACEMENT: ICD-10-CM

## 2024-03-21 DIAGNOSIS — M17.11 PRIMARY OSTEOARTHRITIS OF RIGHT KNEE: ICD-10-CM

## 2024-03-21 DIAGNOSIS — M25.561 ACUTE PAIN OF RIGHT KNEE: Primary | ICD-10-CM

## 2024-03-21 PROCEDURE — 97110 THERAPEUTIC EXERCISES: CPT

## 2024-03-21 NOTE — PROGRESS NOTES
Daily Note     Today's date: 3/21/2024  Patient name: Paulina Sidhu  : 1955  MRN: [id number]  Referring provider: Quincy Lopez MD  Dx:   Encounter Diagnosis     ICD-10-CM    1. Acute pain of right knee  M25.561       2. Primary osteoarthritis of right knee  M17.11       3. Status post total right knee replacement  Z96.651           Start Time: 0945  Stop Time: 1030  Total time in clinic (min): 45 minutes    Subjective: States her knee is still weak but overall doing better. States she is able to complete most ADLs except heavier activity but is mostly limited due to her LB and not her knee.       Objective: See treatment diary below      Assessment: Good recall of her program. Patient is independent with her program and will transition to an independent HEP at this time. Some continued stiffness and pain at times t/o session. ROM progressed well with no functional limitation at this time.       Plan: Patient will be discharged by her primary PT.      POC expires: 3/25/24  Date 11/16 12/18 12/21 12/29 1/2 1/5 1/8 1/15 1/18 1/25   Visit count 1 2 3 4 5 6 7 8 9 10   Re-eval  SG        SG   FOTO  49/64     52/64         POC expires: 3/25/24  Date 1/29 2/1 2/5 2/12 2/16 2/19 2/26  3/1 3/4 3/8   Visit count 11 12 13 14 15 16 17 18 19 20   Re-eval          NV   FOTO          NV          POC expires: 3/25/24  Date 3/11 3/15 3/21           Visit count 21 22 23          Re-eval SG            FOTO 61/64  72(64)            Precautions: fusion L4-S1 2022, HTN, IBS, LBP, B RTC repair    Daily Treatment diary:    Manuals 3/21  2/5 2/12 2/16 2/19 2/26  3/1 3/4 3/8 3/11 3/15   R knee/ hamstring 5'  5'  5' 5' 5' 5'  5' 5' 5'  5' 5'                                                Neuro Re-Ed               Side stepping           Blue TB 2 laps nt                                                                                             Ther Ex               HEP               Hip abd- cybex 35# 30x  30# 30x  30# 30x   "30# 30x 30# x30 30# x30 30# x30 30# x30 30# 30x 35# 30x 35# 30x   Hip adduction 35# 30x  30# 30x  30# 30x  30# 30x 30# x30 30# x30 30# x30 30# x30 30# 30x 35# 30x 35# 30x   Hip abduction               SLR R 2# 30x   3x10  3x10  3x10 1# 3x10  1# 3x10  1# 3x10 1# 3x10 2# 3x10  2# 30x 2# 30x   QS   5\"x20  5\"x20   5\"x20         R SAQ 3# 30x   1# 30x  2# 30x  2# 30x 2# 30x  3# 30x 3# 30x  3# 30x  3# 30x  3# 30x 3# 30x   R LAQ 3# 30x   1# 30x  2# 30x  2# 30x 2# 30x  3# 30x  3# 30x  3# 30x  3# 30x  3# 30x 3# 30x                  Slant board 4x   L3 30\"x4  L3 30\"x4  4x x4 X4  x4 x4 4x 4x 4x   Nustep for strength L5 10'   L5 10'  L5 10'  L5 10' L5 x10' L5 x10' L5 x10' L5 x 5' L5 10'  10' L5 L5 10'                  Standing hip abd 3# 30x  1# 30x 2# 30x  2# 30x 2# 30x  2# 30x  2# 30x 2# 30x 2# 30x  3# 30x 3# 30x   Mini squats STS 2x10   30x 20x  STS 20x STS x20 STS x20 STS x20 STS x20 20x STS  30x nt   TKE ball on wall  30x   20x  20x  20x x20 X20  x20 x20 NT 30x 30x                                 Ther Activity               Step ups 8\" 20x  6\" 30x  6\" 30x  6\" 30x 6\" 30x  6\" 30x  6\" 30x  6\" 20x 6\" 20x 8\" 20x 8\" 20x   Lat step down               Gait Training                                             Modalities               Ice after R knee   10' NP Home.  10' 10' 10' 10' NT  5' 5'                                    "

## 2024-03-26 ENCOUNTER — ANESTHESIA (OUTPATIENT)
Dept: GASTROENTEROLOGY | Facility: HOSPITAL | Age: 69
End: 2024-03-26

## 2024-03-26 ENCOUNTER — HOSPITAL ENCOUNTER (OUTPATIENT)
Dept: GASTROENTEROLOGY | Facility: HOSPITAL | Age: 69
Setting detail: OUTPATIENT SURGERY
Discharge: HOME | End: 2024-03-26
Attending: INTERNAL MEDICINE
Payer: MEDICARE

## 2024-03-26 ENCOUNTER — ANESTHESIA EVENT (OUTPATIENT)
Dept: GASTROENTEROLOGY | Facility: HOSPITAL | Age: 69
End: 2024-03-26

## 2024-03-26 VITALS
TEMPERATURE: 97.9 F | HEIGHT: 67 IN | OXYGEN SATURATION: 96 % | DIASTOLIC BLOOD PRESSURE: 89 MMHG | WEIGHT: 229.72 LBS | RESPIRATION RATE: 16 BRPM | SYSTOLIC BLOOD PRESSURE: 134 MMHG | HEART RATE: 62 BPM | BODY MASS INDEX: 36.06 KG/M2

## 2024-03-26 DIAGNOSIS — Z86.010 HISTORY OF COLON POLYPS: ICD-10-CM

## 2024-03-26 PROCEDURE — 88305 TISSUE EXAM BY PATHOLOGIST: CPT | Performed by: PATHOLOGY

## 2024-03-26 RX ORDER — LIDOCAINE HYDROCHLORIDE 10 MG/ML
INJECTION, SOLUTION EPIDURAL; INFILTRATION; INTRACAUDAL; PERINEURAL AS NEEDED
Status: DISCONTINUED | OUTPATIENT
Start: 2024-03-26 | End: 2024-03-26

## 2024-03-26 RX ORDER — SODIUM CHLORIDE, SODIUM LACTATE, POTASSIUM CHLORIDE, CALCIUM CHLORIDE 600; 310; 30; 20 MG/100ML; MG/100ML; MG/100ML; MG/100ML
125 INJECTION, SOLUTION INTRAVENOUS CONTINUOUS
Status: CANCELLED | OUTPATIENT
Start: 2024-03-26

## 2024-03-26 RX ORDER — SODIUM CHLORIDE, SODIUM LACTATE, POTASSIUM CHLORIDE, CALCIUM CHLORIDE 600; 310; 30; 20 MG/100ML; MG/100ML; MG/100ML; MG/100ML
125 INJECTION, SOLUTION INTRAVENOUS CONTINUOUS
Status: DISCONTINUED | OUTPATIENT
Start: 2024-03-26 | End: 2024-03-30 | Stop reason: HOSPADM

## 2024-03-26 RX ORDER — PROPOFOL 10 MG/ML
INJECTION, EMULSION INTRAVENOUS AS NEEDED
Status: DISCONTINUED | OUTPATIENT
Start: 2024-03-26 | End: 2024-03-26

## 2024-03-26 RX ADMIN — PROPOFOL 40 MG: 10 INJECTION, EMULSION INTRAVENOUS at 14:28

## 2024-03-26 RX ADMIN — PROPOFOL 40 MG: 10 INJECTION, EMULSION INTRAVENOUS at 14:46

## 2024-03-26 RX ADMIN — SODIUM CHLORIDE, SODIUM LACTATE, POTASSIUM CHLORIDE, AND CALCIUM CHLORIDE: .6; .31; .03; .02 INJECTION, SOLUTION INTRAVENOUS at 14:00

## 2024-03-26 RX ADMIN — PROPOFOL 40 MG: 10 INJECTION, EMULSION INTRAVENOUS at 14:43

## 2024-03-26 RX ADMIN — PROPOFOL 40 MG: 10 INJECTION, EMULSION INTRAVENOUS at 14:20

## 2024-03-26 RX ADMIN — PROPOFOL 20 MG: 10 INJECTION, EMULSION INTRAVENOUS at 14:32

## 2024-03-26 RX ADMIN — PROPOFOL 40 MG: 10 INJECTION, EMULSION INTRAVENOUS at 14:24

## 2024-03-26 RX ADMIN — PROPOFOL 40 MG: 10 INJECTION, EMULSION INTRAVENOUS at 14:30

## 2024-03-26 RX ADMIN — LIDOCAINE HYDROCHLORIDE 20 MG: 10 INJECTION, SOLUTION EPIDURAL; INFILTRATION; INTRACAUDAL; PERINEURAL at 14:15

## 2024-03-26 RX ADMIN — PROPOFOL 20 MG: 10 INJECTION, EMULSION INTRAVENOUS at 14:22

## 2024-03-26 RX ADMIN — PROPOFOL 20 MG: 10 INJECTION, EMULSION INTRAVENOUS at 14:18

## 2024-03-26 RX ADMIN — PROPOFOL 20 MG: 10 INJECTION, EMULSION INTRAVENOUS at 14:36

## 2024-03-26 RX ADMIN — PROPOFOL 20 MG: 10 INJECTION, EMULSION INTRAVENOUS at 14:40

## 2024-03-26 RX ADMIN — PROPOFOL 20 MG: 10 INJECTION, EMULSION INTRAVENOUS at 14:38

## 2024-03-26 RX ADMIN — PROPOFOL 20 MG: 10 INJECTION, EMULSION INTRAVENOUS at 14:16

## 2024-03-26 RX ADMIN — PROPOFOL 40 MG: 10 INJECTION, EMULSION INTRAVENOUS at 14:26

## 2024-03-26 RX ADMIN — PROPOFOL 100 MG: 10 INJECTION, EMULSION INTRAVENOUS at 14:15

## 2024-03-26 RX ADMIN — PROPOFOL 40 MG: 10 INJECTION, EMULSION INTRAVENOUS at 14:34

## 2024-03-26 NOTE — ANESTHESIA PREPROCEDURE EVALUATION
Procedure:  COLONOSCOPY    Relevant Problems   CARDIO   (+) Essential hypertension      GI/HEPATIC   (+) GERD (gastroesophageal reflux disease)      PULMONARY   (+) ANDRAE (obstructive sleep apnea)      Other   (+) CPAP (continuous positive airway pressure) dependence   (+) Class 2 obesity with body mass index (BMI) of 36.0 to 36.9 in adult        Physical Exam    Airway    Mallampati score: III  TM Distance: >3 FB  Neck ROM: full     Dental   Comment: Denies loose teeth     Cardiovascular  Cardiovascular exam normal    Pulmonary  Pulmonary exam normal     Other Findings  Portions of exam deferred due to low yield and/or unknown COVID statuspost-pubertal.      Anesthesia Plan  ASA Score- 3     Anesthesia Type- IV sedation with anesthesia with ASA Monitors.         Additional Monitors:     Airway Plan:            Plan Factors-Exercise tolerance (METS): >4 METS.    Chart reviewed.   Existing labs reviewed. Patient summary reviewed.    Patient is not a current smoker.              Induction- intravenous.    Postoperative Plan-     Informed Consent- Anesthetic plan and risks discussed with patient.  I personally reviewed this patient with the CRNA. Discussed and agreed on the Anesthesia Plan with the CRNA..

## 2024-03-26 NOTE — H&P
"History and Physical -  [facility]  Paulina Sidhu 68 y.o. female MRN: [id number]      HPI: Paulina Sidhu is a 68 y.o. year old female who presents for personal history of polyps, family history of colon polyps, and family history of colon cancer      REVIEW OF SYSTEMS: Per the HPI, and otherwise unremarkable.    Historical Information   Past Medical History:   Diagnosis Date    Aortic aneurysm (HCC)     Arthritis     Colon polyp     CPAP (continuous positive airway pressure) dependence 2024    DVT (deep venous thrombosis) (HCC)     Fibromyalgia, primary     GERD (gastroesophageal reflux disease)     Hypertension     Hypoglycemia     Irritable bowel syndrome     Liver disease     fatty liver    Osteoporosis     Pneumonia     Rotator cuff tear, non-traumatic, right     Sleep apnea     Stenosis of cervical spine     and neck     Past Surgical History:   Procedure Laterality Date    CARPAL TUNNEL RELEASE      CATARACT EXTRACTION, BILATERAL      w/ repair of retina tear    CHOLECYSTECTOMY      COLONOSCOPY      GASTRIC BYPASS      HYSTERECTOMY      KNEE SURGERY      LUNG SURGERY      per patient \"washing out\"    REPLACEMENT TOTAL KNEE Right 2023    SHOULDER SURGERY      SINUS SURGERY      TONSILLECTOMY       Social History   Social History     Substance and Sexual Activity   Alcohol Use Yes    Comment: occasional     Social History     Substance and Sexual Activity   Drug Use Never     Social History     Tobacco Use   Smoking Status Former    Current packs/day: 0.00    Types: Cigarettes    Quit date:     Years since quittin.2   Smokeless Tobacco Never     Family History   Problem Relation Age of Onset    Diabetes Mother     Cancer Mother         lung cacer    Kidney disease Mother     Osteoarthritis Mother     COPD Mother     Blindness Mother     Glaucoma Mother     Macular degeneration Mother     Kidney disease Father     Heart disease Father     Cancer Father         bladder " "cancer       Meds/Allergies     (Not in a hospital admission)      Allergies   Allergen Reactions    Shellfish-Derived Products - Food Allergy Anaphylaxis    Clarithromycin Rash    Medical Tape Itching and Other (See Comments)     Localized redness       Objective     Blood pressure 150/73, pulse 66, temperature 97.9 °F (36.6 °C), temperature source Temporal, resp. rate 16, height 5' 7\" (1.702 m), weight 104 kg (229 lb 11.5 oz), SpO2 98%.      PHYSICAL EXAM    Gen: NAD  CV: RRR  CHEST: Clear  ABD: soft, NT/ND  EXT: no edema      ASSESSMENT/PLAN:  This is a 68 y.o. year old female here for colonoscopy, and she is stable and optimized for her procedure.          "

## 2024-03-26 NOTE — ANESTHESIA POSTPROCEDURE EVALUATION
Post-Op Assessment Note    CV Status:  Stable  Pain Score: 0    Pain management: adequate       Mental Status:  Arousable and sleepy   Hydration Status:  Euvolemic   PONV Controlled:  Controlled   Airway Patency:  Patent     Post Op Vitals Reviewed: Yes    No anethesia notable event occurred.    Staff: CRNA               BP   125/69   Temp      Pulse 70   Resp 18   SpO2 98% RA

## 2024-03-27 DIAGNOSIS — K21.9 GASTROESOPHAGEAL REFLUX DISEASE WITHOUT ESOPHAGITIS: Primary | ICD-10-CM

## 2024-03-27 RX ORDER — ESOMEPRAZOLE MAGNESIUM 40 MG/1
40 CAPSULE, DELAYED RELEASE ORAL DAILY
Qty: 31 CAPSULE | Refills: 0 | Status: SHIPPED | OUTPATIENT
Start: 2024-03-27

## 2024-03-27 NOTE — TELEPHONE ENCOUNTER
Patient states she had a colonoscopy yesterday and the doctor was supposed to send in this medication. However, she is taking it twice a day not once a day.     Please advise.

## 2024-03-27 NOTE — TELEPHONE ENCOUNTER
Patient states she had a colonoscopy yesterday and the doctor was supposed to send in this medication. However, she is taking it twice a day not once a day.  Please advise.     Reason for call:   [x] Refill   [] Prior Auth  [] Other:     Office:   [] PCP/Provider -   [x] Specialty/Provider - Gastro     Medication: Esomeprazole     Dose/Frequency: 40 mg tablet taken once in the morning and once at night     Quantity: 180    Pharmacy: [facility] #[id number] - [place] [address] - [address] [phone]     Does the patient have enough for 3 days?   [] Yes   [x] No - Send as HP to POD

## 2024-03-28 PROCEDURE — 88305 TISSUE EXAM BY PATHOLOGIST: CPT | Performed by: PATHOLOGY

## 2024-03-29 ENCOUNTER — CLINICAL SUPPORT (OUTPATIENT)
Dept: BARIATRICS | Facility: CLINIC | Age: 69
End: 2024-03-29

## 2024-03-29 ENCOUNTER — TELEPHONE (OUTPATIENT)
Age: 69
End: 2024-03-29

## 2024-03-29 VITALS — HEIGHT: 67 IN | WEIGHT: 227 LBS | BODY MASS INDEX: 35.63 KG/M2

## 2024-03-29 DIAGNOSIS — R63.5 ABNORMAL WEIGHT GAIN: Primary | ICD-10-CM

## 2024-03-29 PROCEDURE — WMREE PR REE WEIGHT MANAGEMENT

## 2024-03-29 PROCEDURE — 99245 OFF/OP CONSLTJ NEW/EST HI 55: CPT

## 2024-03-29 NOTE — PROGRESS NOTES
Reevue indirect calorimter revealed REE is  fast (+27%)  compared to the predictive normal for someone her same age, height, and gender.     Reevue Indirect Calorimeter REE:  2074         Weight loss without exercise:  1660 - 2074         Weight loss with exercise:   +216                   Maintenance:    2074 - 2695

## 2024-03-29 NOTE — TELEPHONE ENCOUNTER
Pt called to say she got locked out of her house and is on her way for her 9:00 appt but will be late.  I did explain that if she is later than 15 min they may require her to reschedule

## 2024-04-19 DIAGNOSIS — K21.9 GASTROESOPHAGEAL REFLUX DISEASE WITHOUT ESOPHAGITIS: Primary | ICD-10-CM

## 2024-04-19 RX ORDER — ESOMEPRAZOLE MAGNESIUM 40 MG/1
40 CAPSULE, DELAYED RELEASE ORAL
Qty: 30 CAPSULE | Refills: 3 | Status: SHIPPED | OUTPATIENT
Start: 2024-04-19

## 2024-04-19 RX ORDER — DICYCLOMINE HCL 20 MG
20 TABLET ORAL EVERY 6 HOURS
Qty: 360 TABLET | Refills: 1 | Status: SHIPPED | OUTPATIENT
Start: 2024-04-19

## 2024-04-19 NOTE — TELEPHONE ENCOUNTER
Received fax from [facility] requesting a new 90 day presription both esomeprazole magnesium 40 mg and Dicyclomine 20 mg    Spoke to pt. She advised that she has been taking these meds for a long time and would like for us to send a new prescription to [facility].    Routing to provider for approval.

## 2024-04-29 DIAGNOSIS — K21.9 GASTROESOPHAGEAL REFLUX DISEASE WITHOUT ESOPHAGITIS: ICD-10-CM

## 2024-04-30 RX ORDER — ESOMEPRAZOLE MAGNESIUM 40 MG/1
CAPSULE, DELAYED RELEASE ORAL
Qty: 180 CAPSULE | Refills: 1 | Status: SHIPPED | OUTPATIENT
Start: 2024-04-30

## 2024-09-05 ENCOUNTER — NURSE TRIAGE (OUTPATIENT)
Age: 69
End: 2024-09-05

## 2024-09-05 NOTE — TELEPHONE ENCOUNTER
"  Last OV: 2/9/24 GERD, IBS-D/C, RUQ pain, obesity  Last Colonoscopy:3/26/24  Last EGD: 2/27/24      Pt. Calling stating since taking Miralax for constipation on Tuesday for 2 days she developed diarrhea, moving bowels 4 times a day loose watery stools, took imodium with no relief, advised to start a fiber supplement and avoid dairy or spicy/high fat foods, eat a bland diet, take Bentyl for abdominal cramping and monitor symptoms, if no improvement to call back , any further recommendations please advise           Reason for Disposition   MILD-MODERATE diarrhea (e.g., 1-6 times / day more than normal)    Answer Assessment - Initial Assessment Questions  1. DIARRHEA SEVERITY: \"How bad is the diarrhea?\" \"How many extra stools have you had in the past 24 hours than normal?\"     - NO DIARRHEA (SCALE 0)    - MILD (SCALE 1-3): Few loose or mushy BMs; increase of 1-3 stools over normal daily number of stools; mild increase in ostomy output.    -  MODERATE (SCALE 4-7): Increase of 4-6 stools daily over normal; moderate increase in ostomy output.  * SEVERE (SCALE 8-10; OR 'WORST POSSIBLE'): Increase of 7 or more stools daily over normal; moderate increase in ostomy output; incontinence.      Moderate   2. ONSET: \"When did the diarrhea begin?\"       Tuesday after using miralax   3. BM CONSISTENCY: \"How loose or watery is the diarrhea?\"       Loose watery   4. VOMITING: \"Are you also vomiting?\" If Yes, ask: \"How many times in the past 24 hours?\"       Denies   5. ABDOMINAL PAIN: \"Are you having any abdominal pain?\" If Yes, ask: \"What does it feel like?\" (e.g., crampy, dull, intermittent, constant)       Cramping   6. ABDOMINAL PAIN SEVERITY: If present, ask: \"How bad is the pain?\"  (e.g., Scale 1-10; mild, moderate, or severe)    - MILD (1-3): doesn't interfere with normal activities, abdomen soft and not tender to touch     - MODERATE (4-7): interferes with normal activities or awakens from sleep, tender to touch     - SEVERE " "(8-10): excruciating pain, doubled over, unable to do any normal activities        Mild   7. ORAL INTAKE: If vomiting, \"Have you been able to drink liquids?\" \"How much fluids have you had in the past 24 hours?\"      Staying hydrated  8. HYDRATION: \"Any signs of dehydration?\" (e.g., dry mouth [not just dry lips], too weak to stand, dizziness, new weight loss) \"When did you last urinate?\"      Denies   9. EXPOSURE: \"Have you traveled to a foreign country recently?\" \"Have you been exposed to anyone with diarrhea?\" \"Could you have eaten any food that was spoiled?\"      Denies   10. ANTIBIOTIC USE: \"Are you taking antibiotics now or have you taken antibiotics in the past 2 months?\"        Denies   11. OTHER SYMPTOMS: \"Do you have any other symptoms?\" (e.g., fever, blood in stool)        Denies   12. PREGNANCY: \"Is there any chance you are pregnant?\" \"When was your last menstrual period?\"        Denies    Protocols used: Diarrhea-ADULT-[place]    "

## 2024-09-12 ENCOUNTER — TELEPHONE (OUTPATIENT)
Dept: GASTROENTEROLOGY | Facility: CLINIC | Age: 69
End: 2024-09-12

## 2024-09-12 DIAGNOSIS — R19.7 DIARRHEA, UNSPECIFIED TYPE: Primary | ICD-10-CM

## 2024-09-12 NOTE — TELEPHONE ENCOUNTER
The patient is experiencing diarrhea after she donated a kidney.  I would like to request that she undergo some stool testing for C. difficile and enteric pathogen's.

## 2024-10-18 DIAGNOSIS — K21.9 GASTROESOPHAGEAL REFLUX DISEASE WITHOUT ESOPHAGITIS: ICD-10-CM

## 2024-10-18 RX ORDER — DICYCLOMINE HCL 20 MG
20 TABLET ORAL EVERY 6 HOURS
Qty: 360 TABLET | Refills: 1 | Status: SHIPPED | OUTPATIENT
Start: 2024-10-18

## 2024-10-24 DIAGNOSIS — K21.9 GASTROESOPHAGEAL REFLUX DISEASE WITHOUT ESOPHAGITIS: ICD-10-CM

## 2024-10-24 RX ORDER — ESOMEPRAZOLE MAGNESIUM 40 MG/1
CAPSULE, DELAYED RELEASE ORAL
Qty: 180 CAPSULE | Refills: 1 | Status: SHIPPED | OUTPATIENT
Start: 2024-10-24

## 2024-11-14 DIAGNOSIS — Z00.6 ENCOUNTER FOR EXAMINATION FOR NORMAL COMPARISON OR CONTROL IN CLINICAL RESEARCH PROGRAM: ICD-10-CM

## 2024-11-22 ENCOUNTER — APPOINTMENT (OUTPATIENT)
Dept: LAB | Facility: CLINIC | Age: 69
End: 2024-11-22
Payer: MEDICARE

## 2024-11-22 DIAGNOSIS — K91.2 POSTSURGICAL MALABSORPTION: ICD-10-CM

## 2024-11-22 DIAGNOSIS — R79.0 LOW FERRITIN: ICD-10-CM

## 2024-11-22 DIAGNOSIS — Z00.6 ENCOUNTER FOR EXAMINATION FOR NORMAL COMPARISON OR CONTROL IN CLINICAL RESEARCH PROGRAM: ICD-10-CM

## 2024-11-22 LAB — TSH SERPL DL<=0.05 MIU/L-ACNC: 2.59 UIU/ML (ref 0.45–4.5)

## 2024-11-22 PROCEDURE — 84443 ASSAY THYROID STIM HORMONE: CPT

## 2024-11-22 PROCEDURE — 36415 COLL VENOUS BLD VENIPUNCTURE: CPT

## 2024-11-26 ENCOUNTER — RESULTS FOLLOW-UP (OUTPATIENT)
Dept: BARIATRICS | Facility: CLINIC | Age: 69
End: 2024-11-26

## 2024-12-03 LAB
APOB+LDLR+PCSK9 GENE MUT ANL BLD/T: NOT DETECTED
BRCA1+BRCA2 DEL+DUP + FULL MUT ANL BLD/T: NOT DETECTED
MLH1+MSH2+MSH6+PMS2 GN DEL+DUP+FUL M: NOT DETECTED

## 2024-12-10 ENCOUNTER — OFFICE VISIT (OUTPATIENT)
Age: 69
End: 2024-12-10
Payer: MEDICARE

## 2024-12-10 VITALS
HEIGHT: 67 IN | DIASTOLIC BLOOD PRESSURE: 64 MMHG | TEMPERATURE: 96.8 F | BODY MASS INDEX: 35.69 KG/M2 | OXYGEN SATURATION: 97 % | SYSTOLIC BLOOD PRESSURE: 118 MMHG | WEIGHT: 227.4 LBS | HEART RATE: 84 BPM

## 2024-12-10 DIAGNOSIS — L82.1 SEBORRHEIC KERATOSIS: ICD-10-CM

## 2024-12-10 DIAGNOSIS — D18.01 CHERRY ANGIOMA: ICD-10-CM

## 2024-12-10 DIAGNOSIS — D22.9 MULTIPLE MELANOCYTIC NEVI: Primary | ICD-10-CM

## 2024-12-10 DIAGNOSIS — D48.5 NEOPLASM OF UNCERTAIN BEHAVIOR OF SKIN: ICD-10-CM

## 2024-12-10 DIAGNOSIS — L81.4 LENTIGINES: ICD-10-CM

## 2024-12-10 PROCEDURE — 99214 OFFICE O/P EST MOD 30 MIN: CPT | Performed by: STUDENT IN AN ORGANIZED HEALTH CARE EDUCATION/TRAINING PROGRAM

## 2024-12-10 PROCEDURE — 88342 IMHCHEM/IMCYTCHM 1ST ANTB: CPT | Performed by: STUDENT IN AN ORGANIZED HEALTH CARE EDUCATION/TRAINING PROGRAM

## 2024-12-10 PROCEDURE — 11102 TANGNTL BX SKIN SINGLE LES: CPT | Performed by: STUDENT IN AN ORGANIZED HEALTH CARE EDUCATION/TRAINING PROGRAM

## 2024-12-10 PROCEDURE — 88305 TISSUE EXAM BY PATHOLOGIST: CPT | Performed by: STUDENT IN AN ORGANIZED HEALTH CARE EDUCATION/TRAINING PROGRAM

## 2024-12-10 PROCEDURE — 88341 IMHCHEM/IMCYTCHM EA ADD ANTB: CPT | Performed by: STUDENT IN AN ORGANIZED HEALTH CARE EDUCATION/TRAINING PROGRAM

## 2024-12-10 NOTE — PROGRESS NOTES
"[facility] Note     Patient Name: Paulina Sidhu  Encounter Date: 12/10/24     Have you been cared for by a [facility] Dermatologist in the last 3 years and, if so, which description applies to you?    Yes.  I have been here within the last 3 years, and my medical history has NOT changed since that time.  I am FEMALE/of child-bearing potential.  Knee surgery and donated kidney.  REVIEW OF SYSTEMS:  Have you recently had or currently have any of the following? No changes in my recent health.   PAST MEDICAL HISTORY:  Have you personally ever had or currently have any of the following?  If \"YES,\" then please provide more detail. No changes in my medical history.   HISTORY OF IMMUNOSUPPRESSION: Do you have a history of any of the following:  Systemic Immunosuppression such as Diabetes, Biologic or Immunotherapy, Chemotherapy, Organ Transplantation, Bone Marrow Transplantation or Prednisone?  No     Answering \"YES\" requires the addition of the dotphrase \"IMMUNOSUPPRESSED\" as the first diagnosis of the patient's visit.   FAMILY HISTORY:  Any \"first degree relatives\" (parent, brother, sister, or child) with the following?    No changes in my family's known health.   PATIENT EXPERIENCE:    Do you want the Dermatologist to perform a COMPLETE skin exam today including a clinical examination under the \"bra and underwear\" areas?  Yes  If necessary, do we have your permission to call and leave a detailed message on your Preferred Phone number that includes your specific medical information?  Yes      Allergies   Allergen Reactions   • Shellfish-Derived Products - Food Allergy Anaphylaxis   • Clarithromycin Rash   • Medical Tape Itching and Other (See Comments)     Localized redness      Current Outpatient Medications:   •  Acetaminophen (Tylenol) 325 MG CAPS, Take 2 capsules by mouth every 8 (eight) hours as needed, Disp: , Rfl:   •  Ascorbic Acid (JC-C PO), , Disp: , Rfl:   •  Cholecalciferol (VITAMIN D) " 125 MCG (5000 UT) CAPS, , Disp: , Rfl:   •  Cyanocobalamin 1000 MCG/ML KIT, Inject 1 mL (1,000 mcg total) as directed once a week for 4 doses, Disp: 4 mL, Rfl: 0  •  diclofenac sodium (VOLTAREN) 1 %, Place 2 g on the skin if needed, Disp: , Rfl:   •  dicyclomine (BENTYL) 20 mg tablet, TAKE 1 TABLET BY MOUTH EVERY 6 HOURS., Disp: 360 tablet, Rfl: 1  •  esomeprazole (NexIUM) 40 MG capsule, TAKE 1 CAPSULE BY MOUTH 2 TIMES A DAY BEFORE MEALS., Disp: 180 capsule, Rfl: 1  •  famotidine (PEPCID) 40 MG tablet, TAKE 1 TABLET BY MOUTH EVERY DAY, Disp: 90 tablet, Rfl: 1  •  folic acid (FOLVITE) 1 mg tablet, folic acid 1 mg tablet, Disp: , Rfl:   •  hydrochlorothiazide (HYDRODIURIL) 25 mg tablet, Take 25 mg by mouth every morning, Disp: , Rfl:   •  Influenza Vac A&B SA Adj Quad (Fluad Quadrivalent) 0.5 ML PRSY, , Disp: , Rfl:   •  lamoTRIgine (LaMICtal) 100 mg tablet, Take 100 mg by mouth every morning, Disp: , Rfl:   •  losartan (COZAAR) 100 MG tablet, Take 100 mg by mouth every morning, Disp: , Rfl:   •  Magnesium 300 MG CAPS, Take 1 tablet by mouth daily, Disp: , Rfl:   •  mometasone (ELOCON) 0.1 % lotion, Apply 1 Application topically if needed, Disp: , Rfl:   •  Multiple Vitamin (Multivitamin Adult) TABS, Take 1 tablet by mouth daily in the early morning, Disp: , Rfl:   •  potassium chloride (K-DUR,KLOR-CON) 10 mEq tablet, Take 10 mEq by mouth every morning, Disp: , Rfl:   •  rOPINIRole (REQUIP) 0.25 mg tablet, Take 0.25 mg by mouth daily at bedtime, Disp: , Rfl:   •  tetanus-diphtheria-acellular pertussis (BOOSTRIX) injection, , Disp: , Rfl:   •  timolol (TIMOPTIC) 0.5 % ophthalmic solution, Administer 1 drop to both eyes 2 (two) times a day, Disp: , Rfl:   •  wheat dextrin (BENEFIBER), Take 1 packet by mouth daily, Disp: , Rfl:   •  Zoster Vac Recomb Adjuvanted 50 MCG/0.5ML SUSR, , Disp: , Rfl:   •  aspirin 81 MG tablet, Take 81 mg by mouth daily, Disp: , Rfl:   •  cetirizine (ZyrTEC) 10 mg tablet, Take 10 mg by mouth if  needed for allergies or rhinitis, Disp: , Rfl:   •  fluticasone (FLONASE) 50 mcg/act nasal spray, SPRAY 2 SPRAYS INTO EACH NOSTRIL EVERY DAY (Patient not taking: Reported on 12/10/2024), Disp: 48 mL, Rfl: 3  •  gabapentin (NEURONTIN) 600 MG tablet, Take 600 mg by mouth every morning, Disp: , Rfl:   •  polyethylene glycol (GOLYTELY) 4000 mL solution, Take 4,000 mL by mouth once for 1 dose, Disp: 4000 mL, Rfl: 0        Patient present for SOC left hand dorsal has grown and on neck right upper arm took ticks off.Dry skin.    Whom besides the patient is providing clinical information about today's encounter?   NO ADDITIONAL HISTORIAN (patient alone provided history)    Physical Exam and Assessment/Plan by Diagnosis:      SEBORRHEIC KERATOSES  - Relevant exam: Scattered over the trunk/extremities are waxy brown to black plaques and papules with stuck on appearance and consistent dermoscopy  - Exam and clinical history consistent with seborrheic keratoses  - Counseled that these are benign growths that do not require treatment    MELANOCYTIC NEVI  -Relevant exam: Scattered over the trunk/extremities are homogenously pigmented brown macules and papules. ELM performed and without concerning findings. No outliers unless otherwise noted in today's note  - Exam and clinical history consistent with melanocytic nevi  - Counseled to return to clinic prior to scheduled appointment should any of these lesions change or should any new lesions of concern arise  - Counseled on use of sun protection daily. Reviewed latest FDA sunscreen guidelines, including use of broad spectrum (UVA and UVB blocking) sunscreen or sun protective clothing with SPF 30-50 every 2-3 hours and reapplied after exposure to water    LENTIGINES  OTHER SKIN CHANGES DUE TO CHRONIC EXPOSURE TO NONIONIZING RADIATION  - Relevant exam: Over sun exposed areas are brown macules. ELM performed and without concerning findings.  - Exam and clinical history consistent with  lentigines.  - Counseled to return to clinic prior to scheduled appointment should any of these lesions change or should any new lesions of concern arise.  - Recommended use of sunscreen as above and below.    CHERRY ANGIOMAS  - Relevant exam: Scattered over the trunk/extremities are red papules  - Exam and clinical history consistent with cherry angiomas  - Educated that these are benign      NEOPLASM OF UNCERTAIN BEHAVIOR    Physical Exam:  (Anatomic Location); (Size and Morphological Description); (Differential Diagnosis):  Specimen A: 69 year old female; skin; shave; left dorsal hand; 0.5 cm light tan papule; diff DDX: seb k vs SCC    Pertinent Positives:  Pertinent Negatives:    Additional History of Present Condition:  Present upon skin exam, has been growing    Plan:  Shave biopsy performed today   Signed consent obtained   Will call patient with results       PROCEDURES PERFORMED TODAY ASSOCIATED WITH THIS CONDITION:          TANGENTIAL BIOPSY  PROCEDURE TANGENTIAL (SHAVE) BIOPSY NOTE:    Performing Physician:   Anatomic Location; Clinical Description with size (cm); Pre-Op Diagnosis:   Specimen A: 69 year old female; skin; shave; left dorsal hand; 0.5 cm light tan papule; diff DDX: seb k vs SCC  Post-op diagnosis: Same     Local anesthesia: 1% xylocaine with epi      Topical anesthesia: None    Hemostasis: Aluminum chloride       After obtaining informed consent  at which time there was a discussion about the purpose of biopsy  and low risks of infection and bleeding.  The area was prepped and draped in the usual fashion. Anesthesia was obtained with 1% lidocaine with epinephrine. A shave biopsy to an appropriate sampling depth was obtained by Shave (Dermablade or 15 blade) The resulting wound was covered with surgical ointment and bandaged appropriately.     The patient tolerated the procedure well without complications and was without signs of functional compromise.      Specimen has been sent for  "review by Dermatopathology.    Standard post-procedure care has been explained and has been included in written form within the patient's copy of Informed Consent.    INFORMED CONSENT DISCUSSION AND POST-OPERATIVE INSTRUCTIONS FOR PATIENT    I.  RATIONALE FOR PROCEDURE  I understand that a skin biopsy allows the Dermatologist to test a lesion or rash under the microscope to obtain a diagnosis.  It usually involves numbing the area with numbing medication and removing a small piece of skin; sometimes the area will be closed with sutures. In this specific procedure, sutures are not usually needed.  If any sutures are placed, then they are usually need to be removed in 2 weeks or less.    I understand that my Dermatologist recommends that a skin \"shave\" biopsy be performed today.  A local anesthetic, similar to the kind that a dentist uses when filling a cavity, will be injected with a very small needle into the skin area to be sampled.  The injected skin and tissue underneath \"will go to sleep” and become numb so no pain should be felt afterwards.  An instrument shaped like a tiny \"razor blade\" (shave biopsy instrument) will be used to cut a small piece of tissue and skin from the area so that a sample of tissue can be taken and examined more closely under the microscope.  A slight amount of bleeding will occur, but it will be stopped with direct pressure and a pressure bandage and any other appropriate methods.  I understands that a scar will form where the wound was created.  Surgical ointment will be applied to help protect the wound.  Sutures are not usually needed.    II.  RISKS AND POTENTIAL COMPLICATIONS   I understand the risks and potential complications of a skin biopsy include but are not limited to the following:  Bleeding  Infection  Pain  Scar/keloid  Skin discoloration  Incomplete Removal  Recurrence  Nerve Damage/Numbness/Loss of Function  Allergic Reaction to Anesthesia  Biopsies are diagnostic " "procedures and based on findings additional treatment or evaluation may be required  Loss or destruction of specimen resulting in no additional findings    My Dermatologist has explained to me the nature of the condition, the nature of the procedure, and the benefits to be reasonably expected compared with alternative approaches.  My Dermatologist has discussed the likelihood of major risks or complications of this procedure including the specific risks listed above, such as bleeding, infection, and scarring/keloid.  I understand that a scar is expected after this procedure.  I understand that my physician cannot predict if the scar will form a \"keloid,\" which extends beyond the borders of the wound that is created.  A keloid is a thick, painful, and bumpy scar.  A keloid can be difficult to treat, as it does not always respond well to therapy, which includes injecting cortisone directly into the keloid every few weeks.  While this usually reduces the pain and size of the scar, it does not eliminate it.      I understand that photographs may be taken before and after the procedure.  These will be maintained as part of the medical providers confidential records and may not be made available to me.  I further authorize the medical provider to use the photographs for teaching purposes or to illustrate scientific papers, books, or lectures if in his/her judgment, medical research, education, or science may benefit from its use.    I have had an opportunity to fully inquire about the risks and benefits of this procedure and its alternatives.   I have been given ample time and opportunity to ask questions and to seek a second opinion if I wished to do so.  I acknowledge that there have specifically been no guarantees as to the cosmetic results from the procedure.  I am aware that with any procedure there is always the possibility of an unexpected complication.    III. POST-PROCEDURAL CARE (WHAT YOU WILL NEED TO DO \"AFTER " "THE BIOPSY\" TO OPTIMIZE HEALING)    Keep the area clean and dry.  Try NOT to remove the bandage or get it wet for the first 24 hours.    Gently clean the area and apply surgical ointment (such as Vaseline petrolatum ointment, which is available \"over the counter\" and not a prescription) to the biopsy site for up to 2 weeks straight.  This acts to protect the wound from the outside world.      Sutures are not usually placed in this procedure.  If any sutures were placed, return for suture removal as instructed (generally 1 week for the face, 2 weeks for the body).      Take Acetaminophen (Tylenol) for discomfort, if no contraindications.  Ibuprofen or aspirin could make bleeding worse.    Call our office immediately for signs of infection: fever, chills, increased redness, warmth, tenderness, discomfort/pain, or pus or foul smell coming from the wound.    WHAT TO DO IF THERE IS ANY BLEEDING?  If a small amount of bleeding is noticed, place a clean cloth over the area and apply firm pressure for ten minutes.  Check the wound after 10 minutes of direct pressure.  If bleeding persists, try one more time for an additional 10 minutes of direct pressure on the area.  If the bleeding becomes heavier or does not stop after the second attempt, or if you have any other questions about this procedure, then please call your [facility]'s Dermatologist by calling [phone] (SKIN).     I hereby acknowledge that I have reviewed and verified the site with my Dermatologist and have requested and authorized my Dermatologist to proceed with the procedure.         Medical Complexity:    UNDIAGNOSED NEW PROBLEM WITH UNCERTAIN DIAGNOSIS.  A condition included in the differential diagnosis represents a high risk of morbidity without treatment.          Scribe Attestation    I,:  Julia Ryan am acting as a scribe while in the presence of the attending physician.:       I,:  Balaji Mayorga, DO personally performed the services " described in this documentation    as scribed in my presence.:

## 2024-12-12 PROCEDURE — 88305 TISSUE EXAM BY PATHOLOGIST: CPT | Performed by: STUDENT IN AN ORGANIZED HEALTH CARE EDUCATION/TRAINING PROGRAM

## 2024-12-12 PROCEDURE — 88342 IMHCHEM/IMCYTCHM 1ST ANTB: CPT | Performed by: STUDENT IN AN ORGANIZED HEALTH CARE EDUCATION/TRAINING PROGRAM

## 2024-12-12 PROCEDURE — 88341 IMHCHEM/IMCYTCHM EA ADD ANTB: CPT | Performed by: STUDENT IN AN ORGANIZED HEALTH CARE EDUCATION/TRAINING PROGRAM

## 2025-01-13 NOTE — ASSESSMENT & PLAN NOTE
-s/p open Nakita-En-Y Gastric Bypass - DS (multiple surgeries) with Dr. Cruz in [place] in 2000.  She is down 9lbs in the last year but still overall up from her ramírez and wishes to get below 200lbs. She will work on diet and exercise changes and f/u with RD, LCSW, and consult with [facility].     Will trial Wegovy - discussed insurance will likely not cover but will try. Patient denies personal history of pancreatitis. Patient also denies personal and family history of medullary thyroid cancer and multiple endocrine neoplasia type 2 (MEN 2 tumor).     Initial: 315lbs  Current: 241.1lbs  Ramírez: 170lbs  Current BMI is Body mass index is 36.34 kg/m².    Tolerating a regular diet-yes  Eating at least 60 grams of protein per day-no  Following 30/60 minute rule with liquids-no  Drinking at least 64 ounces of fluid per day-no  Drinking carbonated beverages-no  Sufficient exercise-no d/t back and knee pain  Using NSAIDs regularly-no  Using nicotine-no  Using alcohol-no. Advised about the risks of alcohol s/p bariatric surgery and recommend avoiding all alcohol

## 2025-01-13 NOTE — ASSESSMENT & PLAN NOTE
-Controlled On Nexium 40mg QD and Pepcid 40mg   -Advised avoidance of food triggers and gastric irritants, follow anti-reflux diet and lifestyle measures  -Weight loss and diet changes should help with reflux symptoms

## 2025-01-17 ENCOUNTER — OFFICE VISIT (OUTPATIENT)
Dept: BARIATRICS | Facility: CLINIC | Age: 70
End: 2025-01-17
Payer: MEDICARE

## 2025-01-17 VITALS
SYSTOLIC BLOOD PRESSURE: 124 MMHG | WEIGHT: 232 LBS | HEIGHT: 67 IN | BODY MASS INDEX: 36.41 KG/M2 | HEART RATE: 61 BPM | DIASTOLIC BLOOD PRESSURE: 80 MMHG

## 2025-01-17 DIAGNOSIS — Z48.815 ENCOUNTER FOR SURGICAL AFTERCARE FOLLOWING SURGERY OF DIGESTIVE SYSTEM: Primary | ICD-10-CM

## 2025-01-17 DIAGNOSIS — E66.812 OBESITY, CLASS II, BMI 35-39.9: ICD-10-CM

## 2025-01-17 DIAGNOSIS — K21.9 GERD (GASTROESOPHAGEAL REFLUX DISEASE): ICD-10-CM

## 2025-01-17 DIAGNOSIS — G47.33 OSA (OBSTRUCTIVE SLEEP APNEA): ICD-10-CM

## 2025-01-17 DIAGNOSIS — I10 ESSENTIAL HYPERTENSION: ICD-10-CM

## 2025-01-17 DIAGNOSIS — K91.2 POSTSURGICAL MALABSORPTION: ICD-10-CM

## 2025-01-17 PROCEDURE — 99214 OFFICE O/P EST MOD 30 MIN: CPT | Performed by: PHYSICIAN ASSISTANT

## 2025-01-17 NOTE — PROGRESS NOTES
Assessment/Plan:    Encounter for surgical aftercare following surgery of digestive system  -s/p open Nakita-En-Y Gastric Bypass - DS (multiple surgeries) with Dr. Cruz in [place] in 2000.  She is down 9lbs in the last year but still overall up from her ramírez and wishes to get below 200lbs. She will work on diet and exercise changes and f/u with RD, LCSW, and consult with MWM.     Will trial Wegovy - discussed insurance will likely not cover but will try. Patient denies personal history of pancreatitis. Patient also denies personal and family history of medullary thyroid cancer and multiple endocrine neoplasia type 2 (MEN 2 tumor).     Initial: 315lbs  Current: 241.1lbs  Ramírez: 170lbs  Current BMI is Body mass index is 36.34 kg/m².    Tolerating a regular diet-yes  Eating at least 60 grams of protein per day-no  Following 30/60 minute rule with liquids-no  Drinking at least 64 ounces of fluid per day-no  Drinking carbonated beverages-no  Sufficient exercise-no d/t back and knee pain  Using NSAIDs regularly-no  Using nicotine-no  Using alcohol-no. Advised about the risks of alcohol s/p bariatric surgery and recommend avoiding all alcohol      Essential hypertension  -on losartan and HCTZ  -Continue monitoring and management with prescribing provider      ANDRAE (obstructive sleep apnea)  -using CPAP; continue monitoring/management with sleep medicine    GERD (gastroesophageal reflux disease)  -Controlled On Nexium 40mg QD and Pepcid 40mg   -Advised avoidance of food triggers and gastric irritants, follow anti-reflux diet and lifestyle measures  -Weight loss and diet changes should help with reflux symptoms       Postsurgical malabsorption  -At risk for malabsorption of vitamins/minerals secondary to malabsorption and restriction of intake from bariatric surgery  -NOT Currently taking adequate postop bariatric surgery vitamin supplementation: 1,000mcg B12, Vitamin C, Vitamin D 5,000IU, folic acid, potassium - needs to start  "bariatric MVI and calcium citrate 500mg TID    -Obtain CBC/Metabolic panel  -Patient received education about the importance of adhering to a lifelong supplementation regimen to avoid vitamin/mineral deficiencies        Diagnoses and all orders for this visit:    Encounter for surgical aftercare following surgery of digestive system    Essential hypertension    ANDRAE (obstructive sleep apnea)    GERD (gastroesophageal reflux disease)    Postsurgical malabsorption  -     Semaglutide-Weight Management (WEGOVY) 0.25 MG/0.5ML; Inject 0.5 mL (0.25 mg total) under the skin once a week for 28 days  -     CBC and differential; Future  -     Comprehensive metabolic panel; Future  -     Folate; Future  -     Hemoglobin A1C; Future  -     Iron Panel (Includes Ferritin, Iron Sat%, Iron, and TIBC); Future  -     Vitamin A; Future  -     PTH, intact; Future  -     Lipid panel; Future  -     Vitamin B1, whole blood; Future  -     Vitamin D 25 hydroxy; Future  -     Vitamin B12; Future  -     Zinc; Future  -     Vitamin K; Future  -     Vitamin E; Future    Obesity, Class II, BMI 35-39.9  -     Hemoglobin A1C; Future  -     Lipid panel; Future          Subjective:      Patient ID: Paulina Sidhu is a 69 y.o. female.    -s/p open Nakita-En-Y Gastric Bypass - DS (multiple surgeries) with Dr. Cruz in [place] in 2000.  Presents to the office today for routine follow up. Tolerating diet without issues; denies N/V, dysphagia. No reflux if takes Nexium 40mg daily.    She is down 9lbs in the last year but still feels hunger/cravings and wishes to lose more weight.     Daily BM or every other day, hx of IBS.    She met with Dr. Cunha in February 2024. \"Declines GLP-1 for now, Benign thyroid nodule, Not a candidate for Phentermine/Qsymia/Contrave due to cardiac risk and bipolar ds.\"    Retired paraprofessional. Has 3 daughters. Daughter Dodie had CVA recently - wishes to pursue alternative medicine.   had kidney transplant in September - " "she donated a kidney for [facility] in August.    Diet Recall:   B - eggs and lobo and 1/2 bagel and juice and coffee  L - sandwich   D - chicken or fish or venison and veggies    Fluids - 3 cups coffee, 2 cans seltzer, 6oz juice, 1 cup iced tea    The following portions of the patient's history were reviewed and updated as appropriate: allergies, current medications, past family history, past medical history, past social history, past surgical history and problem list.    Review of Systems   Constitutional:  Negative for chills and fever. Unexpected weight change: planned weight loss.  HENT:  Negative for trouble swallowing.    Respiratory:  Negative for cough and shortness of breath.    Cardiovascular:  Negative for chest pain and palpitations.   Gastrointestinal:  Negative for abdominal pain, constipation, diarrhea, nausea and vomiting.   Musculoskeletal:  Positive for arthralgias and back pain.   Neurological:  Negative for dizziness.   Psychiatric/Behavioral:          Denies anxiety and depression         Objective:      /80 (BP Location: Right arm, Patient Position: Sitting, Cuff Size: Large)   Pulse 61   Ht 5' 7\" (1.702 m)   Wt 105 kg (232 lb)   BMI 36.34 kg/m²     Colonoscopy-Completed       Physical Exam  Vitals reviewed.   Constitutional:       General: She is not in acute distress.     Appearance: She is well-developed.   HENT:      Head: Normocephalic and atraumatic.   Eyes:      General: No scleral icterus.  Cardiovascular:      Rate and Rhythm: Normal rate and regular rhythm.   Pulmonary:      Effort: Pulmonary effort is normal. No respiratory distress.   Abdominal:      General: There is no distension.      Palpations: Abdomen is soft.   Skin:     General: Skin is warm and dry.   Neurological:      Mental Status: She is alert.   Psychiatric:         Mood and Affect: Mood normal.         Behavior: Behavior normal.           BARRIERS: none identified    GOALS:   Continued/Maintain healthy " weight loss with good nutrition intakes.  Adequate hydration with at least 64oz. fluid intake.  Normal vitamin and mineral levels.  Exercise as tolerated.    Follow-up in 1 year. We kindly ask that your arrive 15 minutes before your scheduled appointment time with your provider to allow our staff to room you, get your vital signs and update your chart.    Follow diet as discussed.      Get lab work done in the next 2 weeks.  You have been given a lab slip today.  Please call the office if you need a replacement.  It is recommended to check with your insurance BEFORE getting labs done to make sure they are covered by your policy.  Also, please check with your PCP and other providers before getting labs to avoid duplicate labs. Make sure to HOLD any multivitamins that may contain biotin and any biotin supplements FOR 5 DAYS before any labs since it can affect the results.    Follow vitamin and mineral recommendations as reviewed with you.    Call our office if you have any problems with abdominal pain especially associated with fever, chills, nausea, vomiting or any other concerns.    All  Post-bariatric surgery patients should be aware that very small quantities of any alcohol can cause impairment and it is very possible not to feel the effect. The effect can be in the system for several hours.  It is also a stomach irritant.     It is advised to AVOID alcohol, Nonsteroidal antiinflammatory drugs (NSAIDS) and nicotine of all forms . Any of these can cause stomach irritation/pain.   left

## 2025-02-12 ENCOUNTER — TELEPHONE (OUTPATIENT)
Dept: BARIATRICS | Facility: CLINIC | Age: 70
End: 2025-02-12

## 2025-02-20 LAB
25(OH)D3+25(OH)D2 SERPL-MCNC: 45 NG/ML (ref 30–100)
BASOPHILS # BLD AUTO: 0 THOU/CMM (ref 0–0.1)
BASOPHILS NFR BLD AUTO: 0 %
DIFFERENTIAL METHOD BLD: NORMAL
EOSINOPHIL # BLD AUTO: 0.2 THOU/CMM (ref 0–0.5)
EOSINOPHIL NFR BLD AUTO: 4 %
ERYTHROCYTE [DISTWIDTH] IN BLOOD BY AUTOMATED COUNT: 14.2 % (ref 12–16)
EST. AVERAGE GLUCOSE BLD GHB EST-MCNC: 103 MG/DL
FERRITIN SERPL-MCNC: 53 NG/ML (ref 11–306.8)
HBA1C MFR BLD: 5.2 %
HCT VFR BLD AUTO: 40.6 % (ref 35–43)
HGB BLD-MCNC: 13.9 G/DL (ref 11.5–14.5)
LYMPHOCYTES # BLD AUTO: 1.4 THOU/CMM (ref 1–3)
LYMPHOCYTES NFR BLD AUTO: 25 %
MCH RBC QN AUTO: 30.4 PG (ref 26–34)
MCHC RBC AUTO-ENTMCNC: 34.4 G/DL (ref 32–37)
MCV RBC AUTO: 89 FL (ref 80–100)
MONOCYTES # BLD AUTO: 0.3 THOU/CMM (ref 0.3–1)
MONOCYTES NFR BLD AUTO: 5 %
NEUTROPHILS # BLD AUTO: 3.6 THOU/CMM (ref 1.8–7.8)
NEUTROPHILS NFR BLD AUTO: 66 %
PLATELET # BLD AUTO: 262 THOU/CMM (ref 140–350)
PMV BLD REES-ECKER: 9.5 FL (ref 7.5–11.3)
PTH-INTACT SERPL-MCNC: 102.6 PG/ML (ref 12–88)
RBC # BLD AUTO: 4.58 MILL/CMM (ref 3.7–4.7)
WBC # BLD AUTO: 5.5 THOU/CMM (ref 4–10)

## 2025-02-21 ENCOUNTER — RESULTS FOLLOW-UP (OUTPATIENT)
Dept: BARIATRICS | Facility: CLINIC | Age: 70
End: 2025-02-21

## 2025-02-21 DIAGNOSIS — E53.8 VITAMIN B12 DEFICIENCY: Primary | ICD-10-CM

## 2025-02-21 DIAGNOSIS — K91.2 POSTSURGICAL MALABSORPTION: ICD-10-CM

## 2025-02-21 LAB
ALBUMIN SERPL-MCNC: 4.7 G/DL (ref 3.5–5.7)
ALP SERPL-CCNC: 91 U/L (ref 35–120)
ALT SERPL-CCNC: 13 U/L
ANION GAP SERPL CALCULATED.3IONS-SCNC: 9 MMOL/L (ref 3–11)
AST SERPL-CCNC: 12 U/L
BILIRUB SERPL-MCNC: 1.1 MG/DL (ref 0.2–1)
BUN SERPL-MCNC: 23 MG/DL (ref 7–25)
CALCIUM SERPL-MCNC: 10.2 MG/DL (ref 8.5–10.5)
CHLORIDE SERPL-SCNC: 100 MMOL/L (ref 100–109)
CHOLEST SERPL-MCNC: 241 MG/DL
CHOLEST/HDLC SERPL: 5.2 {RATIO}
CO2 SERPL-SCNC: 30 MMOL/L (ref 21–31)
CREAT SERPL-MCNC: 1.4 MG/DL (ref 0.4–1.1)
CYTOLOGY CMNT CVX/VAG CYTO-IMP: ABNORMAL
FOLATE SERPL-MCNC: >22.3 NG/ML
GFR/BSA.PRED SERPLBLD CYS-BASED-ARV: 40 ML/MIN/{1.73_M2}
GLUCOSE SERPL-MCNC: 94 MG/DL (ref 65–99)
HDLC SERPL-MCNC: 46 MG/DL (ref 23–92)
IRON SATN MFR SERPL: 17 % (ref 20–50)
IRON SERPL-MCNC: 77 UG/DL (ref 50–212)
LDLC SERPL CALC-MCNC: 124 MG/DL
NONHDLC SERPL-MCNC: 195 MG/DL
POTASSIUM SERPL-SCNC: 4.3 MMOL/L (ref 3.5–5.2)
PROT SERPL-MCNC: 6.9 G/DL (ref 6.3–8.3)
SODIUM SERPL-SCNC: 139 MMOL/L (ref 135–145)
TIBC SERPL-MCNC: 445 UG/DL (ref 260–430)
TRANSFERRIN SERPL-MCNC: 318 MG/DL (ref 203–362)
TRIGL SERPL-MCNC: 357 MG/DL
VIT B12 SERPL-MCNC: 188 PG/ML (ref 180–914)

## 2025-02-21 RX ORDER — CYANOCOBALAMIN 1000 UG/ML
1000 INJECTION, SOLUTION INTRAMUSCULAR; SUBCUTANEOUS WEEKLY
Status: SHIPPED | OUTPATIENT
Start: 2025-02-21 | End: 2025-03-21

## 2025-02-21 NOTE — RESULT ENCOUNTER NOTE
-PTH likely elevated from decreased kidney function - review with PCP/nephrologist    -Triglycerides very elevated - diet changes and she should discuss with PCP and RD    -Vitamin B12 is still low - Please take an additional 2,000mcg sublingual B12 daily x 3 months. This can be found inexpensively OTC. I also recommend 1,000mcg IM B12 shots in the office; once weekly x 4 doses.    -Ferritin iron stores are greatly improved and look good - continue current regimen

## 2025-02-22 LAB — ZINC SERPL-MCNC: 81.8 UG/DL

## 2025-02-23 LAB
A-TOCOPHEROL VIT E SERPL-MCNC: 15 MG/L
BETA+GAMMA TOCOPHEROL SERPL-MCNC: 0.9 MG/L
RETINYL PALMITATE SERPL-MCNC: 0.03 MG/L
T GONDII AB SER-IMP: NORMAL
VIT A FREE SERPL-MCNC: 0.97 MG/L
VIT B1 BLD-SCNC: 143 NMOL/L

## 2025-02-25 LAB — PHYTONADIONE SERPL-MCNC: 1.23 NMOL/L

## 2025-04-27 DIAGNOSIS — K21.9 GASTROESOPHAGEAL REFLUX DISEASE WITHOUT ESOPHAGITIS: ICD-10-CM

## 2025-04-29 RX ORDER — ESOMEPRAZOLE MAGNESIUM 40 MG/1
CAPSULE, DELAYED RELEASE ORAL
Qty: 180 CAPSULE | Refills: 0 | Status: SHIPPED | OUTPATIENT
Start: 2025-04-29

## 2025-05-12 ENCOUNTER — NURSE TRIAGE (OUTPATIENT)
Age: 70
End: 2025-05-12

## 2025-05-12 NOTE — TELEPHONE ENCOUNTER
"FOLLOW UP: Please advise- pt would like referral to Hepatology for fatty liver    REASON FOR CONVERSATION: Abdominal Pain    SYMPTOMS: RUQ abd pain x one yr, 6/10 when severe.  Back pain, but pt unsure if related to RUQ pain due to hx of back problems, Abd distention, alternating diarrhea and constipation.    OTHER: Pt states she fell a few days ago and hit her right side. States she didn't go to ED because they probably wouldn't do anything.      Pt is very concerned about new dx of fatty liver from PCP, concerned about it progressing to cirrhosis due to having a friend with this dx.  Also hx of kidney donation last August.     DISPOSITION: Discuss with Provider and Call Back Patient (overriding See Within 2 Weeks in Office)      Reason for Disposition   Abdominal pain is a chronic symptom (recurrent or ongoing AND lasting > 4 weeks)    Answer Assessment - Initial Assessment Questions  1. LOCATION: \"Where does it hurt?\"       RUQ abd   2. RADIATION: \"Does the pain shoot anywhere else?\" (e.g., chest, back)      Not sure, has back problems   3. ONSET: \"When did the pain begin?\" (e.g., minutes, hours or days ago)       1 yr ago  4. SUDDEN: \"Gradual or sudden onset?\"      Gradual   5. PATTERN \"Does the pain come and go, or is it constant?\"      Intermittent   6. SEVERITY: \"How bad is the pain?\"  (e.g., Scale 1-10; mild, moderate, or severe)      When severe 6   7. RECURRENT SYMPTOM: \"Have you ever had this type of stomach pain before?\" If Yes, ask: \"When was the last time?\" and \"What happened that time?\"       Yes   8. CAUSE: \"What do you think is causing the stomach pain?\"      Fatty liver   9. RELIEVING/AGGRAVATING FACTORS: \"What makes it better or worse?\" (e.g., antacids, bending or twisting motion, bowel movement)      Worse after activity, doesn't hurt with rest  10. OTHER SYMPTOMS: \"Do you have any other symptoms?\" (e.g., back pain, diarrhea, fever, urination pain, vomiting)        Abd distention, alternating " constipation and diarrhea    Protocols used: Abdominal Pain - Female-Adult-[place]

## 2025-05-13 NOTE — TELEPHONE ENCOUNTER
Spoke to pt. Reviewed providers message with patient. Pt was okay with seeing Dr. Beltran. Pt. already has an appointment scheduled with Dr. Beltran for 6/18/25.

## 2025-06-18 ENCOUNTER — OFFICE VISIT (OUTPATIENT)
Dept: GASTROENTEROLOGY | Facility: CLINIC | Age: 70
End: 2025-06-18
Payer: MEDICARE

## 2025-06-18 VITALS
SYSTOLIC BLOOD PRESSURE: 118 MMHG | HEART RATE: 78 BPM | HEIGHT: 67 IN | DIASTOLIC BLOOD PRESSURE: 76 MMHG | TEMPERATURE: 97.6 F | WEIGHT: 224 LBS | BODY MASS INDEX: 35.16 KG/M2 | OXYGEN SATURATION: 100 %

## 2025-06-18 DIAGNOSIS — K21.9 GASTROESOPHAGEAL REFLUX DISEASE WITHOUT ESOPHAGITIS: ICD-10-CM

## 2025-06-18 DIAGNOSIS — K76.0 FATTY LIVER: Primary | ICD-10-CM

## 2025-06-18 DIAGNOSIS — E66.01 OBESITY, MORBID (HCC): ICD-10-CM

## 2025-06-18 DIAGNOSIS — K58.1 IRRITABLE BOWEL SYNDROME WITH CONSTIPATION: ICD-10-CM

## 2025-06-18 PROCEDURE — 99214 OFFICE O/P EST MOD 30 MIN: CPT | Performed by: INTERNAL MEDICINE

## 2025-06-18 RX ORDER — DEXTROMETHORPHAN HYDROBROMIDE AND PROMETHAZINE HYDROCHLORIDE 15; 6.25 MG/5ML; MG/5ML
SYRUP ORAL
COMMUNITY

## 2025-06-18 RX ORDER — POTASSIUM CHLORIDE 750 MG/1
TABLET, EXTENDED RELEASE ORAL
COMMUNITY
Start: 2025-04-10

## 2025-06-18 RX ORDER — DICYCLOMINE HCL 20 MG
20 TABLET ORAL EVERY 6 HOURS
Qty: 90 TABLET | Refills: 6 | Status: SHIPPED | OUTPATIENT
Start: 2025-06-18

## 2025-06-18 RX ORDER — ATORVASTATIN CALCIUM 10 MG/1
10 TABLET, FILM COATED ORAL DAILY
COMMUNITY
Start: 2025-04-04

## 2025-06-18 RX ORDER — NORTRIPTYLINE HYDROCHLORIDE 50 MG/1
50 CAPSULE ORAL
COMMUNITY

## 2025-06-18 RX ORDER — EPINEPHRINE 0.3 MG/.3ML
INJECTION SUBCUTANEOUS
COMMUNITY
Start: 2025-04-30

## 2025-06-18 RX ORDER — FAMOTIDINE 40 MG/1
40 TABLET, FILM COATED ORAL DAILY
Qty: 31 TABLET | Refills: 11 | Status: SHIPPED | OUTPATIENT
Start: 2025-06-18

## 2025-06-19 NOTE — PROGRESS NOTES
Name: Paulina Sidhu      : 1955      MRN: [id number]  Encounter Provider: Delonte Beltran DO  Encounter Date: 2025   Encounter department: [facility] [place]    :  Assessment & Plan  Fatty liver    Orders:    US elastography; Future    dicyclomine (BENTYL) 20 mg tablet; Take 1 tablet (20 mg total) by mouth every 6 (six) hours    Obesity, morbid (HCC)    Gastroesophageal reflux disease without esophagitis    Orders:    famotidine (PEPCID) 40 MG tablet; Take 1 tablet (40 mg total) by mouth daily    Irritable bowel syndrome with constipation      Assessment & Plan  1. Fatty liver:  - Liver enzymes are within normal limits, suggesting no progressive fibrosis or cirrhosis.  - Order ultrasound with elastography to further evaluate liver condition.    2. Irritable bowel syndrome (IBS):  - Severe constipation exacerbated by calcium supplements for osteopenia.  - Advise regular use of MiraLAX, either daily or every other day, to manage constipation.  - Provide prescription for dicyclomine to help with abdominal cramps.    3. Osteopenia:  - DEXA scan results indicate osteopenia in the lumbar spine and left femoral neck.  - Continue taking calcium supplements every other day.  - Use MiraLAX to manage constipation.    4. Medication management:  - Renew prescriptions for esomeprazole 40 mg daily and famotidine.      History of Present Illness     History of Present Illness  The patient is a 69-year-old female who presents for evaluation of fatty liver, irritable bowel syndrome (IBS), osteopenia, and medication management.    She reports experiencing pain in the region of her liver, which she attributes to her known diagnosis of fatty liver. She expresses concern about the potential progression to cirrhosis, even though she does not consume alcohol. She has been advised against undergoing a CT scan due to her kidney condition. She is not experiencing any episodes of nausea or  vomiting.    She has a history of IBS and has been prescribed calcium supplements due to osteopenia. The calcium intake has resulted in severe constipation, with bowel movements occurring only once every 4 to 5 days, followed by cramping. She describes the cramping as excruciating, comparable to childbirth labor pains. She has been taking dicyclomine for the cramps, but it has not provided significant relief. She also takes MiraLAX, which she started yesterday after a 4-day period without a bowel movement. Previously, she would experience a few days of constipation followed by loose stools, but currently, she is not having diarrhea. She has discontinued her calcium supplement and is considering taking it every other day instead. She is also taking Benefiber and a probiotic daily. She has tried stool softeners, but they have not been effective.    She has undergone 2 back surgeries for stenosis in her neck and back. She also reports issues with her hip and shoulder, and she experiences constant pain. She has had a knee replacement.    She is requesting refills of her esomeprazole, famotidine, and dicyclomine prescriptions. She takes esomeprazole once daily, although it was prescribed twice daily, and she experiences symptoms of GERD, including heartburn and reflux. She takes famotidine at night and is unsure if she needs a refill.    She had a colonoscopy and upper endoscopy in 02/2024 and 03/2024. She had several polyps in the colon. Her upper endoscopy in 02/2024 showed everything looked normal with surgical changes. She has a hiatal hernia. Her gastric bypass was seen and looked healthy and normal.    PAST SURGICAL HISTORY:  - Gastric bypass  - Two back surgeries for stenosis  - Knee replacement    FAMILY HISTORY  - Mother: Diverticulitis leading to a colostomy  - Father: Congestive heart failure       Objective   /76 (BP Location: Right arm, Patient Position: Sitting, Cuff Size: Standard)   Pulse 78    "Temp 97.6 °F (36.4 °C) (Temporal)   Ht 5' 7\" (1.702 m)   Wt 102 kg (224 lb)   SpO2 100%   BMI 35.08 kg/m²     Physical Exam  Lungs: Clear to auscultation bilaterally  Abdomen: Mild tenderness to palpation in the lower abdomen  Physical Exam  Vitals reviewed.   Constitutional:       General: She is not in acute distress.     Appearance: Normal appearance. She is not ill-appearing.     Eyes:      General: No scleral icterus.     Extraocular Movements: Extraocular movements intact.      Pupils: Pupils are equal, round, and reactive to light.       Cardiovascular:      Rate and Rhythm: Normal rate and regular rhythm.      Pulses: Normal pulses.      Heart sounds: Normal heart sounds. No murmur heard.  Pulmonary:      Effort: Pulmonary effort is normal.      Breath sounds: Normal breath sounds. No wheezing, rhonchi or rales.   Abdominal:      Palpations: Abdomen is soft. There is no mass.      Tenderness: There is no abdominal tenderness. There is no guarding or rebound.     Musculoskeletal:      Right lower leg: No edema.      Left lower leg: No edema.     Skin:     General: Skin is warm and dry.      Coloration: Skin is not jaundiced.      Findings: No rash.     Neurological:      General: No focal deficit present.      Mental Status: She is alert and oriented to person, place, and time.     Psychiatric:         Mood and Affect: Mood normal.         Behavior: Behavior normal.         "

## 2025-06-27 ENCOUNTER — NURSE TRIAGE (OUTPATIENT)
Age: 70
End: 2025-06-27

## 2025-06-27 NOTE — TELEPHONE ENCOUNTER
Pt. Calling asking if it's safe for her to use magnesium citrate due to kidney disease and having 1 kidney with a low GFR, please advise

## 2025-06-27 NOTE — TELEPHONE ENCOUNTER
REASON FOR CONVERSATION: Constipation    SYMPTOMS: Pt c/o constipation no bm for about 2 weeks, passing little gas    OTHER HEALTH INFORMATION: took miralax once daily since ov(6/18/25)dulcolax three times.    PROTOCOL DISPOSITION: No disposition on file.    CARE ADVICE PROVIDED: Advised mag citrate, 1/2 bottle followed by water if no bm within a few hours drink other half. If bm today start miralax BID tomorrow and titrate as needed.   ER PRECAUTIONS REVIEWED IF STOP PASSING GAS GO TO ED TO BE EVALUATED, PT AGREEABLE    PRACTICE FOLLOW-UP: N/A                      Answer Assessment - Initial Assessment Questions  1. When was your last bowel movement? What is the consistency, volume and how frequently are you going or when was the last time you went?  Last bm 2 weeks daily  2. Are your bowel movements associated with meals?   N/a  3. Do you have abdominal pain with bowel movements, without?   Took Bentyl with relief  4. If pain is present, when did it start, did the pain recently worsen or stay the same? Does anything make it feel better?   N/a    Protocols used: GI-IBS (irritable bowel syndrome)-ADULT-[place]

## 2025-06-30 NOTE — TELEPHONE ENCOUNTER
Pt. Took 1/2 Miralax prep on Friday and had diarrhea that started Saturday, pt. Doesn't feel she has fully emptied out but is passing brown pieces of stool in a large volume, advised her to start metamucil daily and she can resume taking Miralax 1-2 capfuls daily if she still feels constipated, reviewed difference between diarrhea and bowel leakage from constipation, pt. Will try metamucil, no further review needed

## 2025-07-07 ENCOUNTER — HOSPITAL ENCOUNTER (OUTPATIENT)
Dept: ULTRASOUND IMAGING | Facility: HOSPITAL | Age: 70
Discharge: HOME | End: 2025-07-07
Attending: INTERNAL MEDICINE
Payer: MEDICARE

## 2025-07-07 DIAGNOSIS — K76.0 FATTY LIVER: ICD-10-CM

## 2025-07-07 PROCEDURE — 76981 USE PARENCHYMA: CPT

## 2025-07-16 DIAGNOSIS — K21.9 GASTROESOPHAGEAL REFLUX DISEASE WITHOUT ESOPHAGITIS: ICD-10-CM

## 2025-07-16 RX ORDER — FAMOTIDINE 40 MG/1
40 TABLET, FILM COATED ORAL DAILY
Qty: 93 TABLET | Refills: 1 | Status: SHIPPED | OUTPATIENT
Start: 2025-07-16

## 2025-07-25 DIAGNOSIS — K21.9 GASTROESOPHAGEAL REFLUX DISEASE WITHOUT ESOPHAGITIS: ICD-10-CM

## 2025-07-28 RX ORDER — ESOMEPRAZOLE MAGNESIUM 40 MG/1
CAPSULE, DELAYED RELEASE ORAL
Qty: 180 CAPSULE | Refills: 1 | Status: SHIPPED | OUTPATIENT
Start: 2025-07-28